# Patient Record
Sex: FEMALE | Race: BLACK OR AFRICAN AMERICAN | Employment: OTHER | URBAN - METROPOLITAN AREA
[De-identification: names, ages, dates, MRNs, and addresses within clinical notes are randomized per-mention and may not be internally consistent; named-entity substitution may affect disease eponyms.]

---

## 2017-04-11 ENCOUNTER — HOSPITAL ENCOUNTER (OUTPATIENT)
Dept: GENERAL RADIOLOGY | Age: 47
Discharge: HOME | End: 2017-04-11
Payer: MEDICAID

## 2017-04-11 ENCOUNTER — HOSPITAL ENCOUNTER (OUTPATIENT)
Dept: LAB | Age: 47
Discharge: HOME | End: 2017-04-11

## 2017-04-11 DIAGNOSIS — M13.0 UNSPECIFIED POLYARTHROPATHY OR POLYARTHRITIS, SITE UNSPECIFIED: ICD-10-CM

## 2017-04-11 DIAGNOSIS — M93.001: ICD-10-CM

## 2017-04-11 PROCEDURE — 99001 SPECIMEN HANDLING PT-LAB: CPT | Performed by: SPECIALIST

## 2017-04-11 PROCEDURE — 73562 X-RAY EXAM OF KNEE 3: CPT

## 2017-05-02 ENCOUNTER — APPOINTMENT (OUTPATIENT)
Dept: CT IMAGING | Age: 47
DRG: 720 | End: 2017-05-02
Attending: EMERGENCY MEDICINE
Payer: MEDICAID

## 2017-05-02 ENCOUNTER — HOSPITAL ENCOUNTER (INPATIENT)
Age: 47
LOS: 7 days | Discharge: HOME HEALTH | DRG: 720 | End: 2017-05-09
Attending: EMERGENCY MEDICINE | Admitting: FAMILY MEDICINE
Payer: MEDICAID

## 2017-05-02 ENCOUNTER — APPOINTMENT (OUTPATIENT)
Dept: GENERAL RADIOLOGY | Age: 47
DRG: 720 | End: 2017-05-02
Attending: EMERGENCY MEDICINE
Payer: MEDICAID

## 2017-05-02 DIAGNOSIS — G93.40 ACUTE ENCEPHALOPATHY: ICD-10-CM

## 2017-05-02 DIAGNOSIS — N39.0 URINARY TRACT INFECTION WITHOUT HEMATURIA, SITE UNSPECIFIED: ICD-10-CM

## 2017-05-02 DIAGNOSIS — N18.6 ESRD ON HEMODIALYSIS (HCC): ICD-10-CM

## 2017-05-02 DIAGNOSIS — Z99.2 ESRD ON HEMODIALYSIS (HCC): ICD-10-CM

## 2017-05-02 DIAGNOSIS — R73.9 HYPERGLYCEMIA: ICD-10-CM

## 2017-05-02 DIAGNOSIS — A41.9 SEPSIS, DUE TO UNSPECIFIED ORGANISM: Primary | ICD-10-CM

## 2017-05-02 PROBLEM — R50.9 ACUTE FEBRILE ILLNESS: Status: ACTIVE | Noted: 2017-05-02

## 2017-05-02 PROBLEM — M32.9 LUPUS (SYSTEMIC LUPUS ERYTHEMATOSUS) (HCC): Status: ACTIVE | Noted: 2017-05-02

## 2017-05-02 LAB
ADMINISTERED INITIALS, ADMINIT: NORMAL
ALBUMIN SERPL BCP-MCNC: 3.2 G/DL (ref 3.4–5)
ALBUMIN/GLOB SERPL: 0.7 {RATIO} (ref 0.8–1.7)
ALP SERPL-CCNC: 151 U/L (ref 45–117)
ALT SERPL-CCNC: 20 U/L (ref 13–56)
ANION GAP BLD CALC-SCNC: 18 MMOL/L (ref 3–18)
APPEARANCE UR: ABNORMAL
APTT PPP: 30.5 SEC (ref 23–36.4)
ARTERIAL PATENCY WRIST A: YES
AST SERPL W P-5'-P-CCNC: 6 U/L (ref 15–37)
ATRIAL RATE: 116 BPM
BACTERIA URNS QL MICRO: ABNORMAL /HPF
BASE DEFICIT BLD-SCNC: 6 MMOL/L
BASOPHILS # BLD AUTO: 0 K/UL (ref 0–0.1)
BASOPHILS # BLD: 0 % (ref 0–2)
BDY SITE: ABNORMAL
BILIRUB SERPL-MCNC: 0.6 MG/DL (ref 0.2–1)
BILIRUB UR QL: NEGATIVE
BUN SERPL-MCNC: 97 MG/DL (ref 7–18)
BUN/CREAT SERPL: 6 (ref 12–20)
CALCIUM SERPL-MCNC: 9.2 MG/DL (ref 8.5–10.1)
CALCULATED P AXIS, ECG09: 48 DEGREES
CALCULATED R AXIS, ECG10: -24 DEGREES
CALCULATED T AXIS, ECG11: 115 DEGREES
CHLORIDE SERPL-SCNC: 92 MMOL/L (ref 100–108)
CK MB CFR SERPL CALC: ABNORMAL % (ref 0–4)
CK MB SERPL-MCNC: <1 NG/ML (ref 5–25)
CK SERPL-CCNC: 20 U/L (ref 26–192)
CO2 SERPL-SCNC: 20 MMOL/L (ref 21–32)
COLOR UR: YELLOW
CREAT SERPL-MCNC: 15.3 MG/DL (ref 0.6–1.3)
D50 ADMINISTERED, D50ADM: 0 ML
D50 ORDER, D50ORD: 0 ML
DIAGNOSIS, 93000: NORMAL
DIFFERENTIAL METHOD BLD: ABNORMAL
EOSINOPHIL # BLD: 0.2 K/UL (ref 0–0.4)
EOSINOPHIL NFR BLD: 1 % (ref 0–5)
EPITH CASTS URNS QL MICRO: ABNORMAL /LPF (ref 0–5)
ERYTHROCYTE [DISTWIDTH] IN BLOOD BY AUTOMATED COUNT: 13.1 % (ref 11.6–14.5)
EST. AVERAGE GLUCOSE BLD GHB EST-MCNC: 318 MG/DL
FLUAV AG NPH QL IA: NEGATIVE
FLUBV AG NOSE QL IA: NEGATIVE
GAS FLOW.O2 O2 DELIVERY SYS: ABNORMAL L/MIN
GAS FLOW.O2 SETTING OXYMISER: 3 L/M
GLOBULIN SER CALC-MCNC: 4.4 G/DL (ref 2–4)
GLUCOSE BLD STRIP.AUTO-MCNC: 139 MG/DL (ref 70–110)
GLUCOSE BLD STRIP.AUTO-MCNC: 140 MG/DL (ref 70–110)
GLUCOSE BLD STRIP.AUTO-MCNC: 203 MG/DL (ref 70–110)
GLUCOSE BLD STRIP.AUTO-MCNC: 205 MG/DL (ref 70–110)
GLUCOSE BLD STRIP.AUTO-MCNC: 269 MG/DL (ref 70–110)
GLUCOSE BLD STRIP.AUTO-MCNC: >600 MG/DL (ref 70–110)
GLUCOSE SERPL-MCNC: 627 MG/DL (ref 74–99)
GLUCOSE UR STRIP.AUTO-MCNC: >1000 MG/DL
GLUCOSE, GLC: 139 MG/DL
GLUCOSE, GLC: 140 MG/DL
GLUCOSE, GLC: 203 MG/DL
GLUCOSE, GLC: 205 MG/DL
GLUCOSE, GLC: 269 MG/DL
GLUCOSE, GLC: 627 MG/DL
HBA1C MFR BLD: 12.7 % (ref 4.2–5.6)
HBV SURFACE AB SER QL IA: POSITIVE
HBV SURFACE AB SERPL IA-ACNC: 54.63 MIU/ML
HBV SURFACE AG SER QL: <0.1 INDEX
HBV SURFACE AG SER QL: NEGATIVE
HCG SERPL QL: NEGATIVE
HCO3 BLD-SCNC: 19 MMOL/L (ref 22–26)
HCT VFR BLD AUTO: 28.2 % (ref 35–45)
HEP BS AB COMMENT,HBSAC: NORMAL
HGB BLD-MCNC: 9.4 G/DL (ref 12–16)
HGB UR QL STRIP: ABNORMAL
HIGH TARGET, HITG: 180 MG/DL
INR PPP: 1.1 (ref 0.8–1.2)
INSULIN ADMINSTERED, INSADM: 0 UNITS/HOUR
INSULIN ADMINSTERED, INSADM: 1.5 UNITS/HOUR
INSULIN ADMINSTERED, INSADM: 1.6 UNITS/HOUR
INSULIN ADMINSTERED, INSADM: 11.3 UNITS/HOUR
INSULIN ADMINSTERED, INSADM: 2.1 UNITS/HOUR
INSULIN ADMINSTERED, INSADM: 2.9 UNITS/HOUR
INSULIN ORDER, INSORD: 0 UNITS/HOUR
INSULIN ORDER, INSORD: 1.5 UNITS/HOUR
INSULIN ORDER, INSORD: 1.6 UNITS/HOUR
INSULIN ORDER, INSORD: 11.3 UNITS/HOUR
INSULIN ORDER, INSORD: 2.1 UNITS/HOUR
INSULIN ORDER, INSORD: 2.9 UNITS/HOUR
KETONES UR QL STRIP.AUTO: NEGATIVE MG/DL
LACTATE BLD-SCNC: 1.4 MMOL/L (ref 0.4–2)
LEUKOCYTE ESTERASE UR QL STRIP.AUTO: ABNORMAL
LOW TARGET, LOT: 140 MG/DL
LYMPHOCYTES # BLD AUTO: 3 % (ref 21–52)
LYMPHOCYTES # BLD: 0.3 K/UL (ref 0.9–3.6)
MAGNESIUM SERPL-MCNC: 2 MG/DL (ref 1.6–2.6)
MCH RBC QN AUTO: 30.7 PG (ref 24–34)
MCHC RBC AUTO-ENTMCNC: 33.3 G/DL (ref 31–37)
MCV RBC AUTO: 92.2 FL (ref 74–97)
MINUTES UNTIL NEXT BG, NBG: 60 MIN
MONOCYTES # BLD: 0.2 K/UL (ref 0.05–1.2)
MONOCYTES NFR BLD AUTO: 2 % (ref 3–10)
MULTIPLIER, MUL: 0
MULTIPLIER, MUL: 0.01
MULTIPLIER, MUL: 0.01
MULTIPLIER, MUL: 0.02
NEUTS SEG # BLD: 11.9 K/UL (ref 1.8–8)
NEUTS SEG NFR BLD AUTO: 94 % (ref 40–73)
NITRITE UR QL STRIP.AUTO: NEGATIVE
ORDER INITIALS, ORDINIT: NORMAL
P-R INTERVAL, ECG05: 128 MS
PCO2 BLD: 34.8 MMHG (ref 35–45)
PH BLD: 7.35 [PH] (ref 7.35–7.45)
PH UR STRIP: 6 [PH] (ref 5–8)
PLATELET # BLD AUTO: 105 K/UL (ref 135–420)
PMV BLD AUTO: 11.4 FL (ref 9.2–11.8)
PO2 BLD: 95 MMHG (ref 80–100)
POTASSIUM SERPL-SCNC: 4.2 MMOL/L (ref 3.5–5.5)
PROT SERPL-MCNC: 7.6 G/DL (ref 6.4–8.2)
PROT UR STRIP-MCNC: 100 MG/DL
PROTHROMBIN TIME: 13.6 SEC (ref 11.5–15.2)
Q-T INTERVAL, ECG07: 290 MS
QRS DURATION, ECG06: 84 MS
QTC CALCULATION (BEZET), ECG08: 403 MS
RBC # BLD AUTO: 3.06 M/UL (ref 4.2–5.3)
RBC #/AREA URNS HPF: ABNORMAL /HPF (ref 0–5)
SAO2 % BLD: 97 % (ref 92–97)
SERVICE CMNT-IMP: ABNORMAL
SODIUM SERPL-SCNC: 130 MMOL/L (ref 136–145)
SP GR UR REFRACTOMETRY: 1.01 (ref 1–1.03)
SPECIMEN TYPE: ABNORMAL
TOTAL RESP. RATE, ITRR: 38
TROPONIN I SERPL-MCNC: <0.02 NG/ML (ref 0–0.04)
TSH SERPL DL<=0.05 MIU/L-ACNC: 0.68 UIU/ML (ref 0.36–3.74)
UROBILINOGEN UR QL STRIP.AUTO: 0.2 EU/DL (ref 0.2–1)
VENTRICULAR RATE, ECG03: 116 BPM
WBC # BLD AUTO: 12.6 K/UL (ref 4.6–13.2)
WBC URNS QL MICRO: ABNORMAL /HPF (ref 0–4)
YEAST URNS QL MICRO: ABNORMAL

## 2017-05-02 PROCEDURE — 85025 COMPLETE CBC W/AUTO DIFF WBC: CPT | Performed by: EMERGENCY MEDICINE

## 2017-05-02 PROCEDURE — 93005 ELECTROCARDIOGRAM TRACING: CPT

## 2017-05-02 PROCEDURE — 74011250636 HC RX REV CODE- 250/636: Performed by: INTERNAL MEDICINE

## 2017-05-02 PROCEDURE — 81001 URINALYSIS AUTO W/SCOPE: CPT | Performed by: EMERGENCY MEDICINE

## 2017-05-02 PROCEDURE — 74011000258 HC RX REV CODE- 258: Performed by: EMERGENCY MEDICINE

## 2017-05-02 PROCEDURE — 93990 DOPPLER FLOW TESTING: CPT

## 2017-05-02 PROCEDURE — 82550 ASSAY OF CK (CPK): CPT | Performed by: EMERGENCY MEDICINE

## 2017-05-02 PROCEDURE — 90935 HEMODIALYSIS ONE EVALUATION: CPT

## 2017-05-02 PROCEDURE — 74011250637 HC RX REV CODE- 250/637: Performed by: INTERNAL MEDICINE

## 2017-05-02 PROCEDURE — 5A1D60Z PERFORMANCE OF URINARY FILTRATION, MULTIPLE: ICD-10-PCS | Performed by: FAMILY MEDICINE

## 2017-05-02 PROCEDURE — 87186 SC STD MICRODIL/AGAR DIL: CPT | Performed by: EMERGENCY MEDICINE

## 2017-05-02 PROCEDURE — 83036 HEMOGLOBIN GLYCOSYLATED A1C: CPT | Performed by: EMERGENCY MEDICINE

## 2017-05-02 PROCEDURE — 83605 ASSAY OF LACTIC ACID: CPT

## 2017-05-02 PROCEDURE — 99285 EMERGENCY DEPT VISIT HI MDM: CPT

## 2017-05-02 PROCEDURE — 96365 THER/PROPH/DIAG IV INF INIT: CPT

## 2017-05-02 PROCEDURE — 83735 ASSAY OF MAGNESIUM: CPT | Performed by: EMERGENCY MEDICINE

## 2017-05-02 PROCEDURE — 96375 TX/PRO/DX INJ NEW DRUG ADDON: CPT

## 2017-05-02 PROCEDURE — 71010 XR CHEST PORT: CPT

## 2017-05-02 PROCEDURE — 85730 THROMBOPLASTIN TIME PARTIAL: CPT | Performed by: EMERGENCY MEDICINE

## 2017-05-02 PROCEDURE — 82962 GLUCOSE BLOOD TEST: CPT

## 2017-05-02 PROCEDURE — 85610 PROTHROMBIN TIME: CPT | Performed by: EMERGENCY MEDICINE

## 2017-05-02 PROCEDURE — 70450 CT HEAD/BRAIN W/O DYE: CPT

## 2017-05-02 PROCEDURE — 86038 ANTINUCLEAR ANTIBODIES: CPT | Performed by: INTERNAL MEDICINE

## 2017-05-02 PROCEDURE — 87040 BLOOD CULTURE FOR BACTERIA: CPT | Performed by: EMERGENCY MEDICINE

## 2017-05-02 PROCEDURE — 87086 URINE CULTURE/COLONY COUNT: CPT | Performed by: EMERGENCY MEDICINE

## 2017-05-02 PROCEDURE — 84703 CHORIONIC GONADOTROPIN ASSAY: CPT | Performed by: EMERGENCY MEDICINE

## 2017-05-02 PROCEDURE — 87077 CULTURE AEROBIC IDENTIFY: CPT | Performed by: EMERGENCY MEDICINE

## 2017-05-02 PROCEDURE — 80053 COMPREHEN METABOLIC PANEL: CPT | Performed by: EMERGENCY MEDICINE

## 2017-05-02 PROCEDURE — 87340 HEPATITIS B SURFACE AG IA: CPT | Performed by: INTERNAL MEDICINE

## 2017-05-02 PROCEDURE — 74011250636 HC RX REV CODE- 250/636: Performed by: EMERGENCY MEDICINE

## 2017-05-02 PROCEDURE — 82803 BLOOD GASES ANY COMBINATION: CPT

## 2017-05-02 PROCEDURE — 65660000004 HC RM CVT STEPDOWN

## 2017-05-02 PROCEDURE — 36600 WITHDRAWAL OF ARTERIAL BLOOD: CPT

## 2017-05-02 PROCEDURE — 86225 DNA ANTIBODY NATIVE: CPT | Performed by: INTERNAL MEDICINE

## 2017-05-02 PROCEDURE — 86706 HEP B SURFACE ANTIBODY: CPT | Performed by: INTERNAL MEDICINE

## 2017-05-02 PROCEDURE — 74011636637 HC RX REV CODE- 636/637: Performed by: EMERGENCY MEDICINE

## 2017-05-02 PROCEDURE — 86160 COMPLEMENT ANTIGEN: CPT | Performed by: INTERNAL MEDICINE

## 2017-05-02 PROCEDURE — 84443 ASSAY THYROID STIM HORMONE: CPT | Performed by: EMERGENCY MEDICINE

## 2017-05-02 PROCEDURE — 87804 INFLUENZA ASSAY W/OPTIC: CPT | Performed by: EMERGENCY MEDICINE

## 2017-05-02 RX ORDER — BISACODYL 5 MG
10 TABLET, DELAYED RELEASE (ENTERIC COATED) ORAL DAILY PRN
Status: DISCONTINUED | OUTPATIENT
Start: 2017-05-02 | End: 2017-05-09 | Stop reason: HOSPADM

## 2017-05-02 RX ORDER — MAGNESIUM SULFATE 100 %
4 CRYSTALS MISCELLANEOUS AS NEEDED
Status: DISCONTINUED | OUTPATIENT
Start: 2017-05-02 | End: 2017-05-09 | Stop reason: HOSPADM

## 2017-05-02 RX ORDER — DOXERCALCIFEROL 4 UG/2ML
1 INJECTION INTRAVENOUS
Status: DISCONTINUED | OUTPATIENT
Start: 2017-05-02 | End: 2017-05-09 | Stop reason: HOSPADM

## 2017-05-02 RX ORDER — SEVELAMER CARBONATE 800 MG/1
1600 TABLET, FILM COATED ORAL
Status: DISCONTINUED | OUTPATIENT
Start: 2017-05-02 | End: 2017-05-09 | Stop reason: HOSPADM

## 2017-05-02 RX ORDER — SODIUM CHLORIDE 0.9 % (FLUSH) 0.9 %
5-10 SYRINGE (ML) INJECTION AS NEEDED
Status: DISCONTINUED | OUTPATIENT
Start: 2017-05-02 | End: 2017-05-09 | Stop reason: HOSPADM

## 2017-05-02 RX ORDER — CINACALCET 30 MG/1
30 TABLET, FILM COATED ORAL
Status: DISCONTINUED | OUTPATIENT
Start: 2017-05-03 | End: 2017-05-09 | Stop reason: HOSPADM

## 2017-05-02 RX ORDER — DEXTROSE 50 % IN WATER (D50W) INTRAVENOUS SYRINGE
25-50 AS NEEDED
Status: DISCONTINUED | OUTPATIENT
Start: 2017-05-02 | End: 2017-05-09 | Stop reason: HOSPADM

## 2017-05-02 RX ORDER — ACETAMINOPHEN 325 MG/1
650 TABLET ORAL
Status: DISCONTINUED | OUTPATIENT
Start: 2017-05-02 | End: 2017-05-09 | Stop reason: HOSPADM

## 2017-05-02 RX ORDER — DIPHENHYDRAMINE HCL 25 MG
25 CAPSULE ORAL
Status: DISCONTINUED | OUTPATIENT
Start: 2017-05-02 | End: 2017-05-09 | Stop reason: HOSPADM

## 2017-05-02 RX ORDER — SIMVASTATIN 10 MG/1
10 TABLET, FILM COATED ORAL DAILY
Status: DISCONTINUED | OUTPATIENT
Start: 2017-05-03 | End: 2017-05-03 | Stop reason: SDUPTHER

## 2017-05-02 RX ORDER — HEPARIN SODIUM 5000 [USP'U]/ML
5000 INJECTION, SOLUTION INTRAVENOUS; SUBCUTANEOUS EVERY 12 HOURS
Status: DISCONTINUED | OUTPATIENT
Start: 2017-05-02 | End: 2017-05-09 | Stop reason: HOSPADM

## 2017-05-02 RX ORDER — MIDODRINE HYDROCHLORIDE 2.5 MG/1
5 TABLET ORAL 2 TIMES DAILY WITH MEALS
Status: DISCONTINUED | OUTPATIENT
Start: 2017-05-02 | End: 2017-05-03

## 2017-05-02 RX ORDER — ONDANSETRON 2 MG/ML
4 INJECTION INTRAMUSCULAR; INTRAVENOUS
Status: DISCONTINUED | OUTPATIENT
Start: 2017-05-02 | End: 2017-05-09 | Stop reason: HOSPADM

## 2017-05-02 RX ADMIN — SODIUM CHLORIDE 11.3 UNITS/HR: 900 INJECTION, SOLUTION INTRAVENOUS at 14:27

## 2017-05-02 RX ADMIN — PIPERACILLIN AND TAZOBACTAM 3.38 G: 3; .375 INJECTION, POWDER, LYOPHILIZED, FOR SOLUTION INTRAVENOUS; PARENTERAL at 21:45

## 2017-05-02 RX ADMIN — SODIUM CHLORIDE 1000 MG: 900 INJECTION, SOLUTION INTRAVENOUS at 14:31

## 2017-05-02 RX ADMIN — DOXERCALCIFEROL 1 MCG: 4 INJECTION, SOLUTION INTRAVENOUS at 17:30

## 2017-05-02 RX ADMIN — ERYTHROPOIETIN 5000 UNITS: 3000 INJECTION, SOLUTION INTRAVENOUS; SUBCUTANEOUS at 14:30

## 2017-05-02 RX ADMIN — SEVELAMER CARBONATE 1600 MG: 800 TABLET, FILM COATED ORAL at 21:41

## 2017-05-02 RX ADMIN — PIPERACILLIN SODIUM AND TAZOBACTAM SODIUM 4.5 G: 4; .5 INJECTION, POWDER, LYOPHILIZED, FOR SOLUTION INTRAVENOUS at 12:09

## 2017-05-02 RX ADMIN — SODIUM CHLORIDE 500 ML: 900 INJECTION, SOLUTION INTRAVENOUS at 12:09

## 2017-05-02 RX ADMIN — MIDODRINE HYDROCHLORIDE 5 MG: 2.5 TABLET ORAL at 21:41

## 2017-05-02 NOTE — ED TRIAGE NOTES
Pt missed HD recently. Arriving via EMS who reports patient has fever and is tachycardic. Pt has confusion.

## 2017-05-02 NOTE — IP AVS SNAPSHOT
Miesha  
 
 
 [address] Patient: Dawit Stahl MRN: [id number] [facility]:7/00/[birth year]98 You are allergic to the following Allergen Reactions Sulfa (Sulfonamide Antibiotics) Hives Recent Documentation Height Weight Breastfeeding? BMI OB Status Smoking Status 1.753 m 66 kg No 21.49 kg/m2 Postmenopausal Never Smoker Unresulted Labs Order Current Status CULTURE, BLOOD Preliminary result CULTURE, BLOOD Preliminary result CULTURE, BLOOD Preliminary result Emergency Contacts Name Discharge Info Relation Home Work Mobile Phoebe Vinson DISCHARGE CAREGIVER [3] Parent [1] [phone] About your hospitalization You were admitted on:  May 2, 2017 You last received care in the:  [address] [place] [address] You were discharged on:  May 9, 2017 Unit phone number:  [phone] Why you were hospitalized Your primary diagnosis was:  Acute Diastolic Chf (Congestive Heart Failure) (Hcc) Your diagnoses also included:  Uti (Urinary Tract Infection), Hyperglycemia, Sepsis (Hcc), Acute Febrile Illness, Esrd On Hemodialysis (Hcc), Lupus (Systemic Lupus Erythematosus) (Hcc), Dm Type 2 (Diabetes Mellitus, Type 2) ([facility], located within [facility]) Providers Seen During Your Hospitalizations Provider Role Specialty Primary office phone Ashtyn Nguyen MD Attending Provider Emergency Medicine [phone] Les Schaefer MD Attending Provider [facility] [phone] Naa Downing MD Attending Provider [facility] [phone] Your Primary Care Physician (PCP) Primary Care Physician Office Phone Office Fax Grant Christina [phone] Follow-up Information Follow up With Details Comments Contact Info Viky Shields MD On 5/15/2017 @0145pm [address] [place] [address] 
[phone] [facility] On 5/11/2017 Continue dialysis with chair time 10:30 AM neshary Tuesday, Thursday & Saturday   Teri Mccormick [id number] 
[phone] Your Appointments Wednesday May 31, 2017 12:30 PM EDT  
TARA 3D [facility] MAMMO SCREENING with [facility] RM  
[address] [address] [address] [address] [phone] PAYMENT  For Non-Medicare patients - $15.00 will be collected from you at the time of your exam.  You will be billed $35.00 from the reading [facility]. OUTSIDE FILMS  - Any outside films related to the study being scheduled should be brought with you on the day of the exam.  If this cannot be done there may be a delay in the reading of the study. MEDICATIONS  - Patient must bring a complete list of all medications currently taking to include prescriptions, over-the-counter meds, herbals, vitamins & any dietary supplements  GENERAL INSTRUCTIONS  - On the day of your exam do not use any bath powder, deodorant or lotions on the armpit area. -Tenderness of breasts may cause an increase of discomfort during procedure. If you are experiencing breast tenderness on the day of your appointment and would like to reschedule, please call [phone]. CHECK IN INSTRUCTIONS  Check in to Registration desk 15 minutes prior to your appointment. [address] [facility] [address] (located on the second floor) [place], [address] Current Discharge Medication List  
  
START taking these medications Dose & Instructions Dispensing Information Comments Morning Noon Evening Bedtime  
 acetaminophen 325 mg tablet Commonly known as:  TYLENOL Your last dose was: Your next dose is:    
   
   
 Dose:  650 mg Take 2 Tabs by mouth every four (4) hours as needed. Quantity:  100 Tab Refills:  0  
     
   
   
   
  
 aspirin delayed-release 81 mg tablet Your last dose was: Your next dose is:    
   
   
 Dose:  81 mg Take 1 Tab by mouth daily. Quantity:  100 Tab Refills:  0  
     
   
   
   
  
 b complex-vitamin c-folic acid 1 mg capsule Commonly known as:  Flako Carlin Your last dose was: Your next dose is:    
   
   
 Dose:  1 Cap Take 1 Cap by mouth daily. Quantity:  100 Cap Refills:  0  
     
   
   
   
  
 bisacodyl 5 mg EC tablet Commonly known as:  DULCOLAX Your last dose was: Your next dose is:    
   
   
 Dose:  10 mg Take 2 Tabs by mouth daily as needed for Constipation. Quantity:  100 Tab Refills:  0  
     
   
   
   
  
 cinacalcet 30 mg tablet Commonly known as:  SENSIPAR Your last dose was: Your next dose is:    
   
   
 Dose:  30 mg Take 1 Tab by mouth daily (with breakfast) for 90 days. Quantity:  100 Tab Refills:  0  
     
   
   
   
  
 diphenhydrAMINE 25 mg capsule Commonly known as:  BENADRYL Your last dose was: Your next dose is:    
   
   
 Dose:  25 mg Take 1 Cap by mouth every four (4) hours as needed for Itching for up to 10 days. Quantity:  60 Cap Refills:  0  
     
   
   
   
  
 insulin glargine 100 unit/mL injection Commonly known as:  LANTUS Your last dose was: Your next dose is:    
   
   
 20 units hs Quantity:  1 Vial  
Refills:  0  
     
   
   
   
  
 insulin lispro 100 unit/mL injection Commonly known as:  HUMALOG Your last dose was: Your next dose is:    
   
   
 Less than 150- 0 [phone] 350and above-15 and call MD  
 Quantity:  1 Vial  
Refills:  0 Lactobacillus Acidoph & Bulgar 1 million cell Tab tablet Commonly known as:  Court Flaming Your last dose was: Your next dose is:    
   
   
 Dose:  2 Tab Take 2 Tabs by mouth two (2) times a day. Quantity:  60 Tab Refills:  0  
     
   
   
   
  
 pravastatin 20 mg tablet Commonly known as:  PRAVACHOL Your last dose was: Your next dose is:    
   
   
 Dose:  20 mg Take 1 Tab by mouth nightly. Quantity:  100 Tab Refills:  0  
     
   
   
   
  
 sevelamer carbonate 800 mg Tab tab Commonly known as:  Aravind Burns Your last dose was: Your next dose is:    
   
   
 Dose:  1600 mg Take 2 Tabs by mouth three (3) times daily (with meals). Quantity:  100 Tab Refills:  0 CONTINUE these medications which have NOT CHANGED Dose & Instructions Dispensing Information Comments Morning Noon Evening Bedtime  
 predniSONE 5 mg tablet Commonly known as:  Kelli López Your last dose was: Your next dose is:    
   
   
 Dose:  5 mg Take 5 mg by mouth. Refills:  0 Where to Get Your Medications Information on where to get these meds will be given to you by the nurse or doctor. ! Ask your nurse or doctor about these medications  
  acetaminophen 325 mg tablet  
 aspirin delayed-release 81 mg tablet  
 b complex-vitamin c-folic acid 1 mg capsule  
 bisacodyl 5 mg EC tablet  
 cinacalcet 30 mg tablet  
 diphenhydrAMINE 25 mg capsule  
 insulin glargine 100 unit/mL injection  
 insulin lispro 100 unit/mL injection Lactobacillus Acidoph & Bulgar 1 million cell Tab tablet  
 pravastatin 20 mg tablet  
 sevelamer carbonate 800 mg Tab tab Discharge Instructions Learning About Saving Energy When You Have a Chronic Condition Introduction Everyday tasks can be tiring when you have COPD, heart failure, or another long-term (chronic) condition. You may feel at times that you've lost your ability to live your life. But learning to conserve, or save, your energy can help you be less tired. Conserving your energy means finding ways of doing daily activities with as little effort as possible. With some small changes in the way you do things, you can get your tasks done more easily. Some treatments are available that might help. Pulmonary rehabilitation can teach you ways to breathe easier. Cardiac rehabilitation can help make your heart stronger. You also may want to see an occupational or physical therapist. The therapist can give you more tips on building strength and moving with less effort. What can you do to conserve your energy? Planning · Make a list of what you have to do every day. Group the tasks by location. · Do all the chores in one part of your house around the same time. · Go out for errands or do chores at the time of day when you have the most energy. · Plan rest periods into your day. Getting things done · Sit down as often as you can when you get dressed, do chores, or cook. · Use a cart with wheels to roll items, such as laundry, from one room to another. · Push or slide boxes or other large items instead of lifting them. Reaching and bending · Put things you use the most on shelves that are at the level of your waist or shoulder. · Use long-handled grabbers or other tools to reach items on a high shelf or to  things off the floor. Use long-handled dusters when you clean the house. · Use a raised toilet seat to avoid bending too far to sit or stand up. Eating · Eat several small meals instead of three larger meals. · If you get too tired to eat much, try to choose healthy foods that have more calories. Have a yogurt-and-fruit smoothie for breakfast. Put avocado on a sandwich. Or add cheese or peanut butter to snacks. · If you don't feel very hungry, try to eat first and drink water or other fluids later, after a meal. This can help keep you from losing weight. Sip small amounts of fluids if you need to drink while you eat. Having sex · Choose the time of day when you have more energy. · A icfl-te-ywxh position for sex can be less tiring. Sometimes you may want to focus more on caressing. Watch closely for changes in your health, and be sure to contact your doctor if you have any problems. Where can you learn more? Go to http://tessa-marcelle.info/. Enter H190 in the search box to learn more about \"Learning About Saving Energy When You Have a Chronic Condition. \" Current as of: May 23, 2016 Content Version: 11.2 © 6107-8914 [employer]Vengo. Care instructions adapted under license by [employer] (which disclaims liability or warranty for this information). If you have questions about a medical condition or this instruction, always ask your healthcare professional. [address] any warranty or liability for your use of this information. Learning About Saving Energy When You Have a Chronic Condition Introduction Everyday tasks can be tiring when you have COPD, heart failure, or another long-term (chronic) condition. You may feel at times that you've lost your ability to live your life. But learning to conserve, or save, your energy can help you be less tired. Conserving your energy means finding ways of doing daily activities with as little effort as possible. With some small changes in the way you do things, you can get your tasks done more easily. Some treatments are available that might help. Pulmonary rehabilitation can teach you ways to breathe easier. Cardiac rehabilitation can help make your heart stronger. You also may want to see an occupational or physical therapist. The therapist can give you more tips on building strength and moving with less effort. What can you do to conserve your energy? Planning · Make a list of what you have to do every day. Group the tasks by location. · Do all the chores in one part of your house around the same time. · Go out for errands or do chores at the time of day when you have the most energy. · Plan rest periods into your day. Getting things done · Sit down as often as you can when you get dressed, do chores, or cook. · Use a cart with wheels to roll items, such as laundry, from one room to another. · Push or slide boxes or other large items instead of lifting them. Reaching and bending · Put things you use the most on shelves that are at the level of your waist or shoulder. · Use long-handled grabbers or other tools to reach items on a high shelf or to  things off the floor. Use long-handled dusters when you clean the house. · Use a raised toilet seat to avoid bending too far to sit or stand up. Eating · Eat several small meals instead of three larger meals. · If you get too tired to eat much, try to choose healthy foods that have more calories. Have a yogurt-and-fruit smoothie for breakfast. Put avocado on a sandwich. Or add cheese or peanut butter to snacks. · If you don't feel very hungry, try to eat first and drink water or other fluids later, after a meal. This can help keep you from losing weight. Sip small amounts of fluids if you need to drink while you eat. Having sex · Choose the time of day when you have more energy. · A hser-yl-pqqh position for sex can be less tiring. Sometimes you may want to focus more on caressing. Watch closely for changes in your health, and be sure to contact your doctor if you have any problems. Where can you learn more? Go to http://tessa-marcelle.info/. Enter H190 in the search box to learn more about \"Learning About Saving Energy When You Have a Chronic Condition. \" Current as of: May 23, 2016 Content Version: 11.2 © 9260-0367 [employer]aptic[employer], IGIGI. Care instructions adapted under license by [employer] (which disclaims liability or warranty for this information).  If you have questions about a medical condition or this instruction, always ask your healthcare professional. [place] [address] any warranty or liability for your use of this information. DISCHARGE SUMMARY from Nurse The following personal items are in your possession at time of discharge: 
 
Dental Appliances: None Visual Aid: None Home Medications: None Jewelry: None Clothing: At bedside, Pants, Socks, Undergarments Other Valuables: None PATIENT INSTRUCTIONS: 
 
 
F-face looks uneven A-arms unable to move or move unevenly S-speech slurred or non-existent T-time-call 911 as soon as signs and symptoms begin-DO NOT go Back to bed or wait to see if you get better-TIME IS BRAIN. Warning Signs of HEART ATTACK Call 911 if you have these symptoms: 
? Chest discomfort. Most heart attacks involve discomfort in the center of the chest that lasts more than a few minutes, or that goes away and comes back. It can feel like uncomfortable pressure, squeezing, fullness, or pain. ? Discomfort in other areas of the upper body. Symptoms can include pain or discomfort in one or both arms, the back, neck, jaw, or stomach. ? Shortness of breath with or without chest discomfort. ? Other signs may include breaking out in a cold sweat, nausea, or lightheadedness. Don't wait more than five minutes to call [address]! Fast action can save your life. Calling 911 is almost always the fastest way to get lifesaving treatment. Emergency Medical Services staff can begin treatment when they arrive  up to an hour sooner than if someone gets to the hospital by car. The discharge information has been reviewed with the patient. The patient verbalized understanding. Discharge medications reviewed with the patient and appropriate educational materials and side effects teaching were provided. Patient armband removed and shredded. [email or web address] Activation Thank you for requesting access to [employer]. Please follow the instructions below to securely access and download your online medical record. [employer] allows you to send messages to your doctor, view your test results, renew your prescriptions, schedule appointments, and more. How Do I Sign Up? 1. In your internet browser, go to [email or web address] 
2. Click on the First Time User? Click Here link in the Sign In box. You will be redirect to the New Member Sign Up page. 3. Enter your [employer] Access Code exactly as it appears below. You will not need to use this code after youve completed the sign-up process. If you do not sign up before the expiration date, you must request a new code. [employer] Access Code: Activation code not generated Current [employer] Status: Patient Declined (This is the date your [employer] access code will ) 4. Enter the last four digits of your Social Security Number (xxxx) and Date of Birth (mm/dd/yyyy) as indicated and click Submit. You will be taken to the next sign-up page. 5. Create a [employer] ID. This will be your [employer] login ID and cannot be changed, so think of one that is secure and easy to remember. 6. Create a [employer] password. You can change your password at any time. 7. Enter your Password Reset Question and Answer. This can be used at a later time if you forget your password. 8. Enter your e-mail address. You will receive e-mail notification when new information is available in [address] 9. Click Sign Up. You can now view and download portions of your medical record. 10. Click the Download Summary menu link to download a portable copy of your medical information. Additional Information If you have questions, please visit the Frequently Asked Questions section of the [employer] website at [email or web address] Remember, MyChart is NOT to be used for urgent needs. For medical emergencies, dial 911. Discharge Orders None MyChart Announcement We are excited to announce that we are making your provider's discharge notes available to you in [employer]. You will see these notes when they are completed and signed by the physician that discharged you from your recent hospital stay. If you have any questions or concerns about any information you see in [employer], please call the Health Information Department where you were seen or reach out to your Primary Care Provider for more information about your plan of care. General Information Please provide this summary of care documentation to your next provider. Patient Signature:  ____________________________________________________________ Date:  ____________________________________________________________  
  
Abdoul Amador Provider Signature:  ____________________________________________________________ Date:  ____________________________________________________________

## 2017-05-02 NOTE — ED NOTES
Bedside change of shift report received from [facility]. Patient greeted / introduced myself as their primary nurse. Encouraged to voice any concerns, and all questions/concerns addressed. Explanation and teaching of all care given, including any pending orders or procedures. Call bell with reach. Patient fall risk assessed, with prevention measures in place, to include bed in lowest position with casters locked and rail up, call bell within reach, frequent toileting in progress, lights on, pathway to bathroom free from obstacles. Patient instructed to call for assist OOB at all times. Instructed that staff will make hourly rounds to provide reassessments in pain control, concerns, toileting, and any other updates in care. Hand hygiene maintained prior to and after patient/staff interaction.

## 2017-05-02 NOTE — DIABETES MGMT
GLYCEMIC CONTROL PLAN OF CARE    Assessment/Recommendations:  Pt is a 48year old female with a past medical history significant for CAD, diabetes, ESRD on dialysis, hypertension, lupus, and stroke. Blood glucose elevated above 600 mg/dL upon admission and pt placed on glucostabilizer insulin drip protocol. Continue insulin drip at this time. Recommend addition of diabetic diet to current renal diet order. Will follow up for educational needs when more appropriate. Most recent blood glucose values: >600 mg/dL     Current A1C of 12.7% is equivalent to average blood glucose of 318 mg/dl over the past 2-3 months.     Current hospital diabetes medications:   GlucoStabilizer insulin drip     Home diabetes medications: none listed, will follow up     Diet:  Renal    Education:  ____Refer to Diabetes Education Record             __x__Education not indicated at this time, will follow up when pt appropriate      Anita Waldron RD, CDE

## 2017-05-02 NOTE — CONSULTS
[address]    Name:  Bruno Davis  MR#:  [id number]  :  1970  Account #:  [de-identified]  Date of Adm:  2017  Date of Consultation:  2017      REQUESTING PHYSICIAN: Dr. Estephania Rutledge. REASON FOR CONSULTATION: Dialysis patient was sent to the  emergency room. HISTORY OF PRESENT ILLNESS: This pleasant 52-year-old [facility]  American female known to me for her renal and medical problems sent  to the emergency room by me. She usually gets dialysis every  Tuesday, Thursday, Saturday under my care. As a result, she came for  dialysis treatment this morning and during that time, the dialysis nurse  found the patient having vomiting as well as diarrhea and temperature  of 101 with tachycardia. The patient was not on the machine and I was  asked what to do, given that information, I told them to send the patient  to the emergency room to be evaluated with a high fever and also  usually she runs a low blood pressure. Subsequently, the patient came  to the emergency room where she was further workup was initiated. She has long-term history of ESRD secondary to underlying type 2  diabetes mellitus and is on insulin of Lantus at home. Besides that, she  has history of hypertension, but most of the time she has low blood  pressure during dialysis, also has history of hyperlipidemia, secondary  hyperparathyroidism, hyperphosphatemia, and also peripheral  neuropathy. Moreover, she has history of systemic lupus  erythematosus. Recently she has seen the hematologist who  has started her on CellCept 500 mg p.o. twice daily and    nonsteroidal anti-inflammatory medication. They said that she is on  prednisone at low-dose for a long period of time. The patient still  makes some urine, but did not complain of any dysuria or urgency and  hesitancy, any flank pain.  She has a functional fistula on the left arm  which is working fine without any drainage and erythema and the  Doppler study was being done today. By this time, the Doppler  study did not show any perigraft collection, minimal stenosis of no  significance. Temperature in the emergency room was much higher,  was 102 and tachycardic. The patient was given little fluid, as well as  blood sugar was quite high, more than 500. For that reason we started  her on insulin drip. Subsequently, the pan cultures were done including  blood culture and urine culture report of which is still pending. PAST MEDICAL HISTORY: As I mentioned, history of hypertension,  type 2 diabetes mellitus, ESRD, hyperlipidemia, lupus as well as  coronary artery disease and old stroke. PAST SURGICAL HISTORY: AV fistula placement, also has history of  . SOCIAL HISTORY: . Never smoked. No drug or alcohol-  related problem. ALLERGIES:  1. SULFA. LIST OF MEDICATIONS: List of medications listed in medical history,  she did not bring here, but she takes  1. Prednisone 5 mg p.o. daily. 2. Renvela 800 mg 2 tablets 3 times daily. 3. Sensipar 30 mg p.o. daily. 4. Nephrocaps once a day. 5. Midodrine 5 mg p.o. daily when she low blood pressure. 6. Zocor 10 mg.  7. She takes Lantus, dose of Lantus is not clear to me. REVIEW OF SYSTEMS  CARDIOVASCULAR: No chest pain, but having palpitations and also  having some shortness of breath. No leg swelling. PULMONARY: Shortness of breath, without any wheezing or  coughing, but having fever. No calf muscle tenderness. URINARY: Urinary system, does not complain of anything, but the  urine that was examined looks has pyuria with leukocyte esterase  positive. No flank pain. GASTROINTESTINAL: Diarrhea following starting of CellCept and also  poor appetite and vomiting without any abdominal pain. FAMILY HISTORY: Not clear. SURGICAL HISTORY: Has history of AV fistula as well as  hysterectomy.     PHYSICAL EXAMINATION  VITAL SIGNS: Temperature on admission 102.9, blood pressure was  174/70, 184/88 and the heart rate of 118. Subsequently, blood  pressure came down to 112/69 and during dialysis dropped further. The  patient was  tachyp sergey,22 breaths per  minute, oxygen saturation of 96 to  100% at room air. HEENT: Oral mucosa dry. No candidiasis. NECK: Supple. No jugular venous distention. No bruit. LUNGS: Decreased breath sounds with some mild crackles at the  bases. HEART: Tachycardic. ABDOMEN: Soft. No palpable mass. EXTREMITIES: Ankles trace edema. Has a good thrill and bruit on his  left forearm AV graft with no drainage, no tenderness, no erythema. LABORATORY DATA: Relevant labs that have been done today, WBC  of 12.6 with hemoglobin of 9.4, hematocrit of 28.2, platelets of 687,846,  with neutrophils of 94%, lymphocytes 3%, monocytes 2%, eosinophils  1%. CHEMISTRY: Sodium 130, potassium 4.2, chloride 92, CO2 20, anion  gap of 18, glucose of 627,   calcium 9.2, magnesium 2.0. EGFR is around 7 ccper minute. Total  protein of 7.6 with albumin of 3.2. CPK is 20. MB fraction of less than  1. Troponin is 0.02. Hemoglobin A1c is 12.7 and    glucose of 318. Chest x-ray shows hyperinflation with likely exacerbating  bronchoalveolar markings at the lung base. Stable cardiomegaly,  Otherwise  unremarkable. Blood culture and urine culture in progress. Point of care on Insulin drip. The last blood sugar is 113 and before that was  267. IMPRESSION AND PLAN:  1. Sepsis. Source of infection high fever was not clear. Doppler study  of the fistula was done. No evidence of infection. Urine is suspicious  for urinary tract infection. Urine culture being sent and the patient  Has  Started   antibiotics. Blood cultures have also been sent. We need to follow the  blood culture. For her blood pressure maintenance we may need to  give midodrine otherwise is going to restart all her home medications. I  also hold her CellCept as it caused diarrhea. We will also check the  lupus serology as advised.  Further recommendations will be given  based on the hospital course.         Juliet Haskins MD    [address] / [facility]  D:  05/02/2017   17:49  T:  05/02/2017   18:34  Job #:  [id number]

## 2017-05-02 NOTE — PROGRESS NOTES
Admitted with High Fever,tachycardia, Diarrhea. Has ESRD, also lupus, recently her Rheumatologist  Has started with Cellcept,NSAID,on steroid for long time. Will dialyze her today, orders given. Will send Blood culture, Lupus serology, doppler study of  AVF, Hold Cellcept & still check stool for C. Diiff.

## 2017-05-02 NOTE — ED PROVIDER NOTES
HPI Comments: 10:47 AM Dawit Stahl is a 48 y.o. female with a h/o CAD, DM, ESRD, HTN, lupus and stroke, who presents to the ED for the evaluation of dyspnea. Pt c/o SOB while at dialysis today, but did not receive dialysis due to SOB and confusion. Pt reportedly missed her dialysis 3 days ago. Per EMS, Pt was 88% on RA at the time of arrival, then 97% on 2L O2. Blood glucose 593, /90, 's and temperature 101, per EMS. Hx limited due to mental status change      PCP: Liana Johansen MD         The history is provided by the EMS personnel. Past Medical History:   Diagnosis Date    CAD (coronary artery disease)     [facility] [address]     ESRD (end stage renal disease) on dialysis ([facility] [address]     MONDAY, WEDNESDAY, FRIDAY    Hypertension     Lupus ([facility] [address]     Stroke ([facility] [address]        Past Surgical History:   Procedure Laterality Date    HX  SECTION      HX VASCULAR ACCESS           No family history on file. Social History     Social History    Marital status:      Spouse name: N/A    Number of children: N/A    Years of education: N/A     Occupational History    Not on file. Social History Main Topics    Smoking status: Never Smoker    Smokeless tobacco: Never Used    Alcohol use No    Drug use: No    Sexual activity: Not on file     Other Topics Concern    Not on file     Social History Narrative         ALLERGIES: Sulfa (sulfonamide antibiotics)    Review of Systems   Unable to perform ROS: Mental status change       Vitals:    17 1215 17 1230 17 1245 17 1300   BP: 155/65 175/64 156/70 154/61   Pulse: (!) 107 (!) 105 (!) 102 99   Resp: (!) 34 (!) 32 (!) 34 (!) 31   Temp:       SpO2: 100% 100% 100% 100%            Physical Exam   Constitutional: She appears well-developed and well-nourished. No distress. HENT:   Head: Normocephalic and atraumatic. Mouth/Throat: Oropharynx is clear and moist. Mucous membranes are dry.    Eyes: Conjunctivae and EOM are normal. Pupils are equal, round, and reactive to light. No scleral icterus. Neck: Trachea normal and normal range of motion. Neck supple. No JVD present. No thyromegaly present. Full active and passive neck flexion without pain or grimace. Cardiovascular: Regular rhythm, S1 normal and S2 normal.  Tachycardia present. Exam reveals no gallop and no friction rub. No murmur heard. Pulmonary/Chest: Effort normal and breath sounds normal. No accessory muscle usage. Tachypnea noted. No respiratory distress. Abdominal: Soft. Normal appearance. She exhibits no distension. There is no tenderness. There is no rigidity, no rebound and no guarding. Musculoskeletal: Normal range of motion. She exhibits no edema (BLE trace pitting) or tenderness. Dialysis shunt RUE with palpable thrill   Neurological: She is alert. She has normal strength. No cranial nerve deficit or sensory deficit. Coordination normal.   oriented to person only. Follows commands, Equal  strength. No facial asymmetry. Seems confused. Skin: Skin is warm and intact. No rash noted. Vitals reviewed. MDM  Number of Diagnoses or Management Options  Acute encephalopathy:   ESRD on hemodialysis [facility]): Hyperglycemia:   Sepsis, due to unspecified organism [facility]):   Urinary tract infection without hematuria, site unspecified:   Diagnosis management comments: Vikki Fitzgerald is a 48 y.o. Female coming in with fever, AMS and SOB. Missed dialysis. CXR shows some mild pulmonary vascular congestion, but does not seem severely overloaded. Hx limited due to patient's MS change. No meningeal signs concerning for meningitis. CT head negative. UA looks dirty, but this is a cath specimen in a dialysis patient and often with significant pyuria. BGL >600, mildly acidotic, but no ketones in urine and pH 7.34. Doubt DKA, but will start insulin drip. Sepsis protocol and broad spectrum abx given.  No large fluid bolus dude to dialysis and fluid overload, but tolerated 500 cc bolus and improved HR from 120 to mid 90s. Will admit.     ED Course       Procedures    Medications ordered:   Medications   sodium chloride (NS) flush 5-10 mL (not administered)   doxercalciferol (HECTOROL) 4 mcg/2 mL injection 1 mcg (not administered)   vancomycin (VANCOCIN) 1,000 mg in 0.9% sodium chloride (MBP/ADV) 250 mL adv (1,000 mg IntraVENous New Bag 5/2/17 1431)   piperacillin-tazobactam (ZOSYN) 3.375 g in 0.9% sodium chloride (MBP/ADV) 100 mL MBP (not administered)   insulin regular (NOVOLIN R, HUMULIN R) 100 Units in 0.9% sodium chloride 100 mL infusion (11.3 Units/hr IntraVENous New Bag 5/2/17 1427)   glucose chewable tablet 16 g (not administered)   glucagon (GLUCAGEN) injection 1 mg (not administered)   dextrose (D50W) injection syrg 12.5-25 g (not administered)   VANCOMYCIN INFORMATION NOTE (not administered)   epoetin dwaine (EPOGEN;PROCRIT) 5,000 Units (5,000 Units IntraVENous Given 5/2/17 1430)   sodium chloride 0.9 % bolus infusion 500 mL (0 mL IntraVENous IV Completed 5/2/17 1300)   piperacillin-tazobactam (ZOSYN) 4.5 g in 0.9% sodium chloride (MBP/ADV) 100 mL MBP (0 g IntraVENous IV Completed 5/2/17 1239)         Lab findings:  Recent Results (from the past 12 hour(s))   EKG, 12 LEAD, INITIAL    Collection Time: 05/02/17 11:10 AM   Result Value Ref Range    Ventricular Rate 116 BPM    Atrial Rate 116 BPM    P-R Interval 128 ms    QRS Duration 84 ms    Q-T Interval 290 ms    QTC Calculation (Bezet) 403 ms    Calculated P Axis 48 degrees    Calculated R Axis -24 degrees    Calculated T Axis 115 degrees    Diagnosis       Sinus tachycardia  Nonspecific ST and T wave abnormality  Abnormal ECG  No previous ECGs available  Confirmed by Hortencia Murguia ([id number]) on 5/2/2017 1:48:49 PM     INFLUENZA A & B AG (RAPID TEST)    Collection Time: 05/02/17 11:11 AM   Result Value Ref Range    Influenza A Antigen NEGATIVE  NEG      Influenza B Antigen NEGATIVE  NEG     HEP B SURFACE AB Collection Time: 05/02/17 11:30 AM   Result Value Ref Range    Hepatitis B surface Ab 54.63 >10.0 mIU/mL    Hep B surface Ab Interp. POSITIVE POS      Hep B surface Ab comment        Samples with a  value of 10 mIU/mL or greater are considered positive (protective immunity) in accordance with the CDC guidelines. HEP B SURFACE AG    Collection Time: 05/02/17 11:30 AM   Result Value Ref Range    Hepatitis B surface Ag <0.10 <1.00 Index    Hep B surface Ag Interp. NEGATIVE  NEG     PROTHROMBIN TIME + INR    Collection Time: 05/02/17 11:30 AM   Result Value Ref Range    Prothrombin time 13.6 11.5 - 15.2 sec    INR 1.1 0.8 - 1.2     PTT    Collection Time: 05/02/17 11:30 AM   Result Value Ref Range    aPTT 30.5 23.0 - 36.4 SEC   CBC WITH AUTOMATED DIFF    Collection Time: 05/02/17 11:30 AM   Result Value Ref Range    WBC 12.6 4.6 - 13.2 K/uL    RBC 3.06 (L) 4.20 - 5.30 M/uL    HGB 9.4 (L) 12.0 - 16.0 g/dL    HCT 28.2 (L) 35.0 - 45.0 %    MCV 92.2 74.0 - 97.0 FL    MCH 30.7 24.0 - 34.0 PG    MCHC 33.3 31.0 - 37.0 g/dL    RDW 13.1 11.6 - 14.5 %    PLATELET 352 (L) 442 - 420 K/uL    MPV 11.4 9.2 - 11.8 FL    NEUTROPHILS 94 (H) 40 - 73 %    LYMPHOCYTES 3 (L) 21 - 52 %    MONOCYTES 2 (L) 3 - 10 %    EOSINOPHILS 1 0 - 5 %    BASOPHILS 0 0 - 2 %    ABS. NEUTROPHILS 11.9 (H) 1.8 - 8.0 K/UL    ABS. LYMPHOCYTES 0.3 (L) 0.9 - 3.6 K/UL    ABS. MONOCYTES 0.2 0.05 - 1.2 K/UL    ABS. EOSINOPHILS 0.2 0.0 - 0.4 K/UL    ABS.  BASOPHILS 0.0 0.0 - 0.1 K/UL    DF AUTOMATED     HCG QL SERUM    Collection Time: 05/02/17 11:30 AM   Result Value Ref Range    HCG, Ql. NEGATIVE  NEG     CARDIAC PANEL,(CK, CKMB & TROPONIN)    Collection Time: 05/02/17 11:30 AM   Result Value Ref Range    CK 20 (L) 26 - 192 U/L    CK - MB <1.0 <3.6 ng/ml    CK-MB Index Cannot be calulated 0.0 - 4.0 %    Troponin-I, Qt. <0.02 0.0 - 0.045 NG/ML   MAGNESIUM    Collection Time: 05/02/17 11:30 AM   Result Value Ref Range    Magnesium 2.0 1.6 - 2.6 mg/dL   METABOLIC PANEL, COMPREHENSIVE    Collection Time: 05/02/17 11:30 AM   Result Value Ref Range    Sodium 130 (L) 136 - 145 mmol/L    Potassium 4.2 3.5 - 5.5 mmol/L    Chloride 92 (L) 100 - 108 mmol/L    CO2 20 (L) 21 - 32 mmol/L    Anion gap 18 3.0 - 18 mmol/L    Glucose 627 (HH) 74 - 99 mg/dL    BUN 97 (H) 7.0 - 18 MG/DL    Creatinine 15.30 (H) 0.6 - 1.3 MG/DL    BUN/Creatinine ratio 6 (L) 12 - 20      GFR est AA 3 (L) >60 ml/min/1.73m2    GFR est non-AA 3 (L) >60 ml/min/1.73m2    Calcium 9.2 8.5 - 10.1 MG/DL    Bilirubin, total 0.6 0.2 - 1.0 MG/DL    ALT (SGPT) 20 13 - 56 U/L    AST (SGOT) 6 (L) 15 - 37 U/L    Alk.  phosphatase 151 (H) 45 - 117 U/L    Protein, total 7.6 6.4 - 8.2 g/dL    Albumin 3.2 (L) 3.4 - 5.0 g/dL    Globulin 4.4 (H) 2.0 - 4.0 g/dL    A-G Ratio 0.7 (L) 0.8 - 1.7     TSH 3RD GENERATION    Collection Time: 05/02/17 11:30 AM   Result Value Ref Range    TSH 0.68 0.36 - 3.74 uIU/mL   HEMOGLOBIN A1C WITH EAG    Collection Time: 05/02/17 11:30 AM   Result Value Ref Range    Hemoglobin A1c 12.7 (H) 4.2 - 5.6 %    Est. average glucose 318 mg/dL   POC LACTIC ACID    Collection Time: 05/02/17 11:41 AM   Result Value Ref Range    Lactic Acid (POC) 1.4 0.4 - 2.0 mmol/L   URINALYSIS W/ RFLX MICROSCOPIC    Collection Time: 05/02/17 12:10 PM   Result Value Ref Range    Color YELLOW      Appearance TURBID      Specific gravity 1.015 1.005 - 1.030      pH (UA) 6.0 5.0 - 8.0      Protein 100 (A) NEG mg/dL    Glucose >1000 (A) NEG mg/dL    Ketone NEGATIVE  NEG mg/dL    Bilirubin NEGATIVE  NEG      Blood MODERATE (A) NEG      Urobilinogen 0.2 0.2 - 1.0 EU/dL    Nitrites NEGATIVE  NEG      Leukocyte Esterase LARGE (A) NEG     URINE MICROSCOPIC ONLY    Collection Time: 05/02/17 12:10 PM   Result Value Ref Range    WBC TOO NUMEROUS TO COUNT 0 - 4 /hpf    RBC 0 to 3 0 - 5 /hpf    Epithelial cells 3+ 0 - 5 /lpf    Bacteria 3+ (A) NEG /hpf    Yeast FEW (A) NEG     POC G3    Collection Time: 05/02/17  1:03 PM   Result Value Ref Range    Device: NASAL CANNULA      Flow rate (POC) 3 L/M    pH (POC) 7.346 (L) 7.35 - 7.45      pCO2 (POC) 34.8 (L) 35.0 - 45.0 MMHG    pO2 (POC) 95 80 - 100 MMHG    HCO3 (POC) 19.0 (L) 22 - 26 MMOL/L    sO2 (POC) 97 92 - 97 %    Base deficit (POC) 6 mmol/L    Allens test (POC) YES      Total resp. rate 38      Site LEFT RADIAL      Specimen type (POC) ARTERIAL      Performed by Fallon Mcintyre    GLUCOSE, POC    Collection Time: 05/02/17  1:06 PM   Result Value Ref Range    Glucose (POC) >600 (HH) 70 - 110 mg/dL   GLUCOSTABILIZER    Collection Time: 05/02/17  2:27 PM   Result Value Ref Range    Glucose 627 mg/dL    Insulin order 11.3 units/hour    Insulin adminstered 11.3 units/hour    Multiplier 0.020     Low target 140 mg/dL    High target 180 mg/dL    D50 order 0.0 ml    D50 administered 0.00 ml    Minutes until next BG 60 min    Order initials EG     Administered initials EG          EKG interpretation per Frank Polk MD   Rate: 116  Sinus tachycardia. Flattened T waves inferiorly and laterally    X-Ray, CT or other radiology findings or impressions:  Ct Head Wo Cont    Result Date: 5/2/2017  Head CT without contrast HISTORY: Fever and altered mental status COMPARISON: None Technique: CT images of the head were obtained. All CT scans at this facility are performed using dose optimization technique as appropriate to the performed exam, to include automated exposure control, adjustment of the mA and/or kV according to patient's size (Including appropriate matching for site-specific examinations), or use of iterative reconstruction technique. FINDINGS: No intracranial hemorrhage. No shift of midline structures. No evidence for acute ischemia. Mild generalized atrophy. Chronic right caudate head lacunar infarct with chronic lacunar infarct in the right basal ganglia and external capsule. Associated right greater than left anterior periventricular white matter hypoattenuation hypoattenuation.  Additional left thalamic old lacunar infarct. There is a partially calcified the extra-axial mass in the left Intact osseous structures with diffuse sclerosis suggesting renal osteodystrophy. The visualized paranasal air sinuses are aerated. There is opacification in the mastoid air cells. Middle ears effusions. IMPRESSION: 1. No CT evidence of acute intracranial hemorrhage, mass effect or acute ischemia. 2. Chronic right basal ganglia and left thalamic old lacunar infarct. Chronic periventricular white matter small vessel ischemic disease. 3. Bilateral right greater than left mastoid air cells effusion/suggesting mastoiditis and middle ears effusion. Thank you for this referral.     Xr Chest Port    Result Date: 5/2/2017  CHEST ONE VIEW portable 1100 hours CPT CODE: 90604 HISTORY: Fever, tachycardia, confusion, shortness of breath with cancel dialysis treatment prior to arrival as well as missed dialysis 3 days ago. History of lupus COMPARISON: Left shoulder x-ray April 14, 2015. FINDINGS: Single view obtained. The cardiac silhouette is mildly enlarged. There is tortuosity of the aorta with calcified plaque. The lungs are slightly hypoinflated. Likely crowding of the bronchovascular markings in the lower one third of the lungs. No definite infiltrates demonstrated. . Pulmonary vascularity is normal. The costophrenic angles are sharply defined. Healed left-sided rib fractures. . Stable arthritic change at the left [facility] joint     IMPRESSION: Hypoinflation with likely crowding of the bronchovascular markings at the lung bases. Stable cardiomegaly. Atherosclerosis. Progress notes, Consult notes or additional Procedure notes:   10:52 AM Consult:  Discussed care with Dr. Juan Ramon López, nephrologist. Standard discussion; including history of patients chief complaint, available diagnostic results, and treatment course. States that he is aware of the pt's fever.  Stats that the pt was just startedon Celsat for Lupus and recommends sepsis workup    2:33 PM Consult:  Discussed care with Dr. Bre Resendiz, hospitalist. Standard discussion; including history of patients chief complaint, available diagnostic results, and treatment course. Agrees to admit the pt to step down    Sepsis 3-6 hour reevaluation and exam:       Reevaluation:    Vital Signs:   Patient Vitals for the past 12 hrs:   Temp Pulse Resp BP SpO2   05/02/17 1300 - 99 (!) 31 154/61 100 %   05/02/17 1245 - (!) 102 (!) 34 156/70 100 %   05/02/17 1230 - (!) 105 (!) 32 175/64 100 %   05/02/17 1215 - (!) 107 (!) 34 155/65 100 %   05/02/17 1200 - (!) 109 (!) 38 156/64 -   05/02/17 1145 - (!) 112 (!) 36 159/66 100 %   05/02/17 1130 - (!) 114 30 (!) 197/142 -   05/02/17 1115 - (!) 116 (!) 36 - -   05/02/17 1100 - (!) 117 (!) 39 184/68 -   05/02/17 1049 (!) 102.9 °F (39.4 °C) (!) 118 (!) 31 174/70 96 %       Cardiopulmonary assessment:  RESP: Chest clear, equal breath sounds. CV: S1 and S2 WNL; No murmurs, gallops or rubs. Capillary refill:  3 sec  Peripheral pulse:   96    Skin exam:  Skin color: WNL  Skin Turgor: decreased    Sepsis 3-6 hour reevaluation and exam performed at 14:40. Dispo:  Patient was admited     1. Sepsis, due to unspecified organism ([facility] [address]    2. Acute encephalopathy    3. Hyperglycemia    4. Urinary tract infection without hematuria, site unspecified    5. ESRD on hemodialysis ([facility] [address]        Patient's Medications   Start Taking    No medications on file   Continue Taking    PREDNISONE (DELTASONE) 5 MG TABLET    Take 5 mg by mouth. These Medications have changed    No medications on file   Stop Taking    No medications on file       SCRIBE ATTESTATION STATEMENT  Documented by: Nohemi Roe. Skip Frame for, and in the presence of, Joe Ochoa MD 10:52 AM     Signed by: Nohemi Roe.  Asuncion Moralez, 5/2/2017 10:52 AM     PROVIDER ATTESTATION STATEMENT  I personally performed the services described in the documentation, reviewed the documentation, as recorded by the scribe in my presence, and it accurately and completely records my words and actions.   Carmencita Reddy MD

## 2017-05-02 NOTE — PROGRESS NOTES
RENAL PROGRESS NOTE: Pt seen on dialysis. Follow up of ESRD        Subjective: Feels little better compared to earlier in the morning, no more fever. Patient is on Dialysis. Objective:    Patient Vitals for the past 12 hrs:   Temp Pulse Resp BP SpO2   05/02/17 1430 - 91 27 112/89 100 %   05/02/17 1300 - 99 (!) 31 154/61 100 %   05/02/17 1245 - (!) 102 (!) 34 156/70 100 %   05/02/17 1230 - (!) 105 (!) 32 175/64 100 %   05/02/17 1215 - (!) 107 (!) 34 155/65 100 %   05/02/17 1200 - (!) 109 (!) 38 156/64 -   05/02/17 1145 - (!) 112 (!) 36 159/66 100 %   05/02/17 1130 - (!) 114 30 (!) 197/142 -   05/02/17 1115 - (!) 116 (!) 36 - -   05/02/17 1100 - (!) 117 (!) 39 184/68 -   05/02/17 1049 (!) 102.9 °F (39.4 °C) (!) 118 (!) 31 174/70 96 %      No intake or output data in the 24 hours ending 05/02/17 1725    Physical Assessment:     General Appearance: NAD  Lung: clear to auscultation  Heart: regular rate and rhythm and no murmurs, clicks or gallops  Lower Extremities:no edema   Access:AVF, qb 350. Labs    CBC w/Diff    Recent Labs      05/02/17   1130   WBC  12.6   RBC  3.06*   HGB  9.4*   HCT  28.2*   MCV  92.2   MCH  30.7   MCHC  33.3   RDW  13.1    Recent Labs      05/02/17   1130   MONOS  2*   EOS  1   BASOS  0   RDW  13.1        Comprehensive Metabolic Profile    Recent Labs      05/02/17   1130   NA  130*   K  4.2   CL  92*   CO2  20*   BUN  97*   CREA  15.30*    Recent Labs      05/02/17   1130   CA  9.2   ALB  3.2*   TP  7.6   SGOT  6*   TBILI  0.6          Basic Metabolic Profile       results  reviwed. MEDS:Reviwed.   Current Facility-Administered Medications   Medication Dose Route Frequency Provider Last Rate Last Dose    sodium chloride (NS) flush 5-10 mL  5-10 mL IntraVENous PRN Lynne Maria MD        doxercalciferol (HECTOROL) 4 mcg/2 mL injection 1 mcg  1 mcg IntraVENous DIALYSIS ONCE Jorge Bull MD        piperacillin-tazobactam (ZOSYN) 3.375 g in 0.9% sodium chloride (MBP/ADV) 100 mL MBP  3.375 g IntraVENous Q6H Ludmila Rai MD        insulin regular (NOVOLIN R, HUMULIN R) 100 Units in 0.9% sodium chloride 100 mL infusion  0-50 Units/hr IntraVENous TITRATE Ludmila Rai MD   Stopped at 05/02/17 1720    glucose chewable tablet 16 g  4 Tab Oral PRN Ludmila Rai MD        glucagon [facility]) injection 1 mg  1 mg IntraMUSCular PRN Ludmila Rai MD        dextrose (D50W) injection syrg 12.5-25 g  25-50 mL IntraVENous PRN Ludmila Rai MD        VANCOMYCIN INFORMATION NOTE   Other CONTINUOUS Ludmila Rai MD        acetaminophen (TYLENOL) tablet 650 mg  650 mg Oral Q4H PRN Shravan Washington MD        diphenhydrAMINE (BENADRYL) capsule 25 mg  25 mg Oral Q4H PRN Shravan Washington MD        [facility]ron [facility]) injection 4 mg  4 mg IntraVENous Q4H PRN Shravan Washington MD        bisacodyl (DULCOLAX) tablet 10 mg  10 mg Oral DAILY PRN Shravan Washington MD        heparin (porcine) injection 5,000 Units  5,000 Units SubCUTAneous Q12H Shravan Washington MD         Current Outpatient Prescriptions   Medication Sig Dispense Refill    predniSONE (DELTASONE) 5 mg tablet Take 5 mg by mouth. Impression:    ESRD: Tolerating HD well. Anemia of CKD: on  Epogen. Hyperparathyroidism Yes  Sepsis. hyperglycemia  Plan  Dialysis for volume and solute management  Epogen  5000  units. Hectorol: 1 microgram,. Follow culture  , continue antibiotics. Needs better control of DM.         Tee Macario MD

## 2017-05-02 NOTE — DIALYSIS
ACUTE HEMODIALYSIS FLOW SHEET    HEMODIALYSIS ORDERS: Physician: Daron Pabon:  Finn        Duration: 3.5 hr  BFR: 400   DFR: 600   Dialysate:  Temp 36-37 K+   2    Ca+  2.5 Na 140 Bicarb 30   Weight:   kg    Bed Scale []     Unable to Obtain [x]      Dry weight/UF Goal: 2000ml Access LUE AVF  Needle Gauge 15    Heparin []  Bolus      Units    [] Hourly       Units    [x]None     Catheter locking solution NA   Pre BP:  112/89     Pulse:     100     Temperature:     Respirations: 91  Tx: NS       ml/Bolus  Other        [x] N/A   Labs: Pre        Post:        [x] N/A   Additional Orders(medications, blood products, hypotension management):       [x] N/A     [x]1445 Time Out/Safety Check  [x] [facility] Consent Verified     CATHETER ACCESS: [x]N/A   []Right   []Left   []IJ     []Fem   [] First use X-ray verified     []Tunnel                [] Non Tunneled   []No S/S infection  []Redness  []Drainage []Cultured []Swelling []Pain   []Medical Aseptic Prep Utilized   []Dressing Changed  [] Biopatch  Date:       []Clotted   []Patent   Flows: []Good  []Poor  []Reversed   If access problem,  notified: []Yes    []N/A  Date:           GRAFT/FISTULA ACCESS:  []N/A     []Right     [x]Left     [x]UE     []LE   []AVG   [x]AVF        []Buttonhole    []Medical Aseptic Prep Utilized   [x]No S/S infection  []Redness  []Drainage []Cultured []Swelling []Pain    Bruit:   [x] Strong    [] Weak       Thrill :   [x] Strong    [] Weak       Needle Gauge: 15   Length: 1 in   If access problem,  notified: []Yes     [x]N/A  Date:        Please describe access if present and not used:       GENERAL ASSESSMENT:    LUNGS:  Rate 18 SaO2%        [] N/A    [] Clear  [x] Coarse  [] Crackles  [] Wheezing        [x] Diminished     Location : []RLL   []LLL    []RUL  []TELLY   Cough: []Productive  []Dry  [x]N/A   Respirations:  [x]Easy  []Labored   Therapy:  [x]RA  []NC  l/min    Mask: []NRB []Venti       O2% []Ventilator  []Intubated  [] Trach  [] BiPaP   CARDIAC: [x]Regular      [] Irregular   [] Pericardial Rub  [] JVD        []  Monitored  [] Bedside  [] Remotely monitored [] N/A  Rhythm:    EDEMA: [] None  [x]Generalized  [] Pitting [] 1    [] 2    [] 3    [] 4                 [] Facial  [] Pedal  []  UE  [] LE   SKIN:   [] Warm  [] Hot     [x] Cold   [] Dry     [] Pale   [] Diaphoretic                  [] Flushed  [] Jaundiced  [] Cyanotic  [] Rash  [] Weeping   LOC:    [x] Alert      [x]Oriented:    [x] Person     [x] Place  []Time               [] Confused  [] Lethargic  [] Medicated  [] Non-responsive     GI / ABDOMEN:  [] Flat    [] Distended    [x] Soft    [] Firm   []  Obese                             [] Diarrhea  [x] Bowel Sounds  [] Nausea  [] Vomiting       / URINE ASSESSMENT:[] Voiding   [x] Oliguria  [] Anuria   []  Mirza     [] Incontinent    []  Incontinent Brief      []  Bathroom Privileges     PAIN: [x] 0 []1  []2   []3   []4   []5   []6   []7   []8   []9   []10            Scale 0-10  Action/Follow Up:    MOBILITY:  [] Amb    [] Amb/Assist    [x] Bed    [] Wheelchair  [] Stretcher      All Vitals and Treatment Details on Attached [address]: [facility]          Room # ED16     [] 1st Time Acute  [x] Stat  [] Routine  [] Urgent     [x] Acute Room  []  Bedside  [] ICU/CCU  [] ER   Isolation Precautions:  [] Dialysis   [] Airborne   [x] Contact    [] Reverse   Special Considerations:         [] Blood Consent Verified [x]N/A     ALLERGIES:   [] NKA  Sulfa (sulfonamide Antibiotics)    Code Status:  [x] Full Code  [] DNR  [] Other           HBsAg ONLY: Date Drawn 05/02/2017         [x]Negative []Positive []Unknown   HBsAb: Date     [] Susceptible   [x] Rexpes20 []Not Drawn  [] Drawn     Current Labs:    Date of Labs: Today [x]        Cut and paste current labs here. Results for Kristen Stevens (MRN [id number]) as of 5/2/2017 17:54   Ref.  Range 5/2/2017 11:30   WBC Latest Ref Range: 4.6 - 13.2 K/uL 12.6   RBC Latest Ref Range: 4.20 - 5.30 M/uL 3.06 (L)   HGB Latest Ref Range: 12.0 - 16.0 g/dL 9.4 (L)   HCT Latest Ref Range: 35.0 - 45.0 % 28.2 (L)   MCV Latest Ref Range: 74.0 - 97.0 FL 92.2   MCH Latest Ref Range: 24.0 - 34.0 PG 30.7   MCHC Latest Ref Range: 31.0 - 37.0 g/dL 33.3   RDW Latest Ref Range: 11.6 - 14.5 % 13.1   PLATELET Latest Ref Range: 135 - 420 K/uL 105 (L)   MPV Latest Ref Range: 9.2 - 11.8 FL 11.4   NEUTROPHILS Latest Ref Range: 40 - 73 % 94 (H)   LYMPHOCYTES Latest Ref Range: 21 - 52 % 3 (L)   MONOCYTES Latest Ref Range: 3 - 10 % 2 (L)   EOSINOPHILS Latest Ref Range: 0 - 5 % 1   BASOPHILS Latest Ref Range: 0 - 2 % 0   DF Latest Units:   AUTOMATED   ABS. NEUTROPHILS Latest Ref Range: 1.8 - 8.0 K/UL 11.9 (H)   ABS. LYMPHOCYTES Latest Ref Range: 0.9 - 3.6 K/UL 0.3 (L)   ABS. MONOCYTES Latest Ref Range: 0.05 - 1.2 K/UL 0.2   ABS. EOSINOPHILS Latest Ref Range: 0.0 - 0.4 K/UL 0.2   ABS.  BASOPHILS Latest Ref Range: 0.0 - 0.1 K/UL 0.0   INR Latest Ref Range: 0.8 - 1.2   1.1   Prothrombin time Latest Ref Range: 11.5 - 15.2 sec 13.6   aPTT Latest Ref Range: 23.0 - 36.4 SEC 30.5   Sodium Latest Ref Range: 136 - 145 mmol/L 130 (L)   Potassium Latest Ref Range: 3.5 - 5.5 mmol/L 4.2   Chloride Latest Ref Range: 100 - 108 mmol/L 92 (L)   CO2 Latest Ref Range: 21 - 32 mmol/L 20 (L)   Anion gap Latest Ref Range: 3.0 - 18 mmol/L 18   Glucose Latest Ref Range: 74 - 99 mg/dL 627 (HH)   BUN Latest Ref Range: 7.0 - 18 MG/DL 97 (H)   Creatinine Latest Ref Range: 0.6 - 1.3 MG/DL 15.30 (H)   BUN/Creatinine ratio Latest Ref Range: 12 - 20   6 (L)   Calcium Latest Ref Range: 8.5 - 10.1 MG/DL 9.2   Magnesium Latest Ref Range: 1.6 - 2.6 mg/dL 2.0   GFR est non-AA Latest Ref Range: >60 ml/min/1.73m2 3 (L)   GFR est AA Latest Ref Range: >60 ml/min/1.73m2 3 (L)   Bilirubin, total Latest Ref Range: 0.2 - 1.0 MG/DL 0.6   Protein, total Latest Ref Range: 6.4 - 8.2 g/dL 7.6   Albumin Latest Ref Range: 3.4 - 5.0 g/dL 3.2 (L)   Globulin Latest Ref Range: 2.0 - 4.0 g/dL 4.4 (H)   A-G Ratio Latest Ref Range: 0.8 - 1.7   0.7 (L)   ALT Latest Ref Range: 13 - 56 U/L 20   AST Latest Ref Range: 15 - 37 U/L 6 (L)   Alk. phosphatase Latest Ref Range: 45 - 117 U/L 151 (H)   CK Latest Ref Range: 26 - 192 U/L 20 (L)   CK-MB Index Latest Ref Range: 0.0 - 4.0 % Cannot be calulated   CK - MB Latest Ref Range: <3.6 ng/ml <1.0   Troponin-I, Qt. Latest Ref Range: 0.0 - 0.045 NG/ML <0.02   Hemoglobin A1c, (calculated) Latest Ref Range: 4.2 - 5.6 % 12.7 (H)   Est. average glucose Latest Units: mg/dL 318   HCG, Ql. Latest Ref Range: NEG   NEGATIVE   TSH Latest Ref Range: 0.36 - 3.74 uIU/mL 0.68   CULTURE, BLOOD Unknown Rpt   Hepatitis B surface Ag Latest Ref Range: <1.00 Index <0.10   Hep B surface Ag Interp. Latest Ref Range: NEG   NEGATIVE   Hepatitis B surface Ab Latest Ref Range: >10.0 mIU/mL 54.63   Hep B surface Ab Interp. Latest Ref Range: POS   POSITIVE   Hep B surface Ab comment Latest Units:   Samples with a  v...    HEP B SURFACE AG Unknown Rpt                                                                                                                                 DIET:  [x] Renal    [] Other     [] NPO     []  Diabetic      PRIMARY NURSE REPORT: First initial/Last name/Title      Pre Dialysis: Kenzie Mock RN    Time: 2589      EDUCATION:    [x] Patient [] Other         Knowledge Basis: []None []Minimal [x] Substantial   Barriers to learning  [x]N/A   [] Access Care     [] S&S of infection     [] Fluid Management     []K+     [x]Procedural    []Albumin     [] Medications     [] Tx Options     [] Transplant     [] Diet     [] Other   Teaching Tools:  [x] Explain  [] Demo  [] Handouts [] Video  Patient response:   [x] Verbalized understanding  [] Teach back  [] Return demonstration [] Requires follow up   Inappropriate due to            [address] Before each treatment:     Machine Number: [facility]                                  [x] Unit Machine # 7 with centralized RO                                  [] Portable Machine #1/RO serial # [id number]                                  [] Portable Machine #2/RO serial # [id number]                                  [] Portable Machine #3/RO serial # [id number]                                                                                                       [address]                                  [] Portable Machine #11/RO serial # [id number]                                   [] Portable Machine #12/RO serial # [id number]                                  [] Portable Machine #13/RO serial #  [id number]      Alarm Test:  Pass time 4268         Other:         [x] RO/Machine Log Complete      Temp    37.1            [x]Extracorporeal Circuit Tested for integrity   Dialysate: pH  7.2 Conductivity: Meter   14.2     HD Machine   14.3                  TCD: 14.0  Dialyzer Lot # [id number]            Blood Tubing Lot # [id number]          Saline Lot #  [id number]     CHLORINE TESTING-Before each treatment and every 4 hours    Total Chlorine: [x] less than 0.1 ppm  Time: 1400 4 Hr/2nd Check Time: 1700   (if greater than 0.1 ppm from Primary then every 30 minutes from Secondary)     TREATMENT INITIATION  with Dialysis Precautions:   [x] All Connections Secured                 [x] Saline Line Double Clamped   [x] Venous Parameters Set                  [x] Arterial Parameters Set    [x] Prime Given  250 ml                        [x]Air Foam Detector Engaged      Treatment Initiation Note: LUE AVF +bruit/thrill, cannulated x2 without complication, tx initiated and pt tolerating well.       Medication Dose Volume Route Initials Dialyzer Cleared: [] Good [x] Fair  [] Poor    Blood processed:  69.3 L  UF Removed  2000 Ml    Post Wt:     kg  POst BP:   94/52       Pulse: 89      Respirations: 18  Temperature:       Hectorol 1mcg 0.5ml IV AG Post Tx Vascular Access: AVF/AVG: Bleeding stopped Art 8 min. Juan. 6 Min                                     Catheter: Locking solution: Heparin 1:1000 Art. Juan.    N/A                                 Post Assessment:                                    Skin:  [x] Cool  [] Dry [] Diaphoretic    [] Flushed  [] Pale [] Cyanotic   [facility] Signatures Title Initials  Time Lungs: [] Clear    [x] Course  [] Crackles  [] Wheezing [] Diminished   Luster Big RN AG  Cardiac: [x] Regular   [] Irregular   [] Monitor  [] N/A  Rhythm:           Edema:  [] None    [x] General     [] Facial   [] Pedal    [] UE    [] LE       Pain: [x]0  []1  []2   []3  []4   []5   []6   []7   []8   []9   []10         Post Treatment Note: HD tx complete, patient tolerated procedure well, 2000ml net UF removed     POST TREATMENT PRIMARY NURSE HANDOFF REPORT:     First initial/Last name/Title         Post Dialysis: Arsenio Sigala RN Time:  65     Abbreviations: AVG-arterial venous graft, AVF-arterial venous fistula, IJ-Internal Jugular, Subcl-Subclavian, Fem-Femoral, Tx-treatment, AP/HR-apical heart rate, DFR-dialysate flow rate, BFR-blood flow rate, AP-arterial pressure, -venous pressure, UF-ultrafiltrate, TMP-transmembrane pressure, Juan-Venous, Art-Arterial, RO-Reverse Osmosis

## 2017-05-02 NOTE — PROCEDURES
[facility]  *** FINAL REPORT ***    Name: Mayuri Mahmood  MRN: [id number]  : 11 Sep 1970  HIS Order #: [id number]  [address] [facility] Visit #: [id number]  Date: 02 May 2017    TYPE OF TEST: Dialysis Access Duplex    REASON FOR TEST  Infection    Graft:-  Summary:   Left arm straight fistula with vein from brachial to  basilic (upper arm)  Op. Date:  Surgeon:   Elly Weber    Results:-            Velocity  Ratio  Flow Volume Stenosis            --------  -----  ----------- --------  Inflow:    281.0  Proximal:  436.0      1.6      1511. Minimal  Mid-graft: 330.0      0.8      1676. Normal  Distal:    213.0      0.6      1425. Normal  Outflow:                       N/A    Mean Flow Volume:              1537. INTERPRETATION/FINDINGS  Duplex images were obtained using 2-D gray scale, color flow, and  spectral Doppler analysis. Duplex exam of the dialysis access reveals :  1. Minimal stenosis at the proximal anastomosis of the graft. 2. No perigraft collections noted. ADDITIONAL COMMENTS    I have personally reviewed the data relevant to the interpretation of  this  study.     TECHNOLOGIST: Dayami Crow, [facility], [facility]  Signed: 2017 02:20 PM    PHYSICIAN: Zahra Capps MD  Signed: 2017 09:11 AM

## 2017-05-02 NOTE — H&P
History & Physical    Patient: Francine Saenz MRN: [id number]  CSN: [id number]    YOB: 1970  Age: 48 y.o. Sex: female      DOA: 2017    Chief Complaint:   Chief Complaint   Patient presents with    Altered mental status          HPI:     Francine Saenz is a 48 y.o.  female with history of lupus and ESRD who presented to the ED for evaluation of SOB. Patient was sent from her outpatient dialysis unit due to confusion and SOB that was noted prior to treatment being initiated - she also missed her last treatment. She was hypoxic to upper 80s, but improved with oxygen administration. On arrival to the ED she was febrile and tachycardiac without evidence for obvious source. Blood sugars was in the upper 600s on initial metabolic panel. CT of head was negative for anything acute. Patient has been referred for hospital admission for further evaluation of fever and uncontrolled DM. Past Medical History:   Diagnosis Date    CAD (coronary artery disease)     Diabetes ([address]     ESRD (end stage renal disease) on dialysis ([address]     MONDAY, WEDNESDAY, FRIDAY    Hypertension     Lupus ([address]     Stroke ([address]        Past Surgical History:   Procedure Laterality Date    HX  SECTION      HX VASCULAR ACCESS         No family history on file. Social History     Social History    Marital status:      Spouse name: N/A    Number of children: N/A    Years of education: N/A     Social History Main Topics    Smoking status: Never Smoker    Smokeless tobacco: Never Used    Alcohol use No    Drug use: No    Sexual activity: Not on file     Other Topics Concern    Not on file     Social History Narrative       Prior to Admission medications    Medication Sig Start Date End Date Taking? Authorizing Provider   predniSONE (DELTASONE) 5 mg tablet Take 5 mg by mouth.     Phys Other, MD       Allergies   Allergen Reactions    Sulfa (Sulfonamide Antibiotics) Hives Review of Systems  GENERAL: Patient alert, awake and oriented times 3, able to communicate full sentences and not in distress. HEENT: No change in vision, no earache, tinnitus, sore throat or sinus congestion. NECK: No pain or stiffness. PULMONARY: No shortness of breath, cough or wheeze. Cardiovascular: no pnd or orthopnea, no CP  GASTROINTESTINAL: No abdominal pain, nausea, vomiting or diarrhea, melena or bright red blood per rectum. GENITOURINARY: No urinary frequency, urgency, hesitancy or dysuria. MUSCULOSKELETAL: No joint or muscle pain, no back pain, no recent trauma. DERMATOLOGIC: No rash, no itching, no lesions. ENDOCRINE: No polyuria, polydipsia, no heat or cold intolerance. No recent change in weight. HEMATOLOGICAL: No anemia or easy bruising or bleeding. NEUROLOGIC: No headache, seizures, numbness, tingling or weakness. Physical Exam:     Physical Exam:  Visit Vitals    /89    Pulse 91    Temp (!) 102.9 °F (39.4 °C)    Resp 27    SpO2 100%      O2 Device: Room air    Temp (24hrs), Av.9 °F (39.4 °C), Min:102.9 °F (39.4 °C), Max:102.9 °F (39.4 °C)             General:  Alert, cooperative, no distress, appears stated age. Head: NCAT. Eyes:  Conjunctivae/corneas clear, sclerae anicteric. EOMI. Nose: Nares normal, no drainage. Throat: OP clear. Neck: Supple, symmetrical, trachea midline. Back:   No bony or paraspinous TTP. Lungs:   Clear, no wheezes. Effort nonlabored. Chest wall:  No tenderness or deformity. Heart:  RRR. Abdomen: Soft, NTTP. Extremities: No edema. Pulses: Extermities warm, no ischemia. Skin:  No rashes or lesions   Neurologic: Awake and alert. Moves extremities spontaneously.          Labs Reviewed:  BMP:   Lab Results   Component Value Date/Time     (L) 2017 11:30 AM    K 4.2 2017 11:30 AM    CL 92 (L) 2017 11:30 AM    CO2 20 (L) 2017 11:30 AM    AGAP 18 2017 11:30 AM     (HH) 05/02/2017 11:30 AM    BUN 97 (H) 05/02/2017 11:30 AM    CREA 15.30 (H) 05/02/2017 11:30 AM    GFRAA 3 (L) 05/02/2017 11:30 AM    GFRNA 3 (L) 05/02/2017 11:30 AM          CBC WITH AUTOMATED DIFF    Collection Time: 05/02/17 11:30 AM   Result Value Ref Range    WBC 12.6 4.6 - 13.2 K/uL    RBC 3.06 (L) 4.20 - 5.30 M/uL    HGB 9.4 (L) 12.0 - 16.0 g/dL    HCT 28.2 (L) 35.0 - 45.0 %    MCV 92.2 74.0 - 97.0 FL    MCH 30.7 24.0 - 34.0 PG    MCHC 33.3 31.0 - 37.0 g/dL    RDW 13.1 11.6 - 14.5 %    PLATELET 629 (L) 173 - 420 K/uL    MPV 11.4 9.2 - 11.8 FL    NEUTROPHILS 94 (H) 40 - 73 %    LYMPHOCYTES 3 (L) 21 - 52 %    MONOCYTES 2 (L) 3 - 10 %    EOSINOPHILS 1 0 - 5 %    BASOPHILS 0 0 - 2 %    ABS. NEUTROPHILS 11.9 (H) 1.8 - 8.0 K/UL    ABS. LYMPHOCYTES 0.3 (L) 0.9 - 3.6 K/UL    ABS. MONOCYTES 0.2 0.05 - 1.2 K/UL    ABS. EOSINOPHILS 0.2 0.0 - 0.4 K/UL    ABS. BASOPHILS 0.0 0.0 - 0.1 K/UL    DF AUTOMATED           CT head:  IMPRESSION:     1. No CT evidence of acute intracranial hemorrhage, mass effect or acute  ischemia. 2. Chronic right basal ganglia and left thalamic old lacunar infarct. Chronic  periventricular white matter small vessel ischemic disease. 3. Bilateral right greater than left mastoid air cells effusion/suggesting  mastoiditis and middle ears effusion. CXR:  IMPRESSION:     Hypoinflation with likely crowding of the bronchovascular markings at the lung  bases. Stable cardiomegaly. Atherosclerosis. 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 IV insulin for now, transition to [place] therapy when able. Empiric antibiotics, follow cultures. HD per nephrology. Monitor lytes. Expect sodium to improve with improvement in glycemic control.   Diabetes educator consult prior to discharge. DVT/GI Prophylaxis: Hep SQ   Discussed with patient and family at bedside, questions answered.       Akanksha Brooks MD  5/2/2017 4:52 PM

## 2017-05-02 NOTE — IP AVS SNAPSHOT
Current Discharge Medication List  
  
START taking these medications Dose & Instructions Dispensing Information Comments Morning Noon Evening Bedtime  
 acetaminophen 325 mg tablet Commonly known as:  TYLENOL Your last dose was: Your next dose is:    
   
   
 Dose:  650 mg Take 2 Tabs by mouth every four (4) hours as needed. Quantity:  100 Tab Refills:  0  
     
   
   
   
  
 aspirin delayed-release 81 mg tablet Your last dose was: Your next dose is:    
   
   
 Dose:  81 mg Take 1 Tab by mouth daily. Quantity:  100 Tab Refills:  0  
     
   
   
   
  
 b complex-vitamin c-folic acid 1 mg capsule Commonly known as:  Jhon [place] Your last dose was: Your next dose is:    
   
   
 Dose:  1 Cap Take 1 Cap by mouth daily. Quantity:  100 Cap Refills:  0  
     
   
   
   
  
 bisacodyl 5 mg EC tablet Commonly known as:  DULCOLAX Your last dose was: Your next dose is:    
   
   
 Dose:  10 mg Take 2 Tabs by mouth daily as needed for Constipation. Quantity:  100 Tab Refills:  0  
     
   
   
   
  
 cinacalcet 30 mg tablet Commonly known as:  SENSIPAR Your last dose was: Your next dose is:    
   
   
 Dose:  30 mg Take 1 Tab by mouth daily (with breakfast) for 90 days. Quantity:  100 Tab Refills:  0  
     
   
   
   
  
 diphenhydrAMINE 25 mg capsule Commonly known as:  BENADRYL Your last dose was: Your next dose is:    
   
   
 Dose:  25 mg Take 1 Cap by mouth every four (4) hours as needed for Itching for up to 10 days. Quantity:  60 Cap Refills:  0  
     
   
   
   
  
 insulin glargine 100 unit/mL injection Commonly known as:  LANTUS Your last dose was: Your next dose is:    
   
   
 20 units hs Quantity:  1 Vial  
Refills:  0  
     
   
   
   
  
 insulin lispro 100 unit/mL injection Commonly known as:  HUMALOG Your last dose was: Your next dose is:    
   
   
 Less than 150- 0 [phone] [id number]-[phone] and call MD  
 Quantity:  1 Vial  
Refills:  0 Lactobacillus Acidoph & Bulgar 1 million cell Tab tablet Commonly known as:  Latisha Simmons Your last dose was: Your next dose is:    
   
   
 Dose:  2 Tab Take 2 Tabs by mouth two (2) times a day. Quantity:  60 Tab Refills:  0  
     
   
   
   
  
 pravastatin 20 mg tablet Commonly known as:  PRAVACHOL Your last dose was: Your next dose is:    
   
   
 Dose:  20 mg Take 1 Tab by mouth nightly. Quantity:  100 Tab Refills:  0  
     
   
   
   
  
 sevelamer carbonate 800 mg Tab tab Commonly known as:  Sean Arteaga Your last dose was: Your next dose is:    
   
   
 Dose:  1600 mg Take 2 Tabs by mouth three (3) times daily (with meals). Quantity:  100 Tab Refills:  0 CONTINUE these medications which have NOT CHANGED Dose & Instructions Dispensing Information Comments Morning Noon Evening Bedtime  
 predniSONE 5 mg tablet Commonly known as:  Prakash Marcos Your last dose was: Your next dose is:    
   
   
 Dose:  5 mg Take 5 mg by mouth. Refills:  0 Where to Get Your Medications Information on where to get these meds will be given to you by the nurse or doctor. ! Ask your nurse or doctor about these medications  
  acetaminophen 325 mg tablet  
 aspirin delayed-release 81 mg tablet  
 b complex-vitamin c-folic acid 1 mg capsule  
 bisacodyl 5 mg EC tablet  
 cinacalcet 30 mg tablet  
 diphenhydrAMINE 25 mg capsule  
 insulin glargine 100 unit/mL injection  
 insulin lispro 100 unit/mL injection Lactobacillus Acidoph & Bulgar 1 million cell Tab tablet  
 pravastatin 20 mg tablet  
 sevelamer carbonate 800 mg Tab tab

## 2017-05-03 ENCOUNTER — APPOINTMENT (OUTPATIENT)
Dept: GENERAL RADIOLOGY | Age: 47
DRG: 720 | End: 2017-05-03
Attending: INTERNAL MEDICINE
Payer: MEDICAID

## 2017-05-03 ENCOUNTER — APPOINTMENT (OUTPATIENT)
Dept: CT IMAGING | Age: 47
DRG: 720 | End: 2017-05-03
Attending: INTERNAL MEDICINE
Payer: MEDICAID

## 2017-05-03 PROBLEM — E11.9 DM TYPE 2 (DIABETES MELLITUS, TYPE 2) (HCC): Status: ACTIVE | Noted: 2017-05-03

## 2017-05-03 LAB
ADMINISTERED INITIALS, ADMINIT: NORMAL
ANION GAP BLD CALC-SCNC: 14 MMOL/L (ref 3–18)
BASOPHILS # BLD AUTO: 0 K/UL (ref 0–0.1)
BASOPHILS # BLD: 0 % (ref 0–2)
BUN SERPL-MCNC: 37 MG/DL (ref 7–18)
BUN/CREAT SERPL: 5 (ref 12–20)
CALCIUM SERPL-MCNC: 9.5 MG/DL (ref 8.5–10.1)
CALCIUM SERPL-MCNC: 9.5 MG/DL (ref 8.5–10.1)
CHLORIDE SERPL-SCNC: 102 MMOL/L (ref 100–108)
CHOLEST SERPL-MCNC: 162 MG/DL
CO2 SERPL-SCNC: 22 MMOL/L (ref 21–32)
CREAT SERPL-MCNC: 7.4 MG/DL (ref 0.6–1.3)
D50 ADMINISTERED, D50ADM: 0 ML
D50 ORDER, D50ORD: 0 ML
DIFFERENTIAL METHOD BLD: ABNORMAL
EOSINOPHIL # BLD: 0.4 K/UL (ref 0–0.4)
EOSINOPHIL NFR BLD: 3 % (ref 0–5)
ERYTHROCYTE [DISTWIDTH] IN BLOOD BY AUTOMATED COUNT: 13.3 % (ref 11.6–14.5)
GLUCOSE BLD STRIP.AUTO-MCNC: 101 MG/DL (ref 70–110)
GLUCOSE BLD STRIP.AUTO-MCNC: 107 MG/DL (ref 70–110)
GLUCOSE BLD STRIP.AUTO-MCNC: 133 MG/DL (ref 70–110)
GLUCOSE BLD STRIP.AUTO-MCNC: 292 MG/DL (ref 70–110)
GLUCOSE BLD STRIP.AUTO-MCNC: 87 MG/DL (ref 70–110)
GLUCOSE SERPL-MCNC: 86 MG/DL (ref 74–99)
GLUCOSE, GLC: 101 MG/DL
GLUCOSE, GLC: 107 MG/DL
GLUCOSE, GLC: 87 MG/DL
HBA1C MFR BLD: 12.4 % (ref 4.2–5.6)
HCT VFR BLD AUTO: 30.9 % (ref 35–45)
HDLC SERPL-MCNC: 32 MG/DL (ref 40–60)
HDLC SERPL: 5.1 {RATIO} (ref 0–5)
HGB BLD-MCNC: 10.4 G/DL (ref 12–16)
HIGH TARGET, HITG: 180 MG/DL
INSULIN ADMINSTERED, INSADM: 0 UNITS/HOUR
INSULIN ADMINSTERED, INSADM: 0 UNITS/HOUR
INSULIN ADMINSTERED, INSADM: 0.3 UNITS/HOUR
INSULIN ORDER, INSORD: 0 UNITS/HOUR
INSULIN ORDER, INSORD: 0 UNITS/HOUR
INSULIN ORDER, INSORD: 0.3 UNITS/HOUR
LACTATE BLD-SCNC: 0.8 MMOL/L (ref 0.4–2)
LDLC SERPL CALC-MCNC: 52.6 MG/DL (ref 0–100)
LIPID PROFILE,FLP: ABNORMAL
LOW TARGET, LOT: 140 MG/DL
LYMPHOCYTES # BLD AUTO: 9 % (ref 21–52)
LYMPHOCYTES # BLD: 1.5 K/UL (ref 0.9–3.6)
MCH RBC QN AUTO: 31 PG (ref 24–34)
MCHC RBC AUTO-ENTMCNC: 33.7 G/DL (ref 31–37)
MCV RBC AUTO: 92.2 FL (ref 74–97)
MINUTES UNTIL NEXT BG, NBG: 60 MIN
MONOCYTES # BLD: 0.3 K/UL (ref 0.05–1.2)
MONOCYTES NFR BLD AUTO: 2 % (ref 3–10)
MULTIPLIER, MUL: 0
MULTIPLIER, MUL: 0
MULTIPLIER, MUL: 0.01
NEUTS SEG # BLD: 14.4 K/UL (ref 1.8–8)
NEUTS SEG NFR BLD AUTO: 86 % (ref 40–73)
ORDER INITIALS, ORDINIT: NORMAL
PHOSPHATE SERPL-MCNC: 4 MG/DL (ref 2.5–4.9)
PLATELET # BLD AUTO: 137 K/UL (ref 135–420)
PMV BLD AUTO: 12.3 FL (ref 9.2–11.8)
POTASSIUM SERPL-SCNC: 3.8 MMOL/L (ref 3.5–5.5)
PTH-INTACT SERPL-MCNC: 997 PG/ML (ref 14–72)
RBC # BLD AUTO: 3.35 M/UL (ref 4.2–5.3)
SODIUM SERPL-SCNC: 138 MMOL/L (ref 136–145)
TRIGL SERPL-MCNC: 387 MG/DL (ref ?–150)
VANCOMYCIN SERPL-MCNC: <0.8 UG/ML (ref 5–40)
VLDLC SERPL CALC-MCNC: 77.4 MG/DL
WBC # BLD AUTO: 16.6 K/UL (ref 4.6–13.2)

## 2017-05-03 PROCEDURE — 80202 ASSAY OF VANCOMYCIN: CPT | Performed by: EMERGENCY MEDICINE

## 2017-05-03 PROCEDURE — 80048 BASIC METABOLIC PNL TOTAL CA: CPT | Performed by: INTERNAL MEDICINE

## 2017-05-03 PROCEDURE — 74011250636 HC RX REV CODE- 250/636: Performed by: EMERGENCY MEDICINE

## 2017-05-03 PROCEDURE — 74011250636 HC RX REV CODE- 250/636: Performed by: FAMILY MEDICINE

## 2017-05-03 PROCEDURE — 85025 COMPLETE CBC W/AUTO DIFF WBC: CPT | Performed by: INTERNAL MEDICINE

## 2017-05-03 PROCEDURE — 80061 LIPID PANEL: CPT | Performed by: INTERNAL MEDICINE

## 2017-05-03 PROCEDURE — 74177 CT ABD & PELVIS W/CONTRAST: CPT

## 2017-05-03 PROCEDURE — 83036 HEMOGLOBIN GLYCOSYLATED A1C: CPT | Performed by: INTERNAL MEDICINE

## 2017-05-03 PROCEDURE — 71020 XR CHEST PA LAT: CPT

## 2017-05-03 PROCEDURE — 74011250637 HC RX REV CODE- 250/637: Performed by: INTERNAL MEDICINE

## 2017-05-03 PROCEDURE — 74011636637 HC RX REV CODE- 636/637: Performed by: INTERNAL MEDICINE

## 2017-05-03 PROCEDURE — 74011636320 HC RX REV CODE- 636/320: Performed by: FAMILY MEDICINE

## 2017-05-03 PROCEDURE — 83605 ASSAY OF LACTIC ACID: CPT

## 2017-05-03 PROCEDURE — 74011000258 HC RX REV CODE- 258: Performed by: EMERGENCY MEDICINE

## 2017-05-03 PROCEDURE — 83970 ASSAY OF PARATHORMONE: CPT | Performed by: INTERNAL MEDICINE

## 2017-05-03 PROCEDURE — 84100 ASSAY OF PHOSPHORUS: CPT | Performed by: INTERNAL MEDICINE

## 2017-05-03 PROCEDURE — 82962 GLUCOSE BLOOD TEST: CPT

## 2017-05-03 PROCEDURE — 65660000000 HC RM CCU STEPDOWN

## 2017-05-03 RX ORDER — INSULIN GLARGINE 100 [IU]/ML
5 INJECTION, SOLUTION SUBCUTANEOUS
Status: ACTIVE | OUTPATIENT
Start: 2017-05-03 | End: 2017-05-04

## 2017-05-03 RX ORDER — INSULIN GLARGINE 100 [IU]/ML
15 INJECTION, SOLUTION SUBCUTANEOUS DAILY
Status: DISCONTINUED | OUTPATIENT
Start: 2017-05-04 | End: 2017-05-04

## 2017-05-03 RX ORDER — PRAVASTATIN SODIUM 20 MG/1
20 TABLET ORAL
Status: DISCONTINUED | OUTPATIENT
Start: 2017-05-03 | End: 2017-05-09 | Stop reason: HOSPADM

## 2017-05-03 RX ORDER — INSULIN GLARGINE 100 [IU]/ML
10 INJECTION, SOLUTION SUBCUTANEOUS DAILY
Status: DISCONTINUED | OUTPATIENT
Start: 2017-05-04 | End: 2017-05-03

## 2017-05-03 RX ORDER — INSULIN LISPRO 100 [IU]/ML
INJECTION, SOLUTION INTRAVENOUS; SUBCUTANEOUS
Status: DISCONTINUED | OUTPATIENT
Start: 2017-05-03 | End: 2017-05-09 | Stop reason: HOSPADM

## 2017-05-03 RX ORDER — UREA 10 %
2 LOTION (ML) TOPICAL 2 TIMES DAILY
Status: DISCONTINUED | OUTPATIENT
Start: 2017-05-03 | End: 2017-05-09 | Stop reason: HOSPADM

## 2017-05-03 RX ORDER — CHOLESTYRAMINE 4 G/4.8G
4 POWDER, FOR SUSPENSION ORAL 2 TIMES DAILY WITH MEALS
Status: DISPENSED | OUTPATIENT
Start: 2017-05-03 | End: 2017-05-04

## 2017-05-03 RX ORDER — MIDODRINE HYDROCHLORIDE 2.5 MG/1
2.5 TABLET ORAL 2 TIMES DAILY WITH MEALS
Status: DISCONTINUED | OUTPATIENT
Start: 2017-05-03 | End: 2017-05-06

## 2017-05-03 RX ORDER — INSULIN GLARGINE 100 [IU]/ML
15 INJECTION, SOLUTION SUBCUTANEOUS DAILY
Status: DISCONTINUED | OUTPATIENT
Start: 2017-05-03 | End: 2017-05-03

## 2017-05-03 RX ORDER — PREDNISONE 5 MG/1
5 TABLET ORAL
Status: DISCONTINUED | OUTPATIENT
Start: 2017-05-04 | End: 2017-05-09 | Stop reason: HOSPADM

## 2017-05-03 RX ORDER — FLUTICASONE PROPIONATE 50 MCG
2 SPRAY, SUSPENSION (ML) NASAL DAILY
Status: DISPENSED | OUTPATIENT
Start: 2017-05-03 | End: 2017-05-08

## 2017-05-03 RX ORDER — ASPIRIN 81 MG/1
81 TABLET ORAL DAILY
Status: DISCONTINUED | OUTPATIENT
Start: 2017-05-03 | End: 2017-05-09 | Stop reason: HOSPADM

## 2017-05-03 RX ADMIN — DIATRIZOATE MEGLUMINE AND DIATRIZOATE SODIUM 30 ML: 660; 100 LIQUID ORAL; RECTAL at 16:06

## 2017-05-03 RX ADMIN — ONDANSETRON 4 MG: 2 INJECTION INTRAMUSCULAR; INTRAVENOUS at 13:18

## 2017-05-03 RX ADMIN — HEPARIN SODIUM 5000 UNITS: 5000 INJECTION, SOLUTION INTRAVENOUS; SUBCUTANEOUS at 06:49

## 2017-05-03 RX ADMIN — SEVELAMER CARBONATE 1600 MG: 800 TABLET, FILM COATED ORAL at 09:51

## 2017-05-03 RX ADMIN — SIMVASTATIN 10 MG: 10 TABLET, FILM COATED ORAL at 09:53

## 2017-05-03 RX ADMIN — PRAVASTATIN SODIUM 20 MG: 20 TABLET ORAL at 22:12

## 2017-05-03 RX ADMIN — CINACALCET HYDROCHLORIDE 30 MG: 30 TABLET, COATED ORAL at 09:51

## 2017-05-03 RX ADMIN — PIPERACILLIN AND TAZOBACTAM 3.38 G: 3; .375 INJECTION, POWDER, LYOPHILIZED, FOR SOLUTION INTRAVENOUS; PARENTERAL at 00:30

## 2017-05-03 RX ADMIN — ASPIRIN 81 MG: 81 TABLET, COATED ORAL at 12:36

## 2017-05-03 RX ADMIN — NEPHROCAP 1 CAPSULE: 1 CAP ORAL at 09:52

## 2017-05-03 RX ADMIN — INSULIN LISPRO 9 UNITS: 100 INJECTION, SOLUTION INTRAVENOUS; SUBCUTANEOUS at 12:38

## 2017-05-03 RX ADMIN — PIPERACILLIN AND TAZOBACTAM 3.38 G: 3; .375 INJECTION, POWDER, LYOPHILIZED, FOR SOLUTION INTRAVENOUS; PARENTERAL at 06:48

## 2017-05-03 RX ADMIN — LACTOBACILLUS TAB 2 TABLET: TAB at 12:37

## 2017-05-03 RX ADMIN — MIDODRINE HYDROCHLORIDE 5 MG: 2.5 TABLET ORAL at 09:51

## 2017-05-03 RX ADMIN — IOPAMIDOL 100 ML: 612 INJECTION, SOLUTION INTRAVENOUS at 18:33

## 2017-05-03 RX ADMIN — PIPERACILLIN AND TAZOBACTAM 3.38 G: 3; .375 INJECTION, POWDER, LYOPHILIZED, FOR SOLUTION INTRAVENOUS; PARENTERAL at 12:47

## 2017-05-03 NOTE — PROGRESS NOTES
Progress Note    Blu Vaughn  48 y.o. Admit Date: 5/2/2017  Active Problems:    UTI (urinary tract infection) (5/2/2017) POA: Unknown      Hyperglycemia (5/2/2017) POA: Unknown      Sepsis ([facility] (5/2/2017) POA: Unknown      Acute febrile illness (5/2/2017) POA: Unknown      ESRD on hemodialysis ([facility] (5/2/2017) POA: Unknown      Lupus (systemic lupus erythematosus) ([facility] (5/2/2017) POA: Unknown            Subjective:     Patient is still in ER, feeling good , no more Fever. , no more Diarrhea, Blood sugar is better, was dialyzed yesterday. A comprehensive review of systems was negative except for that written in the History of Present Illness.     Objective:     Visit Vitals    /74    Pulse 96    Temp 98.2 °F (36.8 °C)    Resp 25    Ht 5' 9\" (1.753 m)    Wt 64.9 kg (143 lb)    SpO2 94%    BMI 21.12 kg/m2         Intake/Output Summary (Last 24 hours) at 05/03/17 1309  Last data filed at 05/02/17 1815   Gross per 24 hour   Intake                0 ml   Output             2000 ml   Net            -2000 ml       Current Facility-Administered Medications   Medication Dose Route Frequency Provider Last Rate Last Dose    Piperacillin-tazobactam (ZOSYN) 0.75 gm Supplemental Dosing by Pharmacy   Other Rx Dosing/Monitoring Viviane Schmidt MD        insulin lispro (HUMALOG) injection   SubCUTAneous AC&HS Wen Orr MD   9 Units at 05/03/17 1238    midodrine (PROAMITINE) tablet 2.5 mg  2.5 mg Oral BID WITH MEALS Wen Orr MD        Lactobacillus Acidoph & Bulgar [facility]) tablet 2 Tab  2 Tab Oral BID Wen Orr MD   2 Tab at 05/03/17 1237    cholestyramine light (QUESTRAN LITE) packet 4 g  4 g Oral BID WITH MEALS Wen Orr MD        aspirin delayed-release tablet 81 mg  81 mg Oral DAILY Wen Orr MD   81 mg at 05/03/17 1236    pravastatin (PRAVACHOL) tablet 20 mg  20 mg Oral QHS Wen Orr MD        [START ON 5/4/2017] insulin glargine (LANTUS) injection 10 Units  10 Units SubCUTAneous DAILY Jailyn Walker MD        fluticasone (FLONASE) 50 mcg/actuation nasal spray 2 Spray  2 Spray Both Nostrils DAILY Jailyn Walker MD        sodium chloride (NS) flush 5-10 mL  5-10 mL IntraVENous PRN Mitzi Adan MD        doxercalciferol (HECTOROL) 4 mcg/2 mL injection 1 mcg  1 mcg IntraVENous DIALYSIS ONCE Brianne Oneal MD   1 mcg at 05/02/17 1730    piperacillin-tazobactam (ZOSYN) 3.375 g in 0.9% sodium chloride (MBP/ADV) 100 mL MBP  3.375 g IntraVENous Q6H Mitzi Adan  mL/hr at 05/03/17 1247 3.375 g at 05/03/17 1247    glucose chewable tablet 16 g  4 Tab Oral PRN Mitzi Adan MD        glucagon [facility]) injection 1 mg  1 mg IntraMUSCular PRN Mitzi Adan MD        dextrose (D50W) injection syrg 12.5-25 g  25-50 mL IntraVENous PRN Mitzi Adan MD        VANCOMYCIN INFORMATION NOTE   Other CONTINUOUS Mitzi Adan MD        acetaminophen (TYLENOL) tablet 650 mg  650 mg Oral Q4H PRN Camron Calhoun MD        diphenhydrAMINE (BENADRYL) capsule 25 mg  25 mg Oral Q4H PRN Camron Calhoun MD        ondansetron [facility]) injection 4 mg  4 mg IntraVENous Q4H PRN Camron Calhoun MD        bisacodyl (DULCOLAX) tablet 10 mg  10 mg Oral DAILY PRN Camron Calhoun MD        heparin (porcine) injection 5,000 Units  5,000 Units SubCUTAneous Q12H Camron Calhoun MD   5,000 Units at 05/03/17 0649    cinacalcet (SENSIPAR) tablet 30 mg  30 mg Oral DAILY WITH BREAKFAST Brianne Oneal MD   30 mg at 05/03/17 0951    sevelamer carbonate (RENVELA) tab 1,600 mg  1,600 mg Oral TID WITH MEALS Brianne Oneal MD   1,600 mg at 05/03/17 0951    B complex-vitaminC-folic acid (NEPHROCAP) cap  1 Cap Oral DAILY Brianne Oneal MD   1 Cap at 05/03/17 6762     Current Outpatient Prescriptions   Medication Sig Dispense Refill    predniSONE (DELTASONE) 5 mg tablet Take 5 mg by mouth.           Physical Exam:     Physical Exam:   General:  Alert, cooperative, no distress, appears stated age. Neck: Supple, symmetrical, trachea midline, no adenopathy, thyroid: no enlargement/tenderness/nodules, no carotid bruit and no JVD. Lungs:   Clear to auscultation bilaterally. Heart:  Regular rate and rhythm, S1, S2 normal, no murmur, click, rub or gallop. Abdomen:   Soft, non-tender. Bowel sounds normal. No masses,  No organomegaly. Extremities: Extremities normal, atraumatic, no cyanosis or edema, AVF has good thrill & bruit   Pulses: 2+ and symmetric all extremities. Skin: Skin color, texture, turgor normal. No rashes or lesions         Data Review:    CBC w/Diff    Recent Labs      05/03/17 0520 05/02/17   1130   WBC  16.6*  12.6   RBC  3.35*  3.06*   HGB  10.4*  9.4*   HCT  30.9*  28.2*   MCV  92.2  92.2   MCH  31.0  30.7   MCHC  33.7  33.3   RDW  13.3  13.1    Recent Labs      05/03/17 0520 05/02/17   1130   MONOS  2*  2*   EOS  3  1   BASOS  0  0   RDW  13.3  13.1        Comprehensive Metabolic Profile    Recent Labs      05/03/17   0520 05/02/17   1130   NA  138  130*   K  3.8  4.2   CL  102  92*   CO2  22  20*   BUN  37*  97*   CREA  7.40*  15.30*    Recent Labs      05/03/17 0520 05/02/17   1130   CA  9.5  9.5  9.2   PHOS  4.0   --    ALB   --   3.2*   TP   --   7.6   SGOT   --   6*   TBILI   --   0.6        Urine & blood culture growing gm negative ro                Impression:       Active Hospital Problems    Diagnosis Date Noted    UTI (urinary tract infection) 05/02/2017    Hyperglycemia 05/02/2017    Sepsis ([facility] 05/02/2017    Acute febrile illness 05/02/2017    ESRD on hemodialysis ([facility] 05/02/2017    Lupus (systemic lupus erythematosus) ([facility] 05/02/2017      Type 2 DM      Plan:     Continue Insulin, adjust Zosyn dose, dialysis tomorrow,no need for Cellcept.       Kayode Knight MD

## 2017-05-03 NOTE — CONSULTS
Infectious Disease Consultation Note    Requested by: Dr. Luciana crystal    Reason: sepsis, gram negative bacteremia    Current abx Prior abx   Piperacillin/tazobactam, vancomycin since 5/3      Lines:       Assessment :    48 y.o.  female with history of type 2 DM (uncontrolled), lupus, ESRD on HD who presented to the ED on 5/2/17 for evaluation of SOB. Clinical picture consistent with sepsis (POA) due to gram negative bloodstream infection. Exact source of bacteremia unclear. D/D: pyelonephritis versus undiagnosed GI infection. Patient's immunocompromised state will mask the full presentation. Hence, will obtain imaging studies to evaluate for gi infection, obstructive uropathy    Diarrhea on admission could be due to cellcept. Rule out undiagnosed Gi infection, including c.diff    Recommendations:    1. Continue pip/tazo  2. D/c vancomycin  3. Obtain ct abdomen/pelvis with contrast (discussed with dr. Avel Maya)  4. F/u stool c.diff pcr  5. F/u cbc, clinically      Thank you for consultation request. Above plan was discussed in details with patient,  and dr Avel Maya. Please call me if any further questions or concerns. Will continue to participate in the care of this patient. HPI:    48 y.o.  female with history of type 2 DM (uncontrolled), lupus (on cellcept, prednisone), ESRD on HD who presented to the ED on 5/2/17 for evaluation of SOB. Patient is not a very good historian. i obtained history from review of [facility] care records, talking to patient. Patient was sent from her outpatient dialysis unit due to confusion and SOB that was noted prior to treatment being initiated - she also missed her last treatment. She was hypoxic to upper 80s, but improved with oxygen administration. On arrival to the ED she was febrile and tachycardiac without evidence for obvious source. Blood sugars was in the upper 600s on initial metabolic panel. CT of head was negative for anything acute. Patient was admitted further evaluation of fever and uncontrolled DM. She was given pip/tazo, vancomycin in ed and i have been consulted for further recommendations. Her urine cultures and blood cultures on admission now reveal gram negative rods. Per records, patient had diarrhea on admission. No diarrhea per patient. Patient states that she had some lower back pain recently. She states that she didn't feel good since the past 4 days. Patient denies headaches, visual disturbances, sore throat, runny nose, earaches, cp, sob, chills, cough, abdominal pain, diarrhea, burning micturition, pain or weakness in extremities. she denies flank pain. she denies recent sick contacts. No h/o recent travel. No known h/o MRSA colonization or infection in the past.        Past Medical History:   Diagnosis Date    CAD (coronary artery disease)     [facility]     ESRD (end stage renal disease) on dialysis ([facility]     MONDAY, WEDNESDAY, FRIDAY    Hypertension     Lupus ([facility]     Stroke ([facility]        Past Surgical History:   Procedure Laterality Date    HX  SECTION      HX VASCULAR ACCESS         Patient's home Medications    PREDNISONE (DELTASONE) 5 MG TABLET    Take 5 mg by mouth.        Current Facility-Administered Medications   Medication Dose Route Frequency    Piperacillin-tazobactam (ZOSYN) 0.75 gm Supplemental Dosing by Pharmacy   Other Rx Dosing/Monitoring    insulin lispro (HUMALOG) injection   SubCUTAneous AC&HS    midodrine (PROAMITINE) tablet 2.5 mg  2.5 mg Oral BID WITH MEALS    Lactobacillus Acidoph & Bulgar [facility]) tablet 2 Tab  2 Tab Oral BID    cholestyramine light (QUESTRAN LITE) packet 4 g  4 g Oral BID WITH MEALS    aspirin delayed-release tablet 81 mg  81 mg Oral DAILY    pravastatin (PRAVACHOL) tablet 20 mg  20 mg Oral QHS    [START ON 2017] insulin glargine (LANTUS) injection 10 Units  10 Units SubCUTAneous DAILY    fluticasone (FLONASE) 50 mcg/actuation nasal spray 2 Spray  2 Spray Both Nostrils DAILY    [START ON 2017] piperacillin-tazobactam (ZOSYN) 2.25 g in 0.9% sodium chloride (MBP/ADV) 50 mL MBP  2.25 g IntraVENous Q12H    sodium chloride (NS) flush 5-10 mL  5-10 mL IntraVENous PRN    doxercalciferol (HECTOROL) 4 mcg/2 mL injection 1 mcg  1 mcg IntraVENous DIALYSIS ONCE    glucose chewable tablet 16 g  4 Tab Oral PRN    glucagon (GLUCAGEN) injection 1 mg  1 mg IntraMUSCular PRN    dextrose (D50W) injection syrg 12.5-25 g  25-50 mL IntraVENous PRN    VANCOMYCIN INFORMATION NOTE   Other CONTINUOUS    acetaminophen (TYLENOL) tablet 650 mg  650 mg Oral Q4H PRN    diphenhydrAMINE (BENADRYL) capsule 25 mg  25 mg Oral Q4H PRN    ondansetron (ZOFRAN) injection 4 mg  4 mg IntraVENous Q4H PRN    bisacodyl (DULCOLAX) tablet 10 mg  10 mg Oral DAILY PRN    heparin (porcine) injection 5,000 Units  5,000 Units SubCUTAneous Q12H    cinacalcet (SENSIPAR) tablet 30 mg  30 mg Oral DAILY WITH BREAKFAST    sevelamer carbonate (RENVELA) tab 1,600 mg  1,600 mg Oral TID WITH MEALS    B complex-vitaminC-folic acid (NEPHROCAP) cap  1 Cap Oral DAILY     Current Outpatient Prescriptions   Medication Sig    predniSONE (DELTASONE) 5 mg tablet Take 5 mg by mouth. Allergies: Sulfa (sulfonamide antibiotics)    History reviewed. No pertinent family history. Social History     Social History    Marital status:      Spouse name: N/A    Number of children: N/A    Years of education: N/A     Occupational History    Not on file.      Social History Main Topics    Smoking status: Never Smoker    Smokeless tobacco: Never Used    Alcohol use No    Drug use: No    Sexual activity: Not on file     Other Topics Concern    Not on file     Social History Narrative     History   Smoking Status    Never Smoker   Smokeless Tobacco    Never Used        Temp (24hrs), Av.2 °F (36.8 °C), Min:98.2 °F (36.8 °C), Max:98.2 °F (36.8 °C)    Visit Vitals    /68    Pulse 92  Temp 98.2 °F (36.8 °C)    Resp 21    Ht 5' 9\" (1.753 m)    Wt 64.9 kg (143 lb)    SpO2 95%    BMI 21.12 kg/m2       ROS: 12 point ROS obtained in details. Pertinent positives as mentioned in HPI,   otherwise negative    Physical Exam:    General: Well developed, well nourished female laying on the bed AAOx3 in no acute distress. General:   awake alert and oriented   HEENT:  Normocephalic, atraumatic, PERRL, EOMI, no scleral icterus or pallor; no conjunctival hemmohage;  nasal and oral mucous are moist and without evidence of lesions. No thrush. Neck supple, no bruits. Lymph Nodes:   no cervical, axillary or inguinal adenopathy   Lungs:   non-labored, bilaterally clear to auscultation- no crackles wheezes rales or rhonchi   Heart:  RRR, s1 and s2;  rubs or gallops, no edema, + pedal pulses   Abdomen:  soft, non-distended, active bowel sounds, no hepatomegaly, no splenomegaly. Non-tender   Genitourinary:  deferred   Extremities:   no clubbing, cyanosis; no joint effusions or swelling;  Full ROM of all large joints to the upper and lower extremities; muscle mass appropriate for age   Neurologic:  No gross focal motor or sensory deficits                        Skin:  No rash or ulcers noted   Back:  no spinal or paraspinal muscle tenderness or rigidity, no CVA tenderness     Psychiatric:  No suicidal or homicidal ideations, appropriate mood and affect         Labs: Results:   Chemistry Recent Labs      05/03/17   0520  05/02/17   1130   GLU  86  627*   NA  138  130*   K  3.8  4.2   CL  102  92*   CO2  22  20*   BUN  37*  97*   CREA  7.40*  15.30*   CA  9.5  9.5  9.2   AGAP  14  18   BUCR  5*  6*   AP   --   151*   TP   --   7.6   ALB   --   3.2*   GLOB   --   4.4*   AGRAT   --   0.7*      CBC w/Diff Recent Labs      05/03/17   0520  05/02/17   1130   WBC  16.6*  12.6   RBC  3.35*  3.06*   HGB  10.4*  9.4*   HCT  30.9*  28.2*   PLT  137  105*   GRANS  86*  94*   LYMPH  9*  3*   EOS  3  1      Microbiology Recent Labs      05/02/17   1210  05/02/17   1145  05/02/17   1130   CULT  [id number]  COLONIES/mL  GRAM NEGATIVE RODS  *  CULTURE IN PROGRESS,FURTHER UPDATES TO FOLLOW  CULTURE IN PROGRESS,FURTHER UPDATES TO FOLLOW          RADIOLOGY:    All available imaging studies/reports in [facility] for this admission were reviewed    Dr. Jessa Escalona, Infectious Disease Specialist  [phone]  May 3, 2017  2:47 PM

## 2017-05-03 NOTE — ED NOTES
Pt. Incontinent of stool; moderate loose BM; noe care given. New chux and brief applied. To CT via hospital bed and in house transport.

## 2017-05-03 NOTE — ED NOTES
Patient provided a hospital bed, ED technician assisted patient with bed bath. Patient AOX3, denies complaints. Vital signs are stable. Will continue to monitor.

## 2017-05-03 NOTE — ROUTINE PROCESS
TRANSFER - OUT REPORT:    Verbal report given to Alondra Gallo RN on Vikki Fitzgerald  being transferred to [facility] [phone] for routine progression of care       Report consisted of patients Situation, Background, Assessment and   Recommendations(SBAR). Information from the following report(s) SBAR was reviewed with the receiving nurse. Lines:   Peripheral IV 05/02/17 Right Antecubital (Active)   Site Assessment Clean, dry, & intact 5/2/2017 11:30 AM   Phlebitis Assessment 0 5/2/2017 11:30 AM   Infiltration Assessment 0 5/2/2017 11:30 AM   Dressing Status Clean, dry, & intact 5/2/2017 11:30 AM   Dressing Type Transparent 5/2/2017 11:30 AM   Hub Color/Line Status Flushed;Patent 5/2/2017 11:30 AM        Opportunity for questions and clarification was provided.       Patient transported with:   Monitor  Tech

## 2017-05-03 NOTE — ED NOTES
Bedside shift change report given to Rdaha Thomas RN (oncoming nurse) by Grupo Jones RN (offgoing nurse). Report included the following information SBAR, ED Summary, Procedure Summary, MAR and Recent Results.

## 2017-05-03 NOTE — PROGRESS NOTES
SUBJECTIVE:    Saw her earlier. No chest or abdominal pain. Had headaches yesterday but no resolved. Dry cough present. No N/V. Loose BMs present. No sore throat. Lives with mother. Wheelchair bound for 4 years. Knows where she is, her , her aunt's name, [place] president's name, current year. As per aunt - she was feeling sick and did not go for HD for a week. OBJECTIVE:    Visit Vitals    /63    Pulse (!) 103    Temp 98.2 °F (36.8 °C)    Resp (!) 33    SpO2 93%     HEENT:  No pallor. No sinus tenderness over maxillary sinuses  Neck: no JVD  CVS: RRR  RS: CTA bilaterally but diminished in bases, poor efforts, no wheezes  CVS: RRR  GI: NT, ND, BS +  Skin: stage one on coccygeal area otherwise hyperpigmented skin in buttock and lower back area  CNS: moves both UE well. LEs motor strength 2-3/5. Alert and oriented x 3  Extremities: no pedal edema. ASSESSMENT:    1. Acute metabolic encephalopathy, improved  2. GNR sepsis  3. Fluid overload due to missed HD  4. Fever with immunocompromised status  5. Hyponatremia due to fluid overload, resolved  6. Uncontrolled DM 2, A1c 12.3  7. Loose BMs 2 times a day  8. ESRD  9. Anemia of ESRD  10. Chronic right basal ganglia and left thalamic old lacunar infarct. 11.Bilateral right greater than left mastoid air cells effusion/suggesting mastoiditis and middle ears effusion. 12. HTN  13. Lupus  14. Hypoxia due to fluid overload and Bibasilar atelectasis, resolved. 15. Leukocytosis  16. Pressure sore on coccygeal area  17.  Hypertriglyceridemia     PLAN:    Cont current antibiotic [zosyn and vancomycin]  ID consulted - talked to dr. Opal Zavala will be repeated  Add Aspirin and statin for lacunar infarcts   D/c insulin drip and start Lantus and ISS  Will cont immunosuppressants if okay with ID  Wound care/PT/OT consulted   Add Flonase and probiotic  Transfer her to telemetry  Doppler did not show fluid collection  Cont HD per nephrology    --> total time greater than 35 minutes    CMP:   Lab Results   Component Value Date/Time     05/03/2017 05:20 AM    K 3.8 05/03/2017 05:20 AM     05/03/2017 05:20 AM    CO2 22 05/03/2017 05:20 AM    AGAP 14 05/03/2017 05:20 AM    GLU 86 05/03/2017 05:20 AM    BUN 37 (H) 05/03/2017 05:20 AM    CREA 7.40 (H) 05/03/2017 05:20 AM    GFRAA 7 (L) 05/03/2017 05:20 AM    GFRNA 6 (L) 05/03/2017 05:20 AM    CA 9.5 05/03/2017 05:20 AM    CA 9.5 05/03/2017 05:20 AM    MG 2.0 05/02/2017 11:30 AM    PHOS 4.0 05/03/2017 05:20 AM    ALB 3.2 (L) 05/02/2017 11:30 AM    TP 7.6 05/02/2017 11:30 AM    GLOB 4.4 (H) 05/02/2017 11:30 AM    AGRAT 0.7 (L) 05/02/2017 11:30 AM    SGOT 6 (L) 05/02/2017 11:30 AM    ALT 20 05/02/2017 11:30 AM     CBC:   Lab Results   Component Value Date/Time    WBC 16.6 (H) 05/03/2017 05:20 AM    HGB 10.4 (L) 05/03/2017 05:20 AM    HCT 30.9 (L) 05/03/2017 05:20 AM     05/03/2017 05:20 AM     Recent Glucose Results:   Lab Results   Component Value Date/Time    GLU 86 05/03/2017 05:20 AM     (HH) 05/02/2017 11:30 AM

## 2017-05-04 ENCOUNTER — APPOINTMENT (OUTPATIENT)
Dept: ULTRASOUND IMAGING | Age: 47
DRG: 720 | End: 2017-05-04
Attending: INTERNAL MEDICINE
Payer: MEDICAID

## 2017-05-04 ENCOUNTER — APPOINTMENT (OUTPATIENT)
Dept: MRI IMAGING | Age: 47
DRG: 720 | End: 2017-05-04
Attending: INTERNAL MEDICINE
Payer: MEDICAID

## 2017-05-04 LAB
ANA SER QL: POSITIVE
ANION GAP BLD CALC-SCNC: 15 MMOL/L (ref 3–18)
BACTERIA SPEC CULT: ABNORMAL
BASOPHILS # BLD AUTO: 0.1 K/UL (ref 0–0.1)
BASOPHILS # BLD: 1 % (ref 0–2)
BUN SERPL-MCNC: 42 MG/DL (ref 7–18)
BUN/CREAT SERPL: 5 (ref 12–20)
C3 SERPL-MCNC: 109 MG/DL (ref 82–167)
C4 SERPL-MCNC: 41 MG/DL (ref 14–44)
CALCIUM SERPL-MCNC: 8.5 MG/DL (ref 8.5–10.1)
CHLORIDE SERPL-SCNC: 99 MMOL/L (ref 100–108)
CO2 SERPL-SCNC: 21 MMOL/L (ref 21–32)
CREAT SERPL-MCNC: 8.79 MG/DL (ref 0.6–1.3)
DIFFERENTIAL METHOD BLD: ABNORMAL
DSDNA AB SER-ACNC: 3 IU/ML (ref 0–9)
EOSINOPHIL # BLD: 0.8 K/UL (ref 0–0.4)
EOSINOPHIL NFR BLD: 4 % (ref 0–5)
ERYTHROCYTE [DISTWIDTH] IN BLOOD BY AUTOMATED COUNT: 13.4 % (ref 11.6–14.5)
GLUCOSE BLD STRIP.AUTO-MCNC: 161 MG/DL (ref 70–110)
GLUCOSE BLD STRIP.AUTO-MCNC: 297 MG/DL (ref 70–110)
GLUCOSE SERPL-MCNC: 187 MG/DL (ref 74–99)
HCT VFR BLD AUTO: 27.1 % (ref 35–45)
HGB BLD-MCNC: 8.9 G/DL (ref 12–16)
LYMPHOCYTES # BLD AUTO: 9 % (ref 21–52)
LYMPHOCYTES # BLD: 1.6 K/UL (ref 0.9–3.6)
MCH RBC QN AUTO: 30.5 PG (ref 24–34)
MCHC RBC AUTO-ENTMCNC: 32.8 G/DL (ref 31–37)
MCV RBC AUTO: 92.8 FL (ref 74–97)
MONOCYTES # BLD: 1.2 K/UL (ref 0.05–1.2)
MONOCYTES NFR BLD AUTO: 7 % (ref 3–10)
NEUTS SEG # BLD: 14.4 K/UL (ref 1.8–8)
NEUTS SEG NFR BLD AUTO: 79 % (ref 40–73)
PHOSPHATE SERPL-MCNC: 3.9 MG/DL (ref 2.5–4.9)
PLATELET # BLD AUTO: 124 K/UL (ref 135–420)
PMV BLD AUTO: 12.1 FL (ref 9.2–11.8)
POTASSIUM SERPL-SCNC: 3.6 MMOL/L (ref 3.5–5.5)
RBC # BLD AUTO: 2.92 M/UL (ref 4.2–5.3)
SERVICE CMNT-IMP: ABNORMAL
SODIUM SERPL-SCNC: 135 MMOL/L (ref 136–145)
WBC # BLD AUTO: 18 K/UL (ref 4.6–13.2)

## 2017-05-04 PROCEDURE — 72195 MRI PELVIS W/O DYE: CPT

## 2017-05-04 PROCEDURE — 65660000000 HC RM CCU STEPDOWN

## 2017-05-04 PROCEDURE — 93306 TTE W/DOPPLER COMPLETE: CPT

## 2017-05-04 PROCEDURE — 87186 SC STD MICRODIL/AGAR DIL: CPT | Performed by: INTERNAL MEDICINE

## 2017-05-04 PROCEDURE — 74011250637 HC RX REV CODE- 250/637: Performed by: INTERNAL MEDICINE

## 2017-05-04 PROCEDURE — 74011636637 HC RX REV CODE- 636/637: Performed by: INTERNAL MEDICINE

## 2017-05-04 PROCEDURE — 80048 BASIC METABOLIC PNL TOTAL CA: CPT | Performed by: INTERNAL MEDICINE

## 2017-05-04 PROCEDURE — 87077 CULTURE AEROBIC IDENTIFY: CPT | Performed by: INTERNAL MEDICINE

## 2017-05-04 PROCEDURE — 36415 COLL VENOUS BLD VENIPUNCTURE: CPT | Performed by: INTERNAL MEDICINE

## 2017-05-04 PROCEDURE — 90935 HEMODIALYSIS ONE EVALUATION: CPT

## 2017-05-04 PROCEDURE — 74011250636 HC RX REV CODE- 250/636: Performed by: FAMILY MEDICINE

## 2017-05-04 PROCEDURE — 76700 US EXAM ABDOM COMPLETE: CPT

## 2017-05-04 PROCEDURE — 84100 ASSAY OF PHOSPHORUS: CPT | Performed by: INTERNAL MEDICINE

## 2017-05-04 PROCEDURE — 82962 GLUCOSE BLOOD TEST: CPT

## 2017-05-04 PROCEDURE — 74011250636 HC RX REV CODE- 250/636: Performed by: INTERNAL MEDICINE

## 2017-05-04 PROCEDURE — 74011250637 HC RX REV CODE- 250/637: Performed by: FAMILY MEDICINE

## 2017-05-04 PROCEDURE — 85025 COMPLETE CBC W/AUTO DIFF WBC: CPT | Performed by: INTERNAL MEDICINE

## 2017-05-04 PROCEDURE — 94760 N-INVAS EAR/PLS OXIMETRY 1: CPT

## 2017-05-04 PROCEDURE — 74011000258 HC RX REV CODE- 258: Performed by: INTERNAL MEDICINE

## 2017-05-04 PROCEDURE — 87040 BLOOD CULTURE FOR BACTERIA: CPT | Performed by: INTERNAL MEDICINE

## 2017-05-04 RX ORDER — INSULIN GLARGINE 100 [IU]/ML
20 INJECTION, SOLUTION SUBCUTANEOUS DAILY
Status: DISCONTINUED | OUTPATIENT
Start: 2017-05-05 | End: 2017-05-07

## 2017-05-04 RX ADMIN — PIPERACILLIN SODIUM AND TAZOBACTAM SODIUM 2.25 G: 2; .25 INJECTION, POWDER, LYOPHILIZED, FOR SOLUTION INTRAVENOUS at 02:35

## 2017-05-04 RX ADMIN — INSULIN LISPRO 9 UNITS: 100 INJECTION, SOLUTION INTRAVENOUS; SUBCUTANEOUS at 17:58

## 2017-05-04 RX ADMIN — INSULIN LISPRO 3 UNITS: 100 INJECTION, SOLUTION INTRAVENOUS; SUBCUTANEOUS at 21:04

## 2017-05-04 RX ADMIN — PRAVASTATIN SODIUM 20 MG: 20 TABLET ORAL at 21:02

## 2017-05-04 RX ADMIN — HEPARIN SODIUM 5000 UNITS: 5000 INJECTION, SOLUTION INTRAVENOUS; SUBCUTANEOUS at 17:56

## 2017-05-04 RX ADMIN — MIDODRINE HYDROCHLORIDE 2.5 MG: 2.5 TABLET ORAL at 17:58

## 2017-05-04 RX ADMIN — DOXERCALCIFEROL 1 MCG: 4 INJECTION, SOLUTION INTRAVENOUS at 10:20

## 2017-05-04 RX ADMIN — SEVELAMER CARBONATE 1600 MG: 800 TABLET, FILM COATED ORAL at 17:55

## 2017-05-04 RX ADMIN — ACETAMINOPHEN 650 MG: 325 TABLET, FILM COATED ORAL at 10:55

## 2017-05-04 RX ADMIN — MIDODRINE HYDROCHLORIDE 2.5 MG: 2.5 TABLET ORAL at 18:00

## 2017-05-04 RX ADMIN — LACTOBACILLUS TAB 2 TABLET: TAB at 17:57

## 2017-05-04 RX ADMIN — INSULIN GLARGINE 15 UNITS: 100 INJECTION, SOLUTION SUBCUTANEOUS at 10:58

## 2017-05-04 RX ADMIN — HEPARIN SODIUM 5000 UNITS: 5000 INJECTION, SOLUTION INTRAVENOUS; SUBCUTANEOUS at 06:17

## 2017-05-04 NOTE — PROGRESS NOTES
OT orders received and chart reviewed. Pt not seen for skilled OT due to:  []  Nausea/vomiting  []  Eating  []  Pain  []  Pt lethargic  [x]  Off Unit  Will f/u later as patients' schedule allows. Thank you.   Sofia Loza OTR/SUSAN

## 2017-05-04 NOTE — DIABETES MGMT
GLYCEMIC CONTROL PLAN OF CARE    Assessment/Recommendations:  Pt is a 48year old female with a past medical history significant for CAD, diabetes, ESRD on dialysis, hypertension, lupus, and stroke. Blood glucose fluctuating. Noted Lantus insulin increased to 20 units daily. Monitor closely for hypoglycemia. Pt reports tolerating diet and eating well. Diabetes education. Most recent blood glucose values:  5/3/2017 00:01 5/3/2017 01:16 5/3/2017 02:35 5/3/2017 12:31 5/3/2017 21:38   87 101 107 292 (H) 133 (H)     Current A1C of 12.4% is equivalent to average blood glucose of 309 mg/dl over the past 2-3 months.     Current hospital diabetes medications:   Lantus insulin 20 units daily   correctional Lispro insulin 4 times daily ACHS (very insulin resistant scale)    Previous day's insulin requirements:   9 units of correctional Lispro insulin     Home diabetes medications: Pt reports that she is not taking any home diabetes medications    Diet:  Diabetic, Consistent Carbohydrate, Renal, Soft Solids, Heart Healthy    Education:  __x__Refer to Diabetes Education Record             ____Education not indicated at this time      Evelyn Gillespie RD, CDE

## 2017-05-04 NOTE — PROGRESS NOTES
SUBJECTIVE:    Saw her in HD room. No chest or abdominal pain. No cough. No N/V/D. Lives with mother. Wheelchair bound for 4 years. OBJECTIVE:    Visit Vitals    /68    Pulse 95    Temp 99.5 °F (37.5 °C) (Oral)    Resp (!) 36    Ht 5' 9\" (1.753 m)    Wt 63.8 kg (140 lb 10.5 oz)    SpO2 91%  Comment: applied O2    Breastfeeding No    BMI 20.77 kg/m2     HEENT:  No pallor. CVS: RRR  RS: CTA bilaterally but diminished in bases, poor efforts, no wheezes  CVS: RRR  GI: NT, ND, BS +  Skin: stage one on coccygeal area otherwise hyperpigmented skin in buttock and lower back area  CNS: moves both UE well. LEs motor strength 2-3/5. Alert and oriented x 3  Extremities: no pedal edema. ASSESSMENT:    1. Acute metabolic encephalopathy due to sepsis, improved  2. GNR sepsis  3. Fluid overload due to missed HD  4. Fever with immunocompromised status, improved  5. Hyponatremia due to fluid overload, resolved  6. Uncontrolled DM 2, A1c 12. 3. Off insulin drip. 7. Loose BMs 2 times a day  8. ESRD  9. Anemia of ESRD  10. Chronic right basal ganglia and left thalamic old lacunar infarct. 11.Bilateral right greater than left mastoid air cells effusion/suggesting mastoiditis and middle ears effusion. 12. HTN  13. Lupus  14. Hypoxia due to fluid overload and Bibasilar atelectasis, resolved. 15. Leukocytosis  16. Pressure sore on coccygeal area  17. Hypertriglyceridemia   18. Right hepatic lobe lesion on CT  19. Low-attenuation in the upper pole the left kidney most likely a cyst.  20. Lytic lesion of the right iliac bone measuring 3.6 x 1.4 cm.  In the setting of renal osteodystrophy could be a brown tumor    PLAN:    Cont current antibiotic per ID  CXR will be repeated  cont Lantus and ISS  CT report reviewed  US abdomen and MRI pelvis ordered  Ortho consulted - dr. Samie Krabbe HD per nephrology    --> total time greater than 30 minutes    CMP:   Lab Results   Component Value Date/Time     (L) 05/04/2017 03:18 AM    K 3.6 05/04/2017 03:18 AM    CL 99 (L) 05/04/2017 03:18 AM    CO2 21 05/04/2017 03:18 AM    AGAP 15 05/04/2017 03:18 AM     (H) 05/04/2017 03:18 AM    BUN 42 (H) 05/04/2017 03:18 AM    CREA 8.79 (H) 05/04/2017 03:18 AM    GFRAA 6 (L) 05/04/2017 03:18 AM    GFRNA 5 (L) 05/04/2017 03:18 AM    CA 8.5 05/04/2017 03:18 AM    PHOS 3.9 05/04/2017 03:18 AM     CBC:   Lab Results   Component Value Date/Time    WBC 18.0 (H) 05/04/2017 03:18 AM    HGB 8.9 (L) 05/04/2017 03:18 AM    HCT 27.1 (L) 05/04/2017 03:18 AM     (L) 05/04/2017 03:18 AM     Recent Glucose Results:   Lab Results   Component Value Date/Time     (H) 05/04/2017 03:18 AM

## 2017-05-04 NOTE — PROGRESS NOTES
RENAL PROGRESS NOTE: Pt seen on dialysis. Follow up of ESRD     Present on Admission:   DM type 2 (diabetes mellitus, type 2) (HCC)         Subjective: Feels ok, had 1 loose stool. Patient is on Dialysis.      Objective:    Patient Vitals for the past 12 hrs:   Temp Pulse Resp BP SpO2   05/04/17 1300 - 94 - 108/63 -   05/04/17 1230 - 93 - 105/58 -   05/04/17 1200 - (!) 58 - (!) 182/100 -   05/04/17 1130 - 95 - 117/68 -   05/04/17 1100 - 85 - 160/79 -   05/04/17 1030 - 81 - 126/89 -   05/04/17 1000 - 64 - (!) 122/98 98 %   05/04/17 0945 99.5 °F (37.5 °C) 69 (!) 36 135/77 91 %   05/04/17 0817 98.9 °F (37.2 °C) 97 18 141/80 94 %   05/04/17 0515 98.5 °F (36.9 °C) 88 17 159/88 96 %        Intake/Output Summary (Last 24 hours) at 05/04/17 1316  Last data filed at 05/04/17 0924   Gross per 24 hour   Intake              410 ml   Output                0 ml   Net              410 ml       Physical Assessment:     General Appearance: NAD  Lung: clear to auscultation  Heart: regular rate and rhythm and no murmurs, clicks or gallops  Lower Extremities: no  edema   Access: AVF , qb 400,     Labs    CBC w/Diff    Recent Labs      05/04/17 0318 05/03/17 0520 05/02/17   1130   WBC  18.0*  16.6*  12.6   RBC  2.92*  3.35*  3.06*   HGB  8.9*  10.4*  9.4*   HCT  27.1*  30.9*  28.2*   MCV  92.8  92.2  92.2   MCH  30.5  31.0  30.7   MCHC  32.8  33.7  33.3   RDW  13.4  13.3  13.1    Recent Labs      05/04/17 0318 05/03/17   0520 05/02/17   1130   MONOS  7  2*  2*   EOS  4  3  1   BASOS  1  0  0   RDW  13.4  13.3  13.1        Comprehensive Metabolic Profile    Recent Labs      05/04/17 0318 05/03/17   0520 05/02/17   1130   NA  135*  138  130*   K  3.6  3.8  4.2   CL  99*  102  92*   CO2  21  22  20*   BUN  42*  37*  97*   CREA  8.79*  7.40*  15.30*    Recent Labs      05/04/17 0318 05/03/17 0520 05/02/17   1130   CA  8.5  9.5  9.5  9.2   PHOS  3.9  4.0   --    ALB   --    --   3.2*   TP   --    --   7.6   SGOT --    --   6*   TBILI   --    --   0.6          Basic Metabolic Profile       results  reviwed. MEDS:Reviwed.   Current Facility-Administered Medications   Medication Dose Route Frequency Provider Last Rate Last Dose    piperacillin-tazobactam (ZOSYN) 0.75 g in 0.9% sodium chloride 50 mL IVPB  0.75 g IntraVENous ONCE Olayinka Argueta MD        [START ON 5/5/2017] insulin glargine (LANTUS) injection 20 Units  20 Units SubCUTAneous DAILY Jesus Betts MD        predniSONE (DELTASONE) tablet 5 mg  5 mg Oral DAILY WITH BREAKFAST Farheen Daugherty MD        Piperacillin-tazobactam (ZOSYN) 0.75 gm Supplemental Dosing by Pharmacy   Other Rx Dosing/Monitoring Bipin Reyes MD        insulin lispro (HUMALOG) injection   SubCUTAneous AC&HS Sarah Melendez MD   Stopped at 05/03/17 2200    midodrine (PROAMITINE) tablet 2.5 mg  2.5 mg Oral BID WITH MEALS Sarah Melendez MD        Lactobacillus Acidoph & Bulgar [facility]) tablet 2 Tab  2 Tab Oral BID Sarah Melendez MD   2 Tab at 05/03/17 1237    cholestyramine light (QUESTRAN LITE) packet 4 g  4 g Oral BID WITH MEALS Sarah Melendez MD   Stopped at 05/04/17 0800    aspirin delayed-release tablet 81 mg  81 mg Oral DAILY Sarah Melendez MD   Stopped at 05/04/17 0900    pravastatin (PRAVACHOL) tablet 20 mg  20 mg Oral QHS Sarah Melendez MD   20 mg at 05/03/17 2212    fluticasone (FLONASE) 50 mcg/actuation nasal spray 2 Spray  2 Spray Both Nostrils DAILY Sarah Melendez MD        piperacillin-tazobactam (ZOSYN) 2.25 g in 0.9% sodium chloride (MBP/ADV) 50 mL MBP  2.25 g IntraVENous Q12H Luda Charles  mL/hr at 05/04/17 0235 2.25 g at 05/04/17 0235    sodium chloride (NS) flush 5-10 mL  5-10 mL IntraVENous PRN Bipin Reyes MD        doxercalciferol (HECTOROL) 4 mcg/2 mL injection 1 mcg  1 mcg IntraVENous DIALYSIS ONCE Luda Charles MD   1 mcg at 05/04/17 1020    glucose chewable tablet 16 g  4 Tab Oral PRN Bipin Reyes MD        glucagon (GLUCAGEN) injection 1 mg  1 mg IntraMUSCular PRN Brooke Russo MD        dextrose (D50W) injection syrg 12.5-25 g  25-50 mL IntraVENous PRN Brooke Russo MD        acetaminophen (TYLENOL) tablet 650 mg  650 mg Oral Q4H PRN Nilo Villa MD   650 mg at 05/04/17 1055    diphenhydrAMINE (BENADRYL) capsule 25 mg  25 mg Oral Q4H PRN Nilo Villa MD        ondansetron [facility]) injection 4 mg  4 mg IntraVENous Q4H PRN Nilo Villa MD   4 mg at 05/03/17 1318    bisacodyl (DULCOLAX) tablet 10 mg  10 mg Oral DAILY PRN Nilo Villa MD        heparin (porcine) injection 5,000 Units  5,000 Units SubCUTAneous Q12H Nilo Villa MD   5,000 Units at 05/04/17 0617    cinacalcet (SENSIPAR) tablet 30 mg  30 mg Oral DAILY WITH BREAKFAST John Carver MD   30 mg at 05/03/17 0951    sevelamer carbonate (RENVELA) tab 1,600 mg  1,600 mg Oral TID WITH MEALS John Carver MD   1,600 mg at 05/03/17 0951    B complex-vitaminC-folic acid (NEPHROCAP) cap  1 Cap Oral DAILY John Carver MD   Stopped at 05/04/17 0900       Impression:    ESRD: Tolerating HD well. Anemia of CKD: on  Epogen. Hyperparathyroidism Yes  Gm negative Sepsis. Plan  Dialysis for volume and solute management  Epogen 1000  units. Hectorol: 1 microgram, continue Sensipar. Antibiotic coverage as per ID'. s recommendation .         John Carver MD

## 2017-05-04 NOTE — CDMP QUERY
Please clarify if you feel  the acute metabolic encephalopathy is due to the sepsis?    other  reason        Thank you,   Scott Allen  RN,  BSN,  CCDS

## 2017-05-04 NOTE — PROGRESS NOTES
Infectious Disease Progress Note    Requested by: Dr. Luciana crystal    Reason: sepsis, gram negative bacteremia    Current abx Prior abx   Piperacillin/tazobactam,  since 5/3 Vancomycin 5/3     Lines:       Assessment :    48 y.o.  female with history of type 2 DM (uncontrolled), lupus, ESRD on HD who presented to the ED on 5/2/17 for evaluation of SOB. Clinical picture consistent with sepsis (POA) due to anerobic gram negative bloodstream infection. Exact source of bacteremia unclear. anerobic gram negative bacteremia would favor undiagnosed GI infection. Patient's immunocompromised state will mask the full presentation. Ct abdomen/pelvis didn't reveal any obvious inflammation/infection    Diarrhea on admission could be due to cellcept. No further diarrhea. Clinically better    Recommendations:    1. Continue pip/tazo  2. F/u ID of gnr in blood cultures 5/2  3. Repeat blood cultures today  4. F/u cbc, clinically    Above plan was discussed in details with patient,  and dr Lissette Garcia. Please call me if any further questions or concerns. Will continue to participate in the care of this patient. subjective:    Feels better. Patient denies headaches, visual disturbances, sore throat, runny nose, earaches, cp, sob, chills, cough, abdominal pain, diarrhea, burning micturition, pain or weakness in extremities. she denies flank pain. Patient's home Medications    PREDNISONE (DELTASONE) 5 MG TABLET    Take 5 mg by mouth.        Current Facility-Administered Medications   Medication Dose Route Frequency    predniSONE (DELTASONE) tablet 5 mg  5 mg Oral DAILY WITH BREAKFAST    Piperacillin-tazobactam (ZOSYN) 0.75 gm Supplemental Dosing by Pharmacy   Other Rx Dosing/Monitoring    insulin lispro (HUMALOG) injection   SubCUTAneous AC&HS    midodrine (PROAMITINE) tablet 2.5 mg  2.5 mg Oral BID WITH MEALS    Lactobacillus Acidoph & Bulgar (FLORANEX) tablet 2 Tab  2 Tab Oral BID    cholestyramine light (QUESTRAN LITE) packet 4 g  4 g Oral BID WITH MEALS    aspirin delayed-release tablet 81 mg  81 mg Oral DAILY    pravastatin (PRAVACHOL) tablet 20 mg  20 mg Oral QHS    fluticasone (FLONASE) 50 mcg/actuation nasal spray 2 Spray  2 Spray Both Nostrils DAILY    piperacillin-tazobactam (ZOSYN) 2.25 g in 0.9% sodium chloride (MBP/ADV) 50 mL MBP  2.25 g IntraVENous Q12H    insulin glargine (LANTUS) injection 15 Units  15 Units SubCUTAneous DAILY    sodium chloride (NS) flush 5-10 mL  5-10 mL IntraVENous PRN    doxercalciferol (HECTOROL) 4 mcg/2 mL injection 1 mcg  1 mcg IntraVENous DIALYSIS ONCE    glucose chewable tablet 16 g  4 Tab Oral PRN    glucagon (GLUCAGEN) injection 1 mg  1 mg IntraMUSCular PRN    dextrose (D50W) injection syrg 12.5-25 g  25-50 mL IntraVENous PRN    acetaminophen (TYLENOL) tablet 650 mg  650 mg Oral Q4H PRN    diphenhydrAMINE (BENADRYL) capsule 25 mg  25 mg Oral Q4H PRN    ondansetron (ZOFRAN) injection 4 mg  4 mg IntraVENous Q4H PRN    bisacodyl (DULCOLAX) tablet 10 mg  10 mg Oral DAILY PRN    heparin (porcine) injection 5,000 Units  5,000 Units SubCUTAneous Q12H    cinacalcet (SENSIPAR) tablet 30 mg  30 mg Oral DAILY WITH BREAKFAST    sevelamer carbonate (RENVELA) tab 1,600 mg  1,600 mg Oral TID WITH MEALS    B complex-vitaminC-folic acid (NEPHROCAP) cap  1 Cap Oral DAILY       Allergies: Sulfa (sulfonamide antibiotics)    Temp (24hrs), Av.5 °F (36.9 °C), Min:98.2 °F (36.8 °C), Max:98.9 °F (37.2 °C)    Visit Vitals    /80 (BP 1 Location: Right arm, BP Patient Position: At rest)    Pulse 97    Temp 98.9 °F (37.2 °C)    Resp 18    Ht 5' 9\" (1.753 m)    Wt 63.8 kg (140 lb 10.5 oz)    SpO2 94%    Breastfeeding No    BMI 20.77 kg/m2       ROS: 12 point ROS obtained in details.  Pertinent positives as mentioned in HPI,   otherwise negative    Physical Exam:    General: Well developed, well nourished female laying on the bed AAOx3 in no acute distress. General:   awake alert and oriented   HEENT:  Normocephalic, atraumatic, PERRL, EOMI, no scleral icterus or pallor; no conjunctival hemmohage;  nasal and oral mucous are moist and without evidence of lesions. No thrush. Neck supple, no bruits. Lymph Nodes:   no cervical, axillary or inguinal adenopathy   Lungs:   non-labored, bilaterally clear to auscultation- no crackles wheezes rales or rhonchi   Heart:  RRR, s1 and s2;  rubs or gallops, no edema, + pedal pulses   Abdomen:  soft, non-distended, active bowel sounds, no hepatomegaly, no splenomegaly. Non-tender   Genitourinary:  deferred   Extremities:   no clubbing, cyanosis; no joint effusions or swelling;  Full ROM of all large joints to the upper and lower extremities; muscle mass appropriate for age   Neurologic:  No gross focal motor or sensory deficits                        Skin:  No rash or ulcers noted   Back:  no spinal or paraspinal muscle tenderness or rigidity, no CVA tenderness     Psychiatric:  No suicidal or homicidal ideations, appropriate mood and affect         Labs: Results:   Chemistry Recent Labs      05/04/17 0318 05/03/17   0520 05/02/17   1130   GLU  187*  86  627*   NA  135*  138  130*   K  3.6  3.8  4.2   CL  99*  102  92*   CO2  21  22  20*   BUN  42*  37*  97*   CREA  8.79*  7.40*  15.30*   CA  8.5  9.5  9.5  9.2   AGAP  15  14  18   BUCR  5*  5*  6*   AP   --    --   151*   TP   --    --   7.6   ALB   --    --   3.2*   GLOB   --    --   4.4*   AGRAT   --    --   0.7*      CBC w/Diff Recent Labs      05/04/17 0318 05/03/17   0520 05/02/17   1130   WBC  18.0*  16.6*  12.6   RBC  2.92*  3.35*  3.06*   HGB  8.9*  10.4*  9.4*   HCT  27.1*  30.9*  28.2*   PLT  124*  137  105*   GRANS  79*  86*  94*   LYMPH  9*  9*  3*   EOS  4  3  1      Microbiology Recent Labs      05/02/17   1210  05/02/17   1145  05/02/17   1130   CULT  [id number]  COLONIES/mL  ESCHERICHIA COLI  *  ANAEROBIC BOTTLE  GRAM NEGATIVE RODS  *  ANAEROBIC BOTTLE  GRAM NEGATIVE RODS  *          RADIOLOGY:    All available imaging studies/reports in [facility] for this admission were reviewed    Dr. Jimmy Teran, Infectious Disease Specialist  [phone]  May 4, 2017  2:47 PM

## 2017-05-04 NOTE — PROGRESS NOTES
PT orders received and chart was reviewed. Pt is currently off the floor at dialysis. Will continue to follow and re-attempt evaluation as patients schedule allows.   Nancy Fernandez, PT

## 2017-05-04 NOTE — CDMP QUERY
Please clarify stage of   pressure ulcer noted in progress notes.        \" . Pressure sore on coccygeal area\"        Thank you,   Ming Austin RN, BSN, [address]

## 2017-05-04 NOTE — ROUTINE PROCESS
2015 Assumed care of patient from off going nurse. Patient resting in bed. No distress noted. Call bell within reach, siderails up x 3, bed in lowest position, and patient instructed to use call bell for assistance. Will continue to monitor. Primary Nurse Donald Blood RN and Jemma Solis RN performed a dual skin assessment on this patient Impairment noted- see wound doc flow sheet  Eddie score is 11. Will order speciality bed, dietician consult, and place pt on lift team. Prevalon boots applied. 2130 Family at bedside. Patient sitting up in bed eating dinner. 0400 No change in patient's status. Resting in bed.    0630 Patient refused am care this morning. [id number] Bedside and Verbal shift change report given to [address] [place] [address]  (oncoming nurse) by Viktoriya Rm RN (offgoing nurse). Report included the following information Kardex, Intake/Output, MAR and Recent Results.

## 2017-05-04 NOTE — PROGRESS NOTES
conducted an initial consultation and Spiritual Assessment for Amy Cruz, who is a 48 y.o.,female. Patients Primary Language is: [place]. According to the patients EMR Episcopal Affiliation is: [facility].     The reason the Patient came to the hospital is:   Patient Active Problem List    Diagnosis Date Noted    DM type 2 (diabetes mellitus, type 2) ([facility] 05/03/2017    UTI (urinary tract infection) 05/02/2017    Hyperglycemia 05/02/2017    Sepsis ([facility] 05/02/2017    Acute febrile illness 05/02/2017    ESRD on hemodialysis ([facility] 05/02/2017    Lupus (systemic lupus erythematosus) ([facility] 05/02/2017        The  provided the following Interventions:  Initiated a relationship of care and support. Explored issues of vazquez, belief, spirituality and Judaism/ritual needs while hospitalized. Provided information about Spiritual Care Services. Chart reviewed. The following outcomes where achieved:   confirmed Patient's Episcopal Affiliation. Assessment:  Patient indicated that she did not need pastoral services. Patient does not have any Judaism/cultural needs that will affect patients preferences in health care. Plan:  Chaplains will continue to follow and will provide pastoral care on an as needed/requested basis.  recommends bedside caregivers page  on duty if patient shows signs of acute spiritual or emotional distress.     [address]  ([phone])

## 2017-05-04 NOTE — PROGRESS NOTES
Bedside and Verbal shift change report given to Willodean Spurling, RN (oncoming nurse) by Emilio Gonzalez RN (offgoing nurse). Report included the following information SBAR, Kardex and MAR.

## 2017-05-04 NOTE — PROGRESS NOTES
Completed 2D Echocardiogram. Report to follow. Patient to be transported to dialysis.     Claudean Bickers, KAREN, RDCS

## 2017-05-04 NOTE — PROGRESS NOTES
Care Management Interventions  PCP Verified by CM: Yes (Bhbrenda)  Last Visit to PCP: 04/04/17  Palliative Care Consult (Criteria: CHF and RRAT>21): No (no order RRAT = 14)  Reason for No Palliative Care Consult: Other (see comment)  Mode of Transport at Discharge: BLS  Transition of Care Consult (CM Consult): Discharge Planning  MyChart Signup: No  Discharge Durable Medical Equipment: No (Pt uses manual wheelchair- DOES NOT [address]- her machine broke- never got insulin re-fills from PCP)  Physical Therapy Consult: Yes  Occupational Therapy Consult: Yes  Speech Therapy Consult: No  Current Support Network: Own Home (one floor apt on 1st floor- handicapped equipped  with mother who is primary support- sister & brither nearby also supportiive )  Confirm Follow Up Transport: Family (friend or family to drive home)  Plan discussed with Pt/Family/Caregiver: Yes  Discharge Location  Discharge Placement: Home with home health (contingent on PT eval-)     Pt stated has dialysis T-T-S at [facility] on [address] chair time is 10:30AM friend transports.

## 2017-05-04 NOTE — WOUND CARE
Physical Exam   211: Patient not seen due to being off unit at Dialysis/ Echo. Will attempt to see patient this afternoon for consult rounds.   Luci Alfaro RN

## 2017-05-04 NOTE — DIALYSIS
ACUTE HEMODIALYSIS FLOW SHEET    HEMODIALYSIS ORDERS: Physician:   Jacquelin Ruiziper:         Duration:   hr  BFR: DFR:    Dialysate:  Revaclear Temp  37 K+ 3   Ca+ 2.5  Na Bicarb   Weight: 67.1   kg    Bed Scale [x]     Unable to Obtain []        Dry weight/UF Goal: 2000 mL   Access   Needle Gauge 15 N/A   Heparin []  Bolus      Units    [] Hourly       Units    []None     Catheter locking solution Heparin   Pre BP: 135/77  Pulse:  69 Temperature: 99.5  Respirations: 36 Tx: NS   250    ml/Bolus  Other        [] N/A   Labs: Pre        Post:        [x] N/A   Additional Orders(medications, blood products, hypotension management):       [x] N/A     [x] Time Out/Safety Check  [x] [facility] Consent Verified     CATHETER ACCESS: [x]N/A   []Right   []Left   []IJ     []Fem   [] First use X-ray verified     []Tunnel                [] Non Tunneled   []No S/S infection  []Redness  []Drainage []Cultured []Swelling []Pain   []Medical Aseptic Prep Utilized   []Dressing Changed  [] Biopatch  Date:    []Clotted   []Patent   Flows: []Good  []Poor  []Reversed   If access problem,  notified: []Yes    [x]N/A  Date:           GRAFT/FISTULA ACCESS:  []N/A     []Right     [x]Left     [x]UE     []LE   []AVG   [x]AVF        []Buttonhole    [x]Medical Aseptic Prep Utilized   [x]No S/S infection  []Redness  []Drainage []Cultured []Swelling []Pain    Bruit:   [x] Strong    [] Weak       Thrill :   [x] Strong    [] Weak       Needle Gauge: 15   Length: 1    If access problem,  notified: []Yes     [x]N/A  Date:        Please describe access if present and not used:       GENERAL ASSESSMENT:    LUNGS:  Rate 17 SaO2%   [] N/A    [] Clear  [x] Coarse  [] Crackles  [] Wheezing        [] Diminished     Location : []RLL   []LLL    [x]RUL  [x]TELLY   Cough: []Productive  []Dry  []N/A   Respirations:  []Easy  [x]Labored   Therapy:  [x]RA  []NC  l/min    Mask: []NRB []Venti       O2%                  []Ventilator  []Intubated  [] Trach  [] BiPaP CARDIAC: []Regular      [] Irregular   [] Pericardial Rub  [] JVD        []  Monitored  [] Bedside  [x] Remotely monitored [] N/A  Rhythm:    EDEMA: [] None  [x]Generalized  [] Pitting [] 1    [] 2    [] 3    [] 4                 [x] Facial  [] Pedal  []  UE  [] LE   SKIN:   [x] Warm  [] Hot     [] Cold   [x] Dry     [] Pale   [] Diaphoretic                  [] Flushed  [] Jaundiced  [] Cyanotic  [] Rash  [] Weeping   LOC:    [x] Alert      [x]Oriented:    [x] Person     [] Place  []Time               [] Confused  [] Lethargic  [] Medicated  [] Non-responsive     GI / ABDOMEN:  [] Flat    [] Distended    [] Soft    [] Firm   [x]  Obese                             [] Diarrhea  [x] Bowel Sounds  [] Nausea  [] Vomiting       / URINE ASSESSMENT:[x] Voiding   [] Oliguria  [] Anuria   []  Mirza     [] Incontinent    []  Incontinent Brief      []  Bathroom Privileges     PAIN: [x] 0 []1  []2   []3   []4   []5   []6   []7   []8   []9   []10            Scale 0-10  Action/Follow Up:    MOBILITY:  [] Amb    [] Amb/Assist    [x] Bed    [] Wheelchair  [] Stretcher      All Vitals and Treatment Details on Attached [address]  [address]   [] 1st Time Acute  [] Stat  [] Routine  [] Urgent     [x] Acute Room  []  Bedside  [] ICU/CCU  [] ER   Isolation Precautions:  [x] Dialysis   [] Airborne   [] Contact    [] Reverse   Special Considerations:         [] Blood Consent Verified [x]N/A     ALLERGIES:    Code Status:  [x] Full Code  [] DNR  [] Other           HBsAg ONLY: Date Drawn    05/02/17      [x]Negative []Positive []Unknown   HBsAb: Date  05/02/177[] Susceptible   [x] [id number] []Not Drawn  [] Drawn     Current Labs:    Date of Labs: Today [x]     Results for Marissa De Jesus (MRN [id number]) as of 5/4/2017 12:18   Ref.  Range 5/4/2017 03:18   WBC Latest Ref Range: 4.6 - 13.2 K/uL 18.0 (H)   RBC Latest Ref Range: 4.20 - 5.30 M/uL 2.92 (L)   HGB Latest Ref Range: 12.0 - 16.0 g/dL 8.9 (L)   HCT Latest Ref Range: 35.0 - 45.0 % 27.1 (L)   MCV Latest Ref Range: 74.0 - 97.0 FL 92.8   MCH Latest Ref Range: 24.0 - 34.0 PG 30.5   MCHC Latest Ref Range: 31.0 - 37.0 g/dL 32.8   RDW Latest Ref Range: 11.6 - 14.5 % 13.4   PLATELET Latest Ref Range: 135 - 420 K/uL 124 (L)   MPV Latest Ref Range: 9.2 - 11.8 FL 12.1 (H)   NEUTROPHILS Latest Ref Range: 40 - 73 % 79 (H)   LYMPHOCYTES Latest Ref Range: 21 - 52 % 9 (L)   MONOCYTES Latest Ref Range: 3 - 10 % 7   EOSINOPHILS Latest Ref Range: 0 - 5 % 4   BASOPHILS Latest Ref Range: 0 - 2 % 1   DF Latest Units:   AUTOMATED   ABS. NEUTROPHILS Latest Ref Range: 1.8 - 8.0 K/UL 14.4 (H)   ABS. LYMPHOCYTES Latest Ref Range: 0.9 - 3.6 K/UL 1.6   ABS. MONOCYTES Latest Ref Range: 0.05 - 1.2 K/UL 1.2   ABS. EOSINOPHILS Latest Ref Range: 0.0 - 0.4 K/UL 0.8 (H)   ABS.  BASOPHILS Latest Ref Range: 0.0 - 0.1 K/UL 0.1                                                                                                                                 DIET:  [] Renal    [] Other     [] NPO     [x]  Diabetic      PRIMARY NURSE REPORT: First initial/Last name/Title      Pre Dialysis: Sangeetha Rizo RN   Time: 4772     EDUCATION:    [x] Patient [] Other         Knowledge Basis: []None []Minimal [x] Substantial   Barriers to learning  [x]N/A   [] Access Care     [] S&S of infection     [] Fluid Management     []K+     [x]Procedural    []Albumin     [] Medications     [] Tx Options     [] Transplant     [] Diet     [] Other   Teaching Tools:  [x] Explain  [] Demo  [] Handouts [] Video  Patient response:   [x] Verbalized understanding  [] Teach back  [] Return demonstration [] Requires follow up   Inappropriate due to            [address] Before each treatment:     Machine Number:                   [facility]                                   [] Unit Machine #  7                                  [] Portable Machine #1/RO serial # [id number] [] Portable Machine #2/RO serial # [id number]                                  [] Portable Machine #3/RO serial # [id number]                                                                                                       [address] [facility]                                  [] Portable Machine #11/RO serial # [id number]                                   [] Portable Machine #12/RO serial # [id number]                                  [] Portable Machine #13/RO serial #  [id number]      Alarm Test:  Pass time     0801  Other:         [x] RO/Machine Log Complete      Temp      [x]Extracorporeal Circuit Tested for integrity   Dialysate: pH  7.6 Conductivity: 14  Meter 14 HD Machine    14.5             TCD: 14  Dialyzer Lot # [id number]         Blood Tubing Lot #       [id number]  Saline Lot #  [id number]     CHLORINE TESTING-Before each treatment and every 4 hours    Total Chlorine: [x] less than 0.1 ppm  Time:  4 Hr/2nd Check Time:    (if greater than 0.1 ppm from Primary then every 30 minutes from Secondary)     TREATMENT INITIATION  with Dialysis Precautions:   [x] All Connections Secured                 [x] Saline Line Double Clamped   [x] Venous Parameters Set                  [x] Arterial Parameters Set    [x] Prime Given  250 ml                       [x]Air Foam Detector Engaged      Treatment Initiation Note:  HD treatment started in left AVF. Medication Dose Volume Route Initials Dialyzer Cleared: [x] Good [] Fair  [] Poor    Blood processed: 70. 0 L  UF Removed 2000 mL    Post Wt:  65.7  kg   POst BP:   115/62 Pulse:   94 Respirations: 32 Temperature: NaCl 0.9%      250 mL prim  250 mL rinse   500  mL    IV          Post Tx Vascular Access: AVF/AVG:   N/A  Held pressure art. 5; juan c. 5   Hectorol  2 mcg    0.5mL             Catheter: Locking solution: Heparin 1:1000 Art.  mL  Juan C.  mL                                   Post Assessment:                                    Skin:  [x] Warm  [x] Dry [] Diaphoretic    [] Flushed  [] Pale [] Cyanotic   [facility] Signatures Title Initials  Time Lungs: [] Clear    [x] Course  [] Crackles  [] Wheezing [] Diminished        Cardiac: [] Regular   [] Irregular   [x] Monitor  [] N/A  Rhythm:           Edema:  [] None    [x] General     [] Facial   [x] Pedal    [] UE    [] LE       Pain: [x]0  []1  []2   []3  []4   []5   []6   []7   []8   []9   []10         Post Treatment Note:  HD tx. Complete.   Patient tolerated well     POST TREATMENT PRIMARY NURSE HANDOFF REPORT:     First initial/Last name/Title         Post Dialysis: Time: Jadiel Paul RN @ 3173     Abbreviations: AVG-arterial venous graft, AVF-arterial venous fistula, IJ-Internal Jugular, Subcl-Subclavian, Fem-Femoral, Tx-treatment, AP/HR-apical heart rate, DFR-dialysate flow rate, BFR-blood flow rate, AP-arterial pressure, -venous pressure, UF-ultrafiltrate, TMP-transmembrane pressure, Juan-Venous, Art-Arterial, RO-Reverse Osmosis

## 2017-05-04 NOTE — PROGRESS NOTES
BPA/MST Referral    Malnutrition Screening Tool Referral received in error. Patient reports good appetite, eating well PTA and denies change in weight PTA. No nutrition intervention indicated at this time. Will re-screen as appropriate.      Alin Leavitt RD, [address]

## 2017-05-04 NOTE — DIABETES MGMT
Diabetes Patient/Family Education Record    Factors That  May Influence Patients Ability  to Learn or  Comply With  Recommendations:    []   Language barrier    []   Cultural needs   [x]   Motivation    []   Cognitive limitation    []   Physical   [x]   Education    []   Physiological factors   []   Hearing/vision/speaking impairment   []   Samaritan beliefs    []   Financial factors   []  Other:   []  No factors identified at this time. Person Instructed:   [x]   Patient   []   Family   []  Other     Preference for Learning:   [x]   Verbal   []   Written   []  Demonstration     Level of Comprehension & Competence:    []  Good                                      [] Fair                                     [x]  Poor                             [x]  Needs Reinforcement   [x]  Teachback completed    Education Component:   [x]  Medication management, including how to administer insulin (if appropriate) and potential medication interactions Pt reports not taking any diabetes medications at home, states she sees Dr. Delilah Soto for diabetes management.     [x]  Nutritional management including the role of carbohydrate intake   []  Exercise   []  Signs, symptoms, and treatment of hyperglycemia and hypoglycemia   [] Treatment of hyperglycemia and hypoglycemia   [x]  Importance of blood glucose monitoring and how to obtain a blood glucose meter Reports checking blood glucose daily    []  Instruction on use of blood glucose meter   [x]  Discuss the importance of HbA1C monitoring and provide patient with  Results 12.4% (estimated average glucose of 309 mg/dL)   []  Sick day guidelines   []  Proper use and disposal of lancets, needles, syringes or insulin pens (if appropriate)   [x]  Potential long-term complications (retinopathy, kidney disease, neuropathy, heart disease, stroke, vascular disease, foot care)   [] Provide emergency contact number and contact number for more information    [x]  Goal:  Patient/family will demonstrate understanding of Diabetes Self Management Skills by: 5/11/17  Plan for post-discharge education or self-management support:    [] Outpatient class schedule provided            [x] Patient Declined    [] Scheduled for outpatient classes (date) _______       Erlinda Sahu RD, CDE

## 2017-05-05 ENCOUNTER — APPOINTMENT (OUTPATIENT)
Dept: GENERAL RADIOLOGY | Age: 47
DRG: 720 | End: 2017-05-05
Attending: ORTHOPAEDIC SURGERY
Payer: MEDICAID

## 2017-05-05 LAB
ANION GAP BLD CALC-SCNC: 10 MMOL/L (ref 3–18)
BASOPHILS # BLD AUTO: 0 K/UL (ref 0–0.06)
BASOPHILS # BLD: 0 % (ref 0–3)
BUN SERPL-MCNC: 15 MG/DL (ref 7–18)
BUN/CREAT SERPL: 3 (ref 12–20)
CALCIUM SERPL-MCNC: 8.8 MG/DL (ref 8.5–10.1)
CHLORIDE SERPL-SCNC: 104 MMOL/L (ref 100–108)
CO2 SERPL-SCNC: 24 MMOL/L (ref 21–32)
CREAT SERPL-MCNC: 4.79 MG/DL (ref 0.6–1.3)
DIFFERENTIAL METHOD BLD: ABNORMAL
EOSINOPHIL # BLD: 0 K/UL (ref 0–0.4)
EOSINOPHIL NFR BLD: 0 % (ref 0–5)
ERYTHROCYTE [DISTWIDTH] IN BLOOD BY AUTOMATED COUNT: 13.4 % (ref 11.6–14.5)
GLUCOSE BLD STRIP.AUTO-MCNC: 139 MG/DL (ref 70–110)
GLUCOSE BLD STRIP.AUTO-MCNC: 181 MG/DL (ref 70–110)
GLUCOSE BLD STRIP.AUTO-MCNC: 208 MG/DL (ref 70–110)
GLUCOSE BLD STRIP.AUTO-MCNC: 88 MG/DL (ref 70–110)
GLUCOSE SERPL-MCNC: 140 MG/DL (ref 74–99)
HCT VFR BLD AUTO: 25.1 % (ref 35–45)
HGB BLD-MCNC: 8.1 G/DL (ref 12–16)
LYMPHOCYTES # BLD AUTO: 2 % (ref 20–51)
LYMPHOCYTES # BLD: 0.4 K/UL (ref 0.8–3.5)
MCH RBC QN AUTO: 30.5 PG (ref 24–34)
MCHC RBC AUTO-ENTMCNC: 32.3 G/DL (ref 31–37)
MCV RBC AUTO: 94.4 FL (ref 74–97)
MONOCYTES # BLD: 1.1 K/UL (ref 0–1)
MONOCYTES NFR BLD AUTO: 6 % (ref 2–9)
NEUTS BAND NFR BLD MANUAL: 5 % (ref 0–5)
NEUTS SEG # BLD: 16.3 K/UL (ref 1.8–8)
NEUTS SEG NFR BLD AUTO: 87 % (ref 42–75)
PLATELET # BLD AUTO: 140 K/UL (ref 135–420)
PLATELET COMMENTS,PCOM: ABNORMAL
PMV BLD AUTO: 11.6 FL (ref 9.2–11.8)
POTASSIUM SERPL-SCNC: 3.9 MMOL/L (ref 3.5–5.5)
RBC # BLD AUTO: 2.66 M/UL (ref 4.2–5.3)
RBC MORPH BLD: ABNORMAL
SODIUM SERPL-SCNC: 138 MMOL/L (ref 136–145)
WBC # BLD AUTO: 17.8 K/UL (ref 4.6–13.2)

## 2017-05-05 PROCEDURE — 74011636637 HC RX REV CODE- 636/637: Performed by: FAMILY MEDICINE

## 2017-05-05 PROCEDURE — 97166 OT EVAL MOD COMPLEX 45 MIN: CPT

## 2017-05-05 PROCEDURE — 80048 BASIC METABOLIC PNL TOTAL CA: CPT | Performed by: INTERNAL MEDICINE

## 2017-05-05 PROCEDURE — 74011636637 HC RX REV CODE- 636/637: Performed by: INTERNAL MEDICINE

## 2017-05-05 PROCEDURE — 97161 PT EVAL LOW COMPLEX 20 MIN: CPT

## 2017-05-05 PROCEDURE — 82962 GLUCOSE BLOOD TEST: CPT

## 2017-05-05 PROCEDURE — 74011250637 HC RX REV CODE- 250/637: Performed by: INTERNAL MEDICINE

## 2017-05-05 PROCEDURE — 74011250636 HC RX REV CODE- 250/636: Performed by: FAMILY MEDICINE

## 2017-05-05 PROCEDURE — 74011000258 HC RX REV CODE- 258: Performed by: INTERNAL MEDICINE

## 2017-05-05 PROCEDURE — 85025 COMPLETE CBC W/AUTO DIFF WBC: CPT | Performed by: INTERNAL MEDICINE

## 2017-05-05 PROCEDURE — 74011250636 HC RX REV CODE- 250/636: Performed by: INTERNAL MEDICINE

## 2017-05-05 PROCEDURE — 97530 THERAPEUTIC ACTIVITIES: CPT

## 2017-05-05 PROCEDURE — 73502 X-RAY EXAM HIP UNI 2-3 VIEWS: CPT

## 2017-05-05 PROCEDURE — 65660000000 HC RM CCU STEPDOWN

## 2017-05-05 PROCEDURE — 36415 COLL VENOUS BLD VENIPUNCTURE: CPT | Performed by: INTERNAL MEDICINE

## 2017-05-05 RX ORDER — GENTAMICIN SULFATE 60 MG/50ML
120 INJECTION, SOLUTION INTRAVENOUS ONCE
Status: COMPLETED | OUTPATIENT
Start: 2017-05-05 | End: 2017-05-05

## 2017-05-05 RX ADMIN — HEPARIN SODIUM 5000 UNITS: 5000 INJECTION, SOLUTION INTRAVENOUS; SUBCUTANEOUS at 17:50

## 2017-05-05 RX ADMIN — LACTOBACILLUS TAB 2 TABLET: TAB at 17:50

## 2017-05-05 RX ADMIN — LACTOBACILLUS TAB 2 TABLET: TAB at 08:15

## 2017-05-05 RX ADMIN — SEVELAMER CARBONATE 1600 MG: 800 TABLET, FILM COATED ORAL at 17:50

## 2017-05-05 RX ADMIN — MIDODRINE HYDROCHLORIDE 2.5 MG: 2.5 TABLET ORAL at 08:16

## 2017-05-05 RX ADMIN — SEVELAMER CARBONATE 1600 MG: 800 TABLET, FILM COATED ORAL at 12:24

## 2017-05-05 RX ADMIN — NEPHROCAP 1 CAPSULE: 1 CAP ORAL at 08:15

## 2017-05-05 RX ADMIN — INSULIN LISPRO 3 UNITS: 100 INJECTION, SOLUTION INTRAVENOUS; SUBCUTANEOUS at 08:17

## 2017-05-05 RX ADMIN — MIDODRINE HYDROCHLORIDE 2.5 MG: 2.5 TABLET ORAL at 17:50

## 2017-05-05 RX ADMIN — PREDNISONE 5 MG: 5 TABLET ORAL at 08:16

## 2017-05-05 RX ADMIN — CINACALCET HYDROCHLORIDE 30 MG: 30 TABLET, COATED ORAL at 10:20

## 2017-05-05 RX ADMIN — SEVELAMER CARBONATE 1600 MG: 800 TABLET, FILM COATED ORAL at 08:15

## 2017-05-05 RX ADMIN — GENTAMICIN SULFATE 120 MG: 60 INJECTION, SOLUTION INTRAVENOUS at 12:29

## 2017-05-05 RX ADMIN — HEPARIN SODIUM 5000 UNITS: 5000 INJECTION, SOLUTION INTRAVENOUS; SUBCUTANEOUS at 06:32

## 2017-05-05 RX ADMIN — INSULIN LISPRO 6 UNITS: 100 INJECTION, SOLUTION INTRAVENOUS; SUBCUTANEOUS at 17:50

## 2017-05-05 RX ADMIN — PIPERACILLIN SODIUM AND TAZOBACTAM SODIUM 2.25 G: 2; .25 INJECTION, POWDER, LYOPHILIZED, FOR SOLUTION INTRAVENOUS at 00:01

## 2017-05-05 RX ADMIN — ASPIRIN 81 MG: 81 TABLET, COATED ORAL at 08:16

## 2017-05-05 RX ADMIN — INSULIN GLARGINE 20 UNITS: 100 INJECTION, SOLUTION SUBCUTANEOUS at 08:16

## 2017-05-05 RX ADMIN — FLUTICASONE PROPIONATE 2 SPRAY: 50 SPRAY, METERED NASAL at 09:00

## 2017-05-05 NOTE — ROUTINE PROCESS
Bedside and Verbal shift change report given to Bridgett DOVE (oncoming nurse) by Karissa Blackburn (offgoing nurse). Report included the following information SBAR, Kardex, MAR and Recent Results.     SITUATION:    Code Status: Full Code  Reason for Admission: Sepsis ([facility]  Hyperglycemia  Acute febrile illness  UTI (urinary tract infection)   ESRD on hemodialysis ([facility]    [facility] day: 3   Problem List:       Hospital Problems  Date Reviewed: 5/3/2017          Codes Class Noted POA    DM type 2 (diabetes mellitus, type 2) ([facility] ICD-10-CM: E11.9  ICD-9-CM: 250.00  5/3/2017 Yes        UTI (urinary tract infection) ICD-10-CM: N39.0  ICD-9-CM: 599.0  5/2/2017 Unknown        Hyperglycemia ICD-10-CM: R73.9  ICD-9-CM: 790.29  5/2/2017 Unknown        [facility] ICD-10-CM: A41.9  ICD-9-CM: 038.9, 995.91  5/2/2017 Unknown        Acute febrile illness ICD-10-CM: R50.9  ICD-9-CM: 780.60  5/2/2017 Unknown        ESRD on hemodialysis ([facility] ICD-10-CM: N18.6, Z99.2  ICD-9-CM: 585.6, V45.11  5/2/2017 Unknown        Lupus (systemic lupus erythematosus) ([facility] ICD-10-CM: M32.9  ICD-9-CM: 710.0  5/2/2017 Unknown              BACKGROUND:    Past Medical History:   Past Medical History:   Diagnosis Date    CAD (coronary artery disease)     Diabetes ([facility]     ESRD (end stage renal disease) on dialysis ([facility]     Enrigue Roya, WEDNESDAY, [address] Hypertension     Lupus ([facility]     Stroke ([facility]          Patient taking anticoagulants yes     ASSESSMENT:    Changes in Assessment Throughout Shift: Stable     Patient has Central Line: no Reasons if yes:    Patient has Mirza Cath: no Reasons if yes:       Last Vitals:     Vitals:    05/04/17 1322 05/04/17 1521 05/04/17 2000 05/05/17 0009   BP: 115/62 152/90 156/73 158/69   Pulse: 94 (!) 105 64 72   Resp:  18 19 17   Temp:  97 °F (36.1 °C) 98.8 °F (37.1 °C) 99.6 °F (37.6 °C)   TempSrc:       SpO2:  97% 98% 94%   Weight:       Height:            IV and DRAINS (will only show if present)   Peripheral IV 05/02/17 Right Antecubital-Site Assessment: Clean, dry, & intact  Peripheral IV 05/03/17 Right Arm-Site Assessment: Clean, dry, & intact     WOUND (if present)   Wound Type:  Stage 2 pressure ulcer present on admission   Dressing present Dressing Present : No   Wound Concerns/Notes:  none     PAIN    Pain Assessment    Pain Intensity 1: 0 (05/05/17 0009)              Patient Stated Pain Goal: 0  o Interventions for Pain:  none  o Intervention effective:   o Time of last intervention  o Reassessment Completed: yes      Last 3 Weights:  Last 3 Recorded Weights in this Encounter    05/03/17 1147 05/03/17 2015 05/04/17 0515   Weight: 64.9 kg (143 lb) 64 kg (141 lb) 63.8 kg (140 lb 10.5 oz)     Weight change:      INTAKE/OUPUT    Current Shift:      Last three shifts: 05/03 0701 - 05/04 1900  In: 650 [P.O.:600; I.V.:50]  Out: 1988      LAB RESULTS     Recent Labs      05/04/17 0318 05/03/17 0520 05/02/17   1130   WBC  18.0*  16.6*  12.6   HGB  8.9*  10.4*  9.4*   HCT  27.1*  30.9*  28.2*   PLT  124*  137  105*        Recent Labs      05/04/17 0318 05/03/17 0520 05/02/17   1130   NA  135*  138  130*   K  3.6  3.8  4.2   GLU  187*  86  627*   BUN  42*  37*  97*   CREA  8.79*  7.40*  15.30*   CA  8.5  9.5  9.5  9.2   MG   --    --   2.0   INR   --    --   1.1       RECOMMENDATIONS AND DISCHARGE PLANNING     1. Pending tests/procedures/ Plan of Care or Other Needs: MRI     2. Discharge plan for patient and Needs/Barriers: self care     3. Estimated Discharge Date: 05/06/17 Posted on Whiteboard in [address] [place] [address] Room: yes      4. The patient's care plan was reviewed with the oncoming nurse.        \"HEALS\" SAFETY CHECK      Fall Risk    Total Score: 2    Safety Measures: Safety Measures: Bed/Chair-Wheels locked, Bed in low position, Call light within reach, Fall prevention (comment), Side rails X2    A safety check occurred in the patient's room between off going nurse and oncoming nurse listed above. The safety check included the below items  Area Items   H  High Alert Medications - Verify all high alert medication drips (heparin, PCA, etc.)   E  Equipment - Suction is set up for ALL patients (with wyatt)  - Red plugs utilized for all equipment (IV pumps, etc.)  - WOWs wiped down at end of shift.  - Room stocked with oxygen, suction, and other unit-specific supplies   A  Alarms - Bed alarm is set for fall risk patients  - Ensure chair alarm is in place and activated if patient is up in a chair   L  Lines - Check IV for any infiltration  - Mirza bag is empty if patient has a Mirza   - Tubing and IV bags are labeled   S  Safety   - Room is clean, patient is clean, and equipment is clean. - Hallways are clear from equipment besides carts. - Fall bracelet on for fall risk patients  - Ensure room is clear and free of clutter  - Suction is set up for ALL patients (with wyatt)  - Hallways are clear from equipment besides carts.    - Isolation precautions followed, supplies available outside room, sign posted     Sven Guevara

## 2017-05-05 NOTE — PROGRESS NOTES
Progress Note    Rodney Kelly  48 y.o. Admit Date: 5/2/2017  Active Problems:    UTI (urinary tract infection) (5/2/2017) POA: Unknown      Hyperglycemia (5/2/2017) POA: Unknown      Sepsis ([facility]) (5/2/2017) POA: Unknown      Acute febrile illness (5/2/2017) POA: Unknown      ESRD on hemodialysis ([facility]) (5/2/2017) POA: Unknown      Lupus (systemic lupus erythematosus) ([facility]) (5/2/2017) POA: Unknown      DM type 2 (diabetes mellitus, type 2) ([facility]) (5/3/2017) POA: Yes            Subjective:     Patient does not feel good, no fever, no Cough. . No SOB. A comprehensive review of systems was negative except for that written in the History of Present Illness.     Objective:     Visit Vitals    /77 (BP 1 Location: Right leg, BP Patient Position: At rest)    Pulse 83    Temp 97.6 °F (36.4 °C)    Resp 18    Ht 5' 9\" (1.753 m)    Wt 64.4 kg (142 lb)    SpO2 95%    Breastfeeding No    BMI 20.97 kg/m2         Intake/Output Summary (Last 24 hours) at 05/05/17 1437  Last data filed at 05/05/17 0543   Gross per 24 hour   Intake              530 ml   Output                0 ml   Net              530 ml       Current Facility-Administered Medications   Medication Dose Route Frequency Provider Last Rate Last Dose    GENTAMICIN INFORMATION NOTE   Other PRN Andreina Cruz MD       [facility] [START ON 5/6/2017] Gentamicin random level  1 Each Other Rx Dosing/Monitoring Andreina Cruz MD        insulin glargine (LANTUS) injection 20 Units  20 Units SubCUTAneous DAILY Mary Ann Martin MD   20 Units at 05/05/17 0816    [START ON 5/6/2017] epoetin dwaine (EPOGEN;PROCRIT) injection 10,000 Units  10,000 Units IntraVENous DIALYSIS TUE, THU & SAT Tera Villareal MD        predniSONE (DELTASONE) tablet 5 mg  5 mg Oral DAILY WITH BREAKFAST Tari Almanza MD   5 mg at 05/05/17 0816    insulin lispro (HUMALOG) injection   SubCUTAneous AC&HS Mary Ann Martin MD   Stopped at 05/05/17 1130    midodrine (PROAMITINE) tablet 2.5 mg  2.5 mg Oral BID WITH MEALS Brii Shannon MD   2.5 mg at 05/05/17 0816    Lactobacillus Acidoph & Bulgar [facility]) tablet 2 Tab  2 Tab Oral BID Brii Shannon MD   2 Tab at 05/05/17 0815    aspirin delayed-release tablet 81 mg  81 mg Oral DAILY Brii Shannon MD   81 mg at 05/05/17 0816    pravastatin (PRAVACHOL) tablet 20 mg  20 mg Oral QHS Brii Shannon MD   20 mg at 05/04/17 2102    fluticasone (FLONASE) 50 mcg/actuation nasal spray 2 Spray  2 Spray Both Nostrils DAILY Brii Shannon MD   2 Spray at 05/05/17 0900    sodium chloride (NS) flush 5-10 mL  5-10 mL IntraVENous PRN Dilma Lauren MD        doxercalciferol (HECTOROL) 4 mcg/2 mL injection 1 mcg  1 mcg IntraVENous DIALYSIS ONCE Kayode Knight MD   1 mcg at 05/04/17 1020    glucose chewable tablet 16 g  4 Tab Oral PRN Dilma Lauren MD        glucagon [facility]") injection 1 mg  1 mg IntraMUSCular PRN Dilma Lauren MD        dextrose (D50W) injection syrg 12.5-25 g  25-50 mL IntraVENous PRN Dilma Lauren MD        acetaminophen (TYLENOL) tablet 650 mg  650 mg Oral Q4H PRN Walter Eng MD   650 mg at 05/04/17 1055    diphenhydrAMINE (BENADRYL) capsule 25 mg  25 mg Oral Q4H PRN Walter Eng MD        ondansetron [facility]) injection 4 mg  4 mg IntraVENous Q4H PRN Walter Eng MD   4 mg at 05/03/17 1318    bisacodyl (DULCOLAX) tablet 10 mg  10 mg Oral DAILY PRN Walter Eng MD        heparin (porcine) injection 5,000 Units  5,000 Units SubCUTAneous Q12H Walter Eng MD   5,000 Units at 05/05/17 2267    cinacalcet (SENSIPAR) tablet 30 mg  30 mg Oral DAILY WITH BREAKFAST Kayode Knight MD   30 mg at 05/05/17 1020    sevelamer carbonate (RENVELA) tab 1,600 mg  1,600 mg Oral TID WITH MEALS Kayode Knight MD   1,600 mg at 05/05/17 1224    B complex-vitaminC-folic acid (NEPHROCAP) cap  1 Cap Oral DAILY Kayode Knight MD   1 Cap at 05/05/17 0815        Physical Exam:     Physical Exam:   General:  Alert, cooperative, no distress, appears stated age. Neck: Supple, symmetrical, trachea midline, no adenopathy, thyroid: no enlargement/tenderness/nodules, no carotid bruit and no JVD. Lungs:   Clear to auscultation bilaterally. Heart:  Regular rate and rhythm, S1, S2 normal, no murmur, click, rub or gallop. Abdomen:   Soft, non-tender. Bowel sounds normal. No masses,  No organomegaly. Extremities: Extremities normal, atraumatic, no cyanosis or edema, AVFvhas good thrill & bruit. Data Review:    CBC w/Diff    Recent Labs      05/05/17 0312 05/04/17 0318 05/03/17   0520   WBC  17.8*  18.0*  16.6*   RBC  2.66*  2.92*  3.35*   HGB  8.1*  8.9*  10.4*   HCT  25.1*  27.1*  30.9*   MCV  94.4  92.8  92.2   MCH  30.5  30.5  31.0   MCHC  32.3  32.8  33.7   RDW  13.4  13.4  13.3    Recent Labs      05/05/17 0312 05/04/17 0318  05/03/17   0520   BANDS  5   --    --    MONOS  6  7  2*   EOS  0  4  3   BASOS  0  1  0   RDW  13.4  13.4  13.3        Comprehensive Metabolic Profile    Recent Labs      05/05/17 0312 05/04/17 0318  05/03/17   0520   NA  138  135*  138   K  3.9  3.6  3.8   CL  104  99*  102   CO2  24  21  22   BUN  15  42*  37*   CREA  4.79*  8.79*  7.40*    Recent Labs      05/05/17 0312 05/04/17 0318 05/03/17   0520   CA  8.8  8.5  9.5  9.5   PHOS   --   3.9  4.0                        Impression:       Active Hospital Problems    Diagnosis Date Noted    DM type 2 (diabetes mellitus, type 2) ([facility] 05/03/2017    UTI (urinary tract infection) 05/02/2017    Hyperglycemia 05/02/2017    Sepsis ([facility] 05/02/2017    Acute febrile illness 05/02/2017    ESRD on hemodialysis ([facility] 05/02/2017    Lupus (systemic lupus erythematosus) ([facility] 05/02/2017      Still has high  WBC count. Plan:     Continue IV Antibiotics for Gm negative Bacteremia, Dialysis tomorrow.       Jorge Bull MD

## 2017-05-05 NOTE — DIABETES MGMT
Diabetes Patient/Family Education Record    Factors That  May Influence Patients Ability  to Learn or  Comply With  Recommendations:    []   Language barrier    []   Cultural needs   []   Motivation    []   Cognitive limitation    []   Physical   [x]   Education    []   Physiological factors   []   Hearing/vision/speaking impairment   []   Mormon beliefs    []   Financial factors   []  Other:   []  No factors identified at this time. Person Instructed:   [x]   Patient   []   Family   []  Other     Preference for Learning:   [x]   Verbal   [x]   Written   []  Demonstration     Level of Comprehension & Competence:    [x]  Good                                      [] Fair                                     []  Poor                             []  Needs Reinforcement   [x]  Teachback completed    Education Component: This is f/u education for BG monitoring and insulin instruction. Please initial education entered on 05/04/2017. [x]  Medication management, including how to administer insulin (if appropriate) and potential medication interactions: Completed insulin instruction. Satisfactory return demonstration. [x]  Nutritional management including the role of carbohydrate intake:  Emphasized the importance of eating 3 meals daily, not skipping meals. Patient verbalized understanding.   []  Exercise   [x]  Signs, symptoms, and treatment of hyperglycemia and hypoglycemia   [x] Treatment of hyperglycemia and hypoglycemia   [x]  Importance of blood glucose monitoring and how to obtain a blood glucose meter    [x]  Instruction on use of blood glucose meter: Satisfactory return demonstration.    []  Discuss the importance of HbA1C monitoring and provide patient with  results   []  Sick day guidelines   [x]  Proper use and disposal of lancets, needles, syringes or insulin pens (if appropriate)   [x]  Potential long-term complications (retinopathy, kidney disease, neuropathy, heart disease, stroke, vascular disease, foot care)   [x] Provide emergency contact number and contact number for more information    [x]  Goal:  Patient/family will demonstrate understanding of Diabetes Self Management Skills by: 05/12/2017  Plan for post-discharge education or self-management support:    [x] Outpatient class schedule provided: Patient stated that she will try to attend classes.             [] Patient Declined    [] Scheduled for outpatient classes (date) _______         Josep De Jesus RN

## 2017-05-05 NOTE — PROGRESS NOTES
Problem: Mobility Impaired (Adult and Pediatric)  Goal: *Acute Goals and Plan of Care (Insert Text)  Physical Therapy Goals  Initiated 5/5/2017 and to be accomplished within 7 day(s)  1. Patient will move from supine to sit and sit to supine in bed with supervision/set-up. 2. Patient will transfer from bed to chair and chair to bed with minimal assistance/contact guard assist using the least restrictive device. 3. Patient will perform sit to stand with minimal assistance/contact guard assist.  4. Patient will roll bilaterally modified independently for repositioning. 5. Patient will ambulate 5 with moderate assist and least restrictive AD. Outcome: Progressing Towards Goal  PHYSICAL THERAPY EVALUATION     Patient: Blu Vaughn (10 y.o. female)  Date: 5/5/2017  Primary Diagnosis: Sepsis ([address]  Hyperglycemia  Acute febrile illness  UTI (urinary tract infection)  ESRD on hemodialysis [facility])        Precautions:   Fall      ASSESSMENT :  Based on the objective data described below, the patient presents with significant impairments to strength, balance, and activity tolerance. These impairments limit independence with functional mobility, and transfers. Pt with bilateral gastroc disuse atrophy noted. Pt states PLOF was independent to transfer hospital bed <->w/c and to ambulate from bathroom threshold to/from toilet hand holding to furniture. Currently pt required moderate assist for supine to sit. Mod assistx2 to sit to stand. Max assist x2 to side step 3' towards [facility]. Min assist for stand->sit->supine. Pt with additional c/o nausea initially at rest, however this improves once seated at EOB. Patient will benefit from skilled intervention to address the above impairments.   Patients rehabilitation potential is considered to be Good  Factors which may influence rehabilitation potential include:   [ ]         None noted  [ ]         Mental ability/status  [ ]         Medical condition  [X]         Home/family situation and support systems  [ ]         Safety awareness  [ ]         Pain tolerance/management  [ ]         Other:        PLAN :  Recommendations and Planned Interventions:  [X]           Bed Mobility Training             [X]    Neuromuscular Re-Education  [X]           Transfer Training                   [ ]    Orthotic/Prosthetic Training  [X]           Gait Training                          [ ]    Modalities  [X]           Therapeutic Exercises          [ ]    Edema Management/Control  [X]           Therapeutic Activities            [X]    Patient and Family Training/Education  [ ]           Other (comment):     Frequency/Duration: Patient will be followed by physical therapy 1-2 times per day/4-7 days per week to address goals. Discharge Recommendations: Talat Lopez  Further Equipment Recommendations for Discharge: to be determined       G-CODES       Mobility  Current  CL= 60-79%   Goal  CJ= 20-39%. The severity rating is based on the Other : Level of Assistance required for Functional Mobility and ADLs. Eval Complexity: History: HIGH Complexity :3+ comorbidities / personal factors will impact the outcome/ POC Exam:HIGH Complexity : 4+ Standardized tests and measures addressing body structure, function, activity limitation and / or participation in recreation  Presentation: LOW Complexity : Stable, uncomplicated  Clinical Decision Making:Other outcome measures Level of Assistance required for Functional Mobility and ADLs. HIGH Overall Complexity:LOW          SUBJECTIVE:   Patient stated I've been feeling nauseous all day but nothing's coming up.       OBJECTIVE DATA SUMMARY:       Past Medical History:   Diagnosis Date    CAD (coronary artery disease)      Diabetes ([facility] [address]      ESRD (end stage renal disease) on dialysis ([facility] [address]       MONDAY, WEDNESDAY, FRIDAY    Hypertension      Lupus ([facility] [address]      Stroke [facility])       Past Surgical History:   Procedure Laterality Date    HX  SECTION        HX VASCULAR ACCESS         Prior Level of Function/Home Situation: independent transfer bed<->manual w/c. Independent w/c mobility. Independent ambulation bathroom door <->toilet hand holding to furniture  [address]: Apartment  # Steps to Enter: 0  One/Two Story Residence: One story  Living Alone: No  Support Systems: Parent  Patient Expects to be Discharged to[de-identified] Apartment  Current DME Used/Available at Home: Hospital bed, Wheelchair  Critical Behavior:  Neurologic State: Alert  Orientation Level: Oriented X4  Cognition: Appropriate decision making; Follows commands  Safety/Judgement: Awareness of environment; Fall prevention  Psychosocial  Patient Behaviors: Calm; Cooperative  Purposeful Interaction: Yes  Pt Identified Daily Priority: Clinical issues (comment)  Car[employer] Process: Nurture loving kindness;Create healing environment; Attend basic human needs; Supportive expression  Caring Interventions: Reassure; Therapeutic modalities  Wound Sacral/coccyx Posterior-Periwound Skin Condition: Rash  Strength:    Strength: Grossly decreased, non-functional (BLE's grossly ~3-/5)  Tone & Sensation:   Tone: Normal (BLE's)  Sensation: Intact (BLE's)  Range Of Motion:  AROM: Generally decreased, functional  PROM: Generally decreased, functional  Functional Mobility:  Bed Mobility:  Supine to Sit: Moderate assistance; Additional time  Sit to Supine: Minimum assistance; Additional time  Transfers:  Sit to Stand: Moderate assistance;Assist x2  Stand to Sit: Minimum assistance;Assist x2  Balance:   Sitting: Impaired  Sitting - Static: Fair (occasional)  Sitting - Dynamic: Fair (occasional)  Standing: Impaired; With support  Standing - Static: Poor  Standing - Dynamic : Poor  Ambulation/Gait Training:  Distance (ft): 3 Feet (ft)  Ambulation - Level of Assistance: Maximum assistance;Assist x2; Additional time  Gait Description (WDL): Exceptions to WDL  Gait Abnormalities: Decreased step clearance  Base of Support: Center of gravity altered  Speed/Abby: Slow;Shuffled  Step Length: Left shortened;Right shortened  Therapeutic Exercises:   n/a  Pain:  Pain Scale 1: Numeric (0 - 10)  Pain Intensity 1: 0  Activity Tolerance:   fair  Please refer to the flowsheet for vital signs taken during this treatment. After treatment:   [ ]         Patient left in no apparent distress sitting up in chair  [X]         Patient left in no apparent distress in bed  [X]         Call bell left within reach  [ ]         Nursing notified  [ ]         Caregiver present  [ ]         Bed alarm activated      COMMUNICATION/EDUCATION:   [X]         Fall prevention education was provided and the patient/caregiver indicated understanding. [X]         Patient/family have participated as able in goal setting and plan of care. [X]         Patient/family agree to work toward stated goals and plan of care. [ ]         Patient understands intent and goals of therapy, but is neutral about his/her participation. [ ]         Patient is unable to participate in goal setting and plan of care. Thank you for this referral.  Maksim Kay, PT   Time Calculation: 23 mins

## 2017-05-05 NOTE — PROGRESS NOTES
OT orders received and chart reviewed. Patient pending pelvic MRI. Will hold and follow up when medically stable and clarification of activity from orthopedic MD. Thank you.     Christian Pacheco OTR/SUSAN

## 2017-05-05 NOTE — PROGRESS NOTES
Problem: Self Care Deficits Care Plan (Adult)  Goal: *Acute Goals and Plan of Care (Insert Text)  Occupational Therapy Goals  Initiated 5/5/2017 within 7 day(s). 1. Patient will perform grooming with supervision/set-up   2. Patient will perform upper body dressing with supervision/set-up. 3. Patient will perform lower body dressing with minimal assistance/contact guard assist.  4. Patient will perform toilet transfers with minimal assistance/contact guard assist.  5. Patient will perform all aspects of toileting with minimal assistance/contact guard assist.  6. Patient will participate in upper extremity therapeutic exercise/activities with supervision/set-up in preparation for self care tasks  Outcome: Progressing Towards Goal  OCCUPATIONAL THERAPY EVALUATION     Patient: Anupama Villa (48 y.o. female)  Date: 5/5/2017  Primary Diagnosis: Sepsis ([facility] [address]  Hyperglycemia  Acute febrile illness  UTI (urinary tract infection)  ESRD on hemodialysis ([facility] [address]        Precautions:  Fall, WBAT BLE's per orthopedics      ASSESSMENT :  Based on the objective data described below, the patient needed mod A during bed mobility with additional time. Patient became nauseous sitting on EOB, but decreased with additional time. Patient has WFL BUE AROM and decreased but functional  strength. Patient needed mod/max A x2 to simulate BSC transfer. Patient reported that she is definitely weaker then she was previously at home. Patient assisted back to bed with min A x2 and left comfortable in no distress. Patient will benefit from skilled intervention to address the above impairments.   Patients rehabilitation potential is considered to be Good  Factors which may influence rehabilitation potential include:   [ ]             None noted  [ ]             Mental ability/status  [X]             Medical condition  [ ]             Home/family situation and support systems  [ ]             Safety awareness  [ ]             Pain tolerance/management  [ ]             Other:        PLAN :  Recommendations and Planned Interventions:  [X]               Self Care Training                  [X]        Therapeutic Activities  [X]               Functional Mobility Training    [ ]        Cognitive Retraining  [X]               Therapeutic Exercises           [X]        Endurance Activities  [X]               Balance Training                   [ ]        Neuromuscular Re-Education  [ ]               Visual/Perceptual Training     [X]   Home Safety Training  [X]               Patient Education                 [ ]        Family Training/Education  [ ]               Other (comment):     Frequency/Duration: Patient will be followed by occupational therapy 1-2 times per day/4-7 days per week to address goals. Discharge Recommendations: Talat Lopez  Further Equipment Recommendations for Discharge: TBD       PATIENT COMPLEXITY      Eval Complexity: History: LOW Complexity : Brief history review ; Examination: MEDIUM Complexity : 3-5 performance deficits relating to physical, cognitive , or psychosocial skils that result in activity limitations and / or participation restrictions; Decision Making:MEDIUM Complexity : Patient may present with comorbidities that affect occupational performnce. Miniml to moderate modification of tasks or assistance (eg, physical or verbal ) with assesment(s) is necessary to enable patient to complete evaluation  Assessment: Low Complexity       G-CODES:      Self Care  Current  CK= 40-59%   Goal  CI= 1-19%. The severity rating is based on the Level of Assistance required for Functional Mobility and ADLs. SUBJECTIVE:   Patient stated Cris Isbell I am definitely weaker.       OBJECTIVE DATA SUMMARY:       Past Medical History:   Diagnosis Date    CAD (coronary artery disease)      Diabetes ([facility] [address]      ESRD (end stage renal disease) on dialysis ([facility] [address]       MONDAY, [address], [facility]    Hypertension      Lupus ([facility] [address]      Stroke [facility])       Past Surgical History:   Procedure Laterality Date    HX  SECTION        HX VASCULAR ACCESS         Prior Level of Function/Home Situation: Patient reported she was modified independent in basic self care tasks and functional mobility PTA, primarily w/c bound with use of walker during toilet transfer   [address]: Apartment  # Steps to Enter: 0  One/Two Story Residence: One story  Living Alone: No  Support Systems: Parent  Patient Expects to be Discharged to[de-identified] Apartment  Current DME Used/Available at Home: Hospital bed, Wheelchair  [X]  Right hand dominant          [ ]  Left hand dominant  Cognitive/Behavioral Status:  Neurologic State: Alert  Orientation Level: Oriented X4  Cognition: Appropriate decision making; Follows commands  Safety/Judgement: Awareness of environment; Fall prevention     Skin: No skin changes noted     Edema: 1+ edema noted on L elbow around IV site; educated patient to elevate on pillow     Vision/Perceptual:       Acuity: Within Defined Limits       Coordination:  Coordination: Within functional limits (BUEs)       Balance:  Sitting: Impaired; With support  Sitting - Static: Fair (occasional)  Sitting - Dynamic: Fair (occasional)  Standing: Impaired; With support  Standing - Static: Poor  Standing - Dynamic : Poor     Strength:  Strength: Generally decreased, functional ( strength)     Tone & Sensation:  Tone: Normal (BUEs)  Sensation: Intact (BUEs)     Range of Motion:  AROM: Within functional limits (BUEs)     Functional Mobility and Transfers for ADLs:  Bed Mobility:  Supine to Sit: Moderate assistance  Sit to Supine: Minimum assistance  Scooting:  Moderate assistance  Transfers:  Sit to Stand: Maximum assistance;Assist x2              Toilet Transfer : Maximum assistance;Assist x2 (simulated BSC transfer with HHA)                 ADL Assessment:(clinical judgment based on functional mobility)  Feeding: Supervision Oral Facial Hygiene/Grooming: Moderate assistance     Bathing: Maximum assistance     Upper Body Dressing: Moderate assistance     Lower Body Dressing: Maximum assistance     Toileting: Maximum assistance     Pain:  Pt reports 0/10 pain or discomfort prior to treatment. Pt reports 0/10 pain or discomfort post treatment. Activity Tolerance:   Fair     Please refer to the flowsheet for vital signs taken during this treatment. After treatment:   [ ] Patient left in no apparent distress sitting up in chair  [X] Patient left in no apparent distress in bed  [X] Call bell left within reach  [X] Nursing notified  [ ] Caregiver present  [ ] Bed alarm activated      COMMUNICATION/EDUCATION:   [ ] Home safety education was provided and the patient/caregiver indicated understanding. [X] Patient/family have participated as able in goal setting and plan of care. [X] Patient/family agree to work toward stated goals and plan of care. [ ] Patient understands intent and goals of therapy, but is neutral about his/her participation. [ ] Patient is unable to participate in goal setting and plan of care.      Thank you for this referral.  Roxanne Sellers OTR/L  Time Calculation: 17 mins

## 2017-05-05 NOTE — PROGRESS NOTES
Avila Sarabia , diabetic educator , informed of pt's need of working glucometer; she was aware of this need after meeting  pt in dialysis yesterday ;she will offer her a generic meter during her evaluation later this date.

## 2017-05-05 NOTE — ROUTINE PROCESS
0012  Assumed care of patient after returning from MRI. Patient back in room and resting in bed. No distress noted. Call bell within reach, siderails up x 3, bed in lowest position, and patient instructed to use call bell for assistance. Will continue to monitor. [id number] Bedside and Verbal shift change report given to Marlon Stout RN (oncoming nurse) by Ene Chadwick RN (offgoing nurse). Report included the following information Kardex, Intake/Output, MAR and Recent Results.

## 2017-05-05 NOTE — WOUND CARE
Physical Exam   Musculoskeletal:        Legs:    Wound Sacral/coccyx Posterior (Active), DTI, HAPI   DRESSING TYPE Open to air 5/5/2017  1:17 PM   Pressure Injury DTI 5/5/2017  1:17 PM   Wound Length (cm) 3 cm 5/5/2017  1:17 PM   Wound Width (cm) 6 cm 5/5/2017  1:17 PM   Epithelialization (%) 100 5/5/2017  1:17 PM   Tissue Type Percent Maroon/Purple 100 % 5/5/2017  1:17 PM   Drainage Amount  None 5/5/2017  1:17 PM   Wound Odor None 5/5/2017  1:17 PM   Periwound Skin Condition Rash 5/5/2017  1:17 PM   Dressing Type Applied Silicone 2/0/0838  4:00 PM   Procedure Tolerated Well 5/5/2017  1:17 PM   Number of days:2          211: Patient seen by wound care for consult rounding. DTI noted to inner buttocks, painful to touch. Mepilex border applied and patient instructed to position from side to side. Supine for meals or up out of bed only. Nursing notified of plan of care. Care turned over to nursing.

## 2017-05-05 NOTE — PROGRESS NOTES
SUBJECTIVE:    Feeling better. No chest or abdominal pain. No cough. No N/V/D. Lives with mother. Wheelchair bound for 4 years. OBJECTIVE:    Visit Vitals    /77 (BP 1 Location: Right leg, BP Patient Position: At rest)    Pulse 83    Temp 97.6 °F (36.4 °C)    Resp 18    Ht 5' 9\" (1.753 m)    Wt 64.4 kg (142 lb)    SpO2 95%    Breastfeeding No    BMI 20.97 kg/m2     CVS: RRR  RS: CTA bilaterally but diminished in bases, poor efforts, no wheezes  CVS: RRR  GI: NT, ND, BS +  Skin: stage one on coccygeal area otherwise hyperpigmented skin in buttock and lower back area  CNS: moves both UE well. Extremities: no pedal edema. ASSESSMENT:    1. Acute metabolic encephalopathy due to sepsis, improved  2. E.coli UTI with sepsis  3. Fluid overload due to missed HD, resolved   4. Fever with immunocompromised status, improved  5. Hyponatremia due to fluid overload, resolved  6. Uncontrolled DM 2, A1c 12. 3. Off insulin drip. 7. Loose BMs 2 times a day  8. ESRD  9. Anemia of ESRD  10. Chronic right basal ganglia and left thalamic old lacunar infarct. 11.Bilateral right greater than left mastoid air cells effusion/suggesting mastoiditis and middle ears effusion. 12. HTN  13. Lupus  14. Hypoxia due to fluid overload and Bibasilar atelectasis, resolved. 15. Leukocytosis  16. Pressure sore on coccygeal area  17. Hypertriglyceridemia   18. Right hepatic lobe lesion on CT  19. Low-attenuation in the upper pole the left kidney most likely a cyst.  20. Lytic lesion of the right iliac bone measuring 3.6 x 1.4 cm. In the setting of renal osteodystrophy could be a brown tumor  21. Decub wounds  @ left  inner buttocks and right  inner buttocks, POA. PLAN:    Cont current antibiotic per ID  repeat blood c/s pending  Ortho input noted  Cont HD per nephrology  Possible discharge next week if remains stable.   Will need SNF     --> total time greater than 30 minutes    CMP:   Lab Results   Component Value Date/Time     05/05/2017 03:12 AM    K 3.9 05/05/2017 03:12 AM     05/05/2017 03:12 AM    CO2 24 05/05/2017 03:12 AM    AGAP 10 05/05/2017 03:12 AM     (H) 05/05/2017 03:12 AM    BUN 15 05/05/2017 03:12 AM    CREA 4.79 (H) 05/05/2017 03:12 AM    GFRAA 12 (L) 05/05/2017 03:12 AM    GFRNA 10 (L) 05/05/2017 03:12 AM    CA 8.8 05/05/2017 03:12 AM     CBC:   Lab Results   Component Value Date/Time    WBC 17.8 (H) 05/05/2017 03:12 AM    HGB 8.1 (L) 05/05/2017 03:12 AM    HCT 25.1 (L) 05/05/2017 03:12 AM     05/05/2017 03:12 AM     Recent Glucose Results:   Lab Results   Component Value Date/Time     (H) 05/05/2017 03:12 AM

## 2017-05-05 NOTE — ROUTINE PROCESS
Bedside and Verbal shift change report given to Chanelle MIDDLETON(oncoming nurse) by Nilo Daniel (offgoing nurse). Report included the following information SBAR, Kardex, MAR and Recent Results.     SITUATION:    Code Status: Full Code   Reason for Admission: Sepsis ([facility]   Hyperglycemia   Acute febrile illness   UTI (urinary tract infection)   ESRD on hemodialysis ([facility]    [facility] day: 2   Problem List:       Hospital Problems  Date Reviewed: 5/3/2017          Codes Class Noted POA    DM type 2 (diabetes mellitus, type 2) ([facility] ICD-10-CM: E11.9  ICD-9-CM: 250.00  5/3/2017 Yes        UTI (urinary tract infection) ICD-10-CM: N39.0  ICD-9-CM: 599.0  5/2/2017 Unknown        Hyperglycemia ICD-10-CM: R73.9  ICD-9-CM: 790.29  5/2/2017 Unknown        Sepsis ([facility] ICD-10-CM: A41.9  ICD-9-CM: 038.9, 995.91  5/2/2017 Unknown        Acute febrile illness ICD-10-CM: R50.9  ICD-9-CM: 780.60  5/2/2017 Unknown        ESRD on hemodialysis (HCC) ICD-10-CM: N18.6, Z99.2  ICD-9-CM: 585.6, V45.11  5/2/2017 Unknown        Lupus (systemic lupus erythematosus) ([facility] ICD-10-CM: M32.9  ICD-9-CM: 710.0  5/2/2017 Unknown              BACKGROUND:    Past Medical History:   Past Medical History:   Diagnosis Date    CAD (coronary artery disease)     Diabetes ([facility]     ESRD (end stage renal disease) on dialysis ([facility]     MONDAY, WEDNESDAY, FRIDAY    Hypertension     Lupus ([facility]     Stroke ([facility]          Patient taking anticoagulants yes     ASSESSMENT:    Changes in Assessment Throughout Shift: Stable     Patient has Central Line: no Reasons if yes:    Patient has Mirza Cath: no Reasons if yes:       Last Vitals:     Vitals:    05/04/17 1230 05/04/17 1300 05/04/17 1322 05/04/17 1521   BP: 105/58 108/63 115/62 152/90   Pulse: 93 94 94 (!) 105   Resp:    18   Temp:    97 °F (36.1 °C)   TempSrc:       SpO2:    97%   Weight:       Height:            IV and DRAINS (will only show if present)   Peripheral IV 05/02/17 Right Antecubital-Site Assessment: Clean, dry, & intact  Peripheral IV 05/03/17 Right Arm-Site Assessment: Clean, dry, & intact     WOUND (if present)   Wound Type:  Stage 2 pressure ulcer present on admission   Dressing present Dressing Present : No   Wound Concerns/Notes:  none     PAIN    Pain Assessment    Pain Intensity 1: 0 (05/04/17 1615)              Patient Stated Pain Goal: 0  o Interventions for Pain:  none  o Intervention effective:   o Time of last intervention  o Reassessment Completed: yes      Last 3 Weights:  Last 3 Recorded Weights in this Encounter    05/03/17 1147 05/03/17 2015 05/04/17 0515   Weight: 64.9 kg (143 lb) 64 kg (141 lb) 63.8 kg (140 lb 10.5 oz)     Weight change:      INTAKE/OUPUT    Current Shift:      Last three shifts: 05/03 0701 - 05/04 1900  In: 650 [P.O.:600; I.V.:50]  Out: 1988      LAB RESULTS     Recent Labs      05/04/17 0318 05/03/17 0520 05/02/17   1130   WBC  18.0*  16.6*  12.6   HGB  8.9*  10.4*  9.4*   HCT  27.1*  30.9*  28.2*   PLT  124*  137  105*        Recent Labs      05/04/17 0318 05/03/17 0520 05/02/17   1130   NA  135*  138  130*   K  3.6  3.8  4.2   GLU  187*  86  627*   BUN  42*  37*  97*   CREA  8.79*  7.40*  15.30*   CA  8.5  9.5  9.5  9.2   MG   --    --   2.0   INR   --    --   1.1       RECOMMENDATIONS AND DISCHARGE PLANNING     1. Pending tests/procedures/ Plan of Care or Other Needs: MRI     2. Discharge plan for patient and Needs/Barriers: self care     3. Estimated Discharge Date: 05/06/17 Posted on Whiteboard in [address] [place] [address] Room: yes      4. The patient's care plan was reviewed with the oncoming nurse. \"HEALS\" SAFETY CHECK      Fall Risk    Total Score: 3    Safety Measures: Safety Measures: Bed/Chair-Wheels locked, Bed in low position, Gripper socks, Side rails X 3, Call light within reach    A safety check occurred in the patient's room between off going nurse and oncoming nurse listed above.     The safety check included the below items  Area Items   H  High Alert Medications - Verify all high alert medication drips (heparin, PCA, etc.)   E  Equipment - Suction is set up for ALL patients (with wyatt)  - Red plugs utilized for all equipment (IV pumps, etc.)  - WOWs wiped down at end of shift.  - Room stocked with oxygen, suction, and other unit-specific supplies   A  Alarms - Bed alarm is set for fall risk patients  - Ensure chair alarm is in place and activated if patient is up in a chair   L  Lines - Check IV for any infiltration  - Mirza bag is empty if patient has a Mirza   - Tubing and IV bags are labeled   S  Safety   - Room is clean, patient is clean, and equipment is clean. - Hallways are clear from equipment besides carts. - Fall bracelet on for fall risk patients  - Ensure room is clear and free of clutter  - Suction is set up for ALL patients (with wyatt)  - Hallways are clear from equipment besides carts.    - Isolation precautions followed, supplies available outside room, sign posted     Maryann Frankel

## 2017-05-05 NOTE — CDMP QUERY
Wound care nurse has assessed this patient and noted the following  to be HAPI.           left  inner  buttocks    2x3    DTI      HAPI        right  inner  buttocks    3 x 6  DTI   HAPI        If you concur, please document in progress notes and d/c summary as HAPI        =>  Thank you,   Vernon Nova   RN,  BSN,  [address]

## 2017-05-05 NOTE — PROGRESS NOTES
Infectious Disease Progress Note    Requested by: Dr. Tiffanie crystal    Reason: sepsis, gram negative bacteremia    Current abx Prior abx   Piperacillin/tazobactam,  since 5/3 Vancomycin 5/3     Lines:       Assessment :    48 y.o.  female with history of type 2 DM (uncontrolled), lupus, ESRD on HD who presented to the ED on 5/2/17 for evaluation of SOB. Clinical picture consistent with sepsis (POA) due to e.coli bloodstream infection. Bloodstream infection likely due to e.coli cystitis. However, blood cultures 5/4 still positive. Delayed clearance could be due to concurrent immunocompromised state. Will repeat blood cultures to determine time of clearance of bacteremia. Ct abdomen/pelvis didn't reveal any obvious inflammation/infection. No vegetations on transthoracic echo. No erythema over LUE fistula site. Diarrhea on admission could be due to cellcept. No further diarrhea. Clinically better    Recommendations:    1. discontinue pip/tazo. Start gentamicin  2. F/u blood cultures 5/4  3. Repeat blood cultures today  4. Will need further work up if blood cultures from today return positive. Above plan was discussed in details with patient. Will d/w dr Gay Cason. Please call me if any further questions or concerns. Will continue to participate in the care of this patient. subjective:    Feels better. Patient denies headaches, visual disturbances, sore throat, runny nose, earaches, cp, sob, chills, cough, abdominal pain, diarrhea, burning micturition, pain or weakness in extremities. she denies flank pain. Patient's home Medications    PREDNISONE (DELTASONE) 5 MG TABLET    Take 5 mg by mouth.        Current Facility-Administered Medications   Medication Dose Route Frequency    insulin glargine (LANTUS) injection 20 Units  20 Units SubCUTAneous DAILY    [START ON 5/6/2017] epoetin dwaine (EPOGEN;PROCRIT) injection 10,000 Units  10,000 Units IntraVENous DIALYSIS TUE, THU & SAT  predniSONE (DELTASONE) tablet 5 mg  5 mg Oral DAILY WITH BREAKFAST    Piperacillin-tazobactam (ZOSYN) 0.75 gm Supplemental Dosing by Pharmacy   Other Rx Dosing/Monitoring    insulin lispro (HUMALOG) injection   SubCUTAneous AC&HS    midodrine (PROAMITINE) tablet 2.5 mg  2.5 mg Oral BID WITH MEALS    Lactobacillus Acidoph & Bulgar [facility]) tablet 2 Tab  2 Tab Oral BID    aspirin delayed-release tablet 81 mg  81 mg Oral DAILY    pravastatin (PRAVACHOL) tablet 20 mg  20 mg Oral QHS    fluticasone (FLONASE) 50 mcg/actuation nasal spray 2 Spray  2 Spray Both Nostrils DAILY    piperacillin-tazobactam (ZOSYN) 2.25 g in 0.9% sodium chloride (MBP/ADV) 50 mL MBP  2.25 g IntraVENous Q12H    sodium chloride (NS) flush 5-10 mL  5-10 mL IntraVENous PRN    doxercalciferol (HECTOROL) 4 mcg/2 mL injection 1 mcg  1 mcg IntraVENous DIALYSIS ONCE    glucose chewable tablet 16 g  4 Tab Oral PRN    glucagon (GLUCAGEN) injection 1 mg  1 mg IntraMUSCular PRN    dextrose (D50W) injection syrg 12.5-25 g  25-50 mL IntraVENous PRN    acetaminophen (TYLENOL) tablet 650 mg  650 mg Oral Q4H PRN    diphenhydrAMINE (BENADRYL) capsule 25 mg  25 mg Oral Q4H PRN    ondansetron (ZOFRAN) injection 4 mg  4 mg IntraVENous Q4H PRN    bisacodyl (DULCOLAX) tablet 10 mg  10 mg Oral DAILY PRN    heparin (porcine) injection 5,000 Units  5,000 Units SubCUTAneous Q12H    cinacalcet (SENSIPAR) tablet 30 mg  30 mg Oral DAILY WITH BREAKFAST    sevelamer carbonate (RENVELA) tab 1,600 mg  1,600 mg Oral TID WITH MEALS    B complex-vitaminC-folic acid (NEPHROCAP) cap  1 Cap Oral DAILY       Allergies: Sulfa (sulfonamide antibiotics)    Temp (24hrs), Av.7 °F (37.1 °C), Min:97 °F (36.1 °C), Max:99.6 °F (37.6 °C)    Visit Vitals    /73 (BP 1 Location: Right leg, BP Patient Position: At rest)    Pulse 83    Temp 98.5 °F (36.9 °C)    Resp 18    Ht 5' 9\" (1.753 m)    Wt 64.4 kg (142 lb)    SpO2 93%    Breastfeeding No    BMI 20.97 kg/m2       ROS: 12 point ROS obtained in details. Pertinent positives as mentioned in HPI,   otherwise negative    Physical Exam:    General: Well developed, well nourished female laying on the bed AAOx3 in no acute distress. General:   awake alert and oriented   HEENT:  Normocephalic, atraumatic, PERRL, EOMI, no scleral icterus or pallor; no conjunctival hemmohage;  nasal and oral mucous are moist and without evidence of lesions. No thrush. Neck supple, no bruits. Lymph Nodes:   no cervical, axillary or inguinal adenopathy   Lungs:   non-labored, bilaterally clear to auscultation- no crackles wheezes rales or rhonchi   Heart:  RRR, s1 and s2;  rubs or gallops, no edema, + pedal pulses   Abdomen:  soft, non-distended, active bowel sounds, no hepatomegaly, no splenomegaly. Non-tender   Genitourinary:  deferred   Extremities:   no clubbing, cyanosis; no joint effusions or swelling;  Full ROM of all large joints to the upper and lower extremities; muscle mass appropriate for age   Neurologic:  No gross focal motor or sensory deficits                        Skin:  No rash or ulcers noted   Back:  no spinal or paraspinal muscle tenderness or rigidity, no CVA tenderness     Psychiatric:  No suicidal or homicidal ideations, appropriate mood and affect         Labs: Results:   Chemistry Recent Labs      05/05/17 0312 05/04/17 0318 05/03/17 0520 05/02/17   1130   GLU  140*  187*  86  627*   NA  138  135*  138  130*   K  3.9  3.6  3.8  4.2   CL  104  99*  102  92*   CO2  24  21  22  20*   BUN  15  42*  37*  97*   CREA  4.79*  8.79*  7.40*  15.30*   CA  8.8  8.5  9.5  9.5  9.2   AGAP  10  15  14  18   BUCR  3*  5*  5*  6*   AP   --    --    --   151*   TP   --    --    --   7.6   ALB   --    --    --   3.2*   GLOB   --    --    --   4.4*   AGRAT   --    --    --   0.7*      CBC w/Diff Recent Labs      05/05/17 0312 05/04/17 0318 05/03/17 0520   WBC  17.8*  18.0*  16.6*   RBC  2.66*  2.92*  3.35*   HGB 8. 1*  8.9*  10.4*   HCT  25.1*  27.1*  30.9*   PLT  140  124*  137   GRANS  87*  79*  86*   LYMPH  2*  9*  9*   EOS  0  4  3      Microbiology Recent Labs      05/04/17   0425  05/04/17   0318  05/02/17   1210  05/02/17   1145  05/02/17   1130   CULT  AEROBIC BOTTLE  GRAM NEGATIVE RODS  *  NO GROWTH 1 DAY  [id number]  COLONIES/mL  ESCHERICHIA COLI  *  ANAEROBIC BOTTLE  ESCHERICHIA COLI  For Susceptibility Refer to Culture  [id number]  *  ANAEROBIC BOTTLE  ESCHERICHIA COLI  *          RADIOLOGY:    All available imaging studies/reports in [facility] for this admission were reviewed    Dr. Jessa Escalona, Infectious Disease Specialist  [phone]  May 5, 2017  2:47 PM

## 2017-05-05 NOTE — PROGRESS NOTES
Ortho    Mri reviewed    Appears to be lesions consistent with brown tumor and hyperparathyroidism secondary to lower calcium and possible increased PTH.     No surgery currently indicated    Pt is weight bearing as tolerated    F/u as O/P    Thanks    AM

## 2017-05-06 LAB
ANION GAP BLD CALC-SCNC: 12 MMOL/L (ref 3–18)
BACTERIA SPEC CULT: ABNORMAL
BASOPHILS # BLD AUTO: 0 K/UL (ref 0–0.06)
BASOPHILS # BLD: 0 % (ref 0–3)
BUN SERPL-MCNC: 25 MG/DL (ref 7–18)
BUN/CREAT SERPL: 4 (ref 12–20)
CALCIUM SERPL-MCNC: 8.9 MG/DL (ref 8.5–10.1)
CHLORIDE SERPL-SCNC: 102 MMOL/L (ref 100–108)
CO2 SERPL-SCNC: 27 MMOL/L (ref 21–32)
CREAT SERPL-MCNC: 6.79 MG/DL (ref 0.6–1.3)
DIFFERENTIAL METHOD BLD: ABNORMAL
EOSINOPHIL # BLD: 0.3 K/UL (ref 0–0.4)
EOSINOPHIL NFR BLD: 2 % (ref 0–5)
ERYTHROCYTE [DISTWIDTH] IN BLOOD BY AUTOMATED COUNT: 13.2 % (ref 11.6–14.5)
GENTAMICIN SERPL-MCNC: 3.4 UG/ML (ref 0.5–10)
GLUCOSE BLD STRIP.AUTO-MCNC: 193 MG/DL (ref 70–110)
GLUCOSE BLD STRIP.AUTO-MCNC: 257 MG/DL (ref 70–110)
GLUCOSE BLD STRIP.AUTO-MCNC: 88 MG/DL (ref 70–110)
GLUCOSE SERPL-MCNC: 120 MG/DL (ref 74–99)
GRAM STN SPEC: ABNORMAL
GRAM STN SPEC: ABNORMAL
HCT VFR BLD AUTO: 27.2 % (ref 35–45)
HGB BLD-MCNC: 9.1 G/DL (ref 12–16)
LYMPHOCYTES # BLD AUTO: 15 % (ref 20–51)
LYMPHOCYTES # BLD: 2.5 K/UL (ref 0.8–3.5)
MCH RBC QN AUTO: 31.2 PG (ref 24–34)
MCHC RBC AUTO-ENTMCNC: 33.5 G/DL (ref 31–37)
MCV RBC AUTO: 93.2 FL (ref 74–97)
MONOCYTES # BLD: 0.7 K/UL (ref 0–1)
MONOCYTES NFR BLD AUTO: 4 % (ref 2–9)
NEUTS BAND NFR BLD MANUAL: 2 % (ref 0–5)
NEUTS SEG # BLD: 13.2 K/UL (ref 1.8–8)
NEUTS SEG NFR BLD AUTO: 77 % (ref 42–75)
PHOSPHATE SERPL-MCNC: 4.1 MG/DL (ref 2.5–4.9)
PLATELET # BLD AUTO: 174 K/UL (ref 135–420)
PLATELET COMMENTS,PCOM: ABNORMAL
PMV BLD AUTO: 10.9 FL (ref 9.2–11.8)
POTASSIUM SERPL-SCNC: 3.3 MMOL/L (ref 3.5–5.5)
RBC # BLD AUTO: 2.92 M/UL (ref 4.2–5.3)
RBC MORPH BLD: ABNORMAL
SERVICE CMNT-IMP: ABNORMAL
SODIUM SERPL-SCNC: 141 MMOL/L (ref 136–145)
WBC # BLD AUTO: 16.7 K/UL (ref 4.6–13.2)

## 2017-05-06 PROCEDURE — 74011250636 HC RX REV CODE- 250/636: Performed by: FAMILY MEDICINE

## 2017-05-06 PROCEDURE — 74011250636 HC RX REV CODE- 250/636: Performed by: INTERNAL MEDICINE

## 2017-05-06 PROCEDURE — 84100 ASSAY OF PHOSPHORUS: CPT | Performed by: INTERNAL MEDICINE

## 2017-05-06 PROCEDURE — 74011636637 HC RX REV CODE- 636/637: Performed by: INTERNAL MEDICINE

## 2017-05-06 PROCEDURE — 80170 ASSAY OF GENTAMICIN: CPT | Performed by: INTERNAL MEDICINE

## 2017-05-06 PROCEDURE — 65660000000 HC RM CCU STEPDOWN

## 2017-05-06 PROCEDURE — 82962 GLUCOSE BLOOD TEST: CPT

## 2017-05-06 PROCEDURE — 90935 HEMODIALYSIS ONE EVALUATION: CPT

## 2017-05-06 PROCEDURE — 80048 BASIC METABOLIC PNL TOTAL CA: CPT | Performed by: INTERNAL MEDICINE

## 2017-05-06 PROCEDURE — 85025 COMPLETE CBC W/AUTO DIFF WBC: CPT | Performed by: INTERNAL MEDICINE

## 2017-05-06 PROCEDURE — 36415 COLL VENOUS BLD VENIPUNCTURE: CPT | Performed by: INTERNAL MEDICINE

## 2017-05-06 PROCEDURE — 74011250637 HC RX REV CODE- 250/637: Performed by: INTERNAL MEDICINE

## 2017-05-06 RX ORDER — GENTAMICIN SULFATE 80 MG/100ML
80 INJECTION, SOLUTION INTRAVENOUS
Status: COMPLETED | OUTPATIENT
Start: 2017-05-06 | End: 2017-05-06

## 2017-05-06 RX ADMIN — PRAVASTATIN SODIUM 20 MG: 20 TABLET ORAL at 00:00

## 2017-05-06 RX ADMIN — ERYTHROPOIETIN 10000 UNITS: 10000 INJECTION, SOLUTION INTRAVENOUS; SUBCUTANEOUS at 11:29

## 2017-05-06 RX ADMIN — PRAVASTATIN SODIUM 20 MG: 20 TABLET ORAL at 22:09

## 2017-05-06 RX ADMIN — SEVELAMER CARBONATE 1600 MG: 800 TABLET, FILM COATED ORAL at 17:50

## 2017-05-06 RX ADMIN — Medication 10 ML: at 22:14

## 2017-05-06 RX ADMIN — DOXERCALCIFEROL 1 MCG: 4 INJECTION, SOLUTION INTRAVENOUS at 11:29

## 2017-05-06 RX ADMIN — GENTAMICIN SULFATE 80 MG: 80 INJECTION, SOLUTION INTRAVENOUS at 11:25

## 2017-05-06 RX ADMIN — HEPARIN SODIUM 5000 UNITS: 5000 INJECTION, SOLUTION INTRAVENOUS; SUBCUTANEOUS at 17:05

## 2017-05-06 RX ADMIN — INSULIN LISPRO 9 UNITS: 100 INJECTION, SOLUTION INTRAVENOUS; SUBCUTANEOUS at 22:12

## 2017-05-06 RX ADMIN — LACTOBACILLUS TAB 2 TABLET: TAB at 17:50

## 2017-05-06 NOTE — DIALYSIS
ACUTE HEMODIALYSIS FLOW SHEET    HEMODIALYSIS ORDERS: Physician: Dr. Chelsi Kamarau: Finn         Duration: 3.5 hr  BFR: 400   DFR: 600   Dialysate:  Temp 36.9       K+  2    Ca+  2.5    Na 140    Bicarb 30   Weight: 48 kg  Bed Scale [x]     Unable to Obtain []      Dry weight/UF Goal: 2000 ml  Access AVF     Needle Gauge 15   Heparin []  Bolus      Units    [] Hourly       Units    [x]None     Catheter locking solution  NA   Pre BP: 151/71    Pulse:94  Temperature:  97.8  Respirations: 18    Tx: NS       ml/Bolus  Other        [x] N/A   Labs: Pre        Post:        [x] N/A   Additional Orders(medications, blood products, hypotension management):      [x] N/A     [x]Time Out/Safety Check  [x] [facility] Consent Verified     CATHETER ACCESS: [x]N/A   []Right   []Left   []IJ     []Fem   [] First use X-ray verified     []Tunnel                [] Non Tunneled   []No S/S infection  []Redness  []Drainage []Cultured []Swelling []Pain   []Medical Aseptic Prep Utilized   []Dressing Changed  [] Biopatch  Date:    []Clotted   []Patent   Flows: []Good  []Poor  []Reversed   If access problem,  notified: []Yes    []N/A  Date:           GRAFT/FISTULA ACCESS:  [x]N/A     []Right     [x]Left     [x]UE     []LE   [x]AVG   []AVF        []Buttonhole    []Medical Aseptic Prep Utilized   [x]No S/S infection  []Redness  []Drainage []Cultured []Swelling []Pain    Bruit:   [x] Strong    [] Weak       Thrill :   [x] Strong    [] Weak       Needle Gauge:  15  Length: 1   If access problem,  notified: []Yes     [x]N/A  Date:        Please describe access if present and not used:       GENERAL ASSESSMENT:      LUNGS:  Rate 18  SaO2% 98  [] N/A    [x] Clear  [] Coarse  [] Crackles  [] Wheezing        [] Diminished     Location : []RLL   []LLL    []RUL  []TELLY   Cough: []Productive  []Dry  [x]N/A   Respirations:  [x]Easy  []Labored   Therapy:  [x]RA  []NC   l/min    Mask: []NRB []Venti       O2% []Ventilator  []Intubated  [] Trach  [] BiPaP   CARDIAC: [x]Regular      [] Irregular   [] Pericardial Rub  [] JVD        []  Monitored  [] Bedside  [] Remotely monitored [] N/A  Rhythm:      EDEMA: [] None  [x]Generalized  [] Pitting [] 1    [] 2    [] 3    [] 4                 [] Facial  [] Pedal  []  UE  [x] LE   SKIN:   [x] Warm  [] Hot     [] Cold   [x] Dry     [] Pale   [] Diaphoretic                  [] Flushed  [] Jaundiced  [] Cyanotic  [] Rash  [] Weeping   LOC:    [x] Alert      []Oriented:    [] Person     [] Place  []Time               [x] Confused  [x] Lethargic  [] Medicated  [] Non-responsive   GI / ABDOMEN:  [] Flat    [] Distended    [x] Soft    [] Firm   []  Obese                             [] Diarrhea  [x] Bowel Sounds  [] Nausea  [] Vomiting   / URINE ASSESSMENT: [] Voiding   [x] Oliguria  [] Anuria   []  Mirza  [] Incontinent    []  Incontinent Brief    [] Bathroom Privileges     PAIN: [x] 0 []1  []2   []3   []4   []5   []6   []7   []8   []9   []10 Scale 0-10  Action/Follow Up:      MOBILITY:  [] Amb    [] Amb/Assist    [x] Bed    [] Wheelchair  [] Stretcher      All Vitals and Treatment Details on Attached [address]: [facility]          Room # 211     [] 1st Time Acute  [] Stat  [] Routine  [] Urgent     [x] Acute Room  []  Bedside  [] ICU/CCU  [] ER   Isolation Precautions:  [x] Dialysis   [] Airborne   [] Contact    [] Reverse   Special Considerations:         [] Blood Consent Verified []N/A     ALLERGIES:  [] NKA       Sulfa        Code Status: [x] Full Code  [] DNR  [] Other           HBsAg ONLY: Date Drawn 05/02/17         [x]Negative []Positive []Unknown   HBsAb: Date 05/02/17    [] Susceptible   [x] Blieat99 []Not Drawn  [] Drawn     Current Labs:    Date of Labs: Today [x]     Results for Anne Marie Shannon (MRN [id number]) as of 5/6/2017 16:31   Ref.  Range 5/6/2017 02:22   WBC Latest Ref Range: 4.6 - 13.2 K/uL 16.7 (H)   RBC Latest Ref Range: 4.20 - 5.30 M/uL 2.92 (L)   HGB Latest Ref Range: 12.0 - 16.0 g/dL 9.1 (L)   HCT Latest Ref Range: 35.0 - 45.0 % 27.2 (L)   MCV Latest Ref Range: 74.0 - 97.0 FL 93.2   MCH Latest Ref Range: 24.0 - 34.0 PG 31.2   MCHC Latest Ref Range: 31.0 - 37.0 g/dL 33.5   RDW Latest Ref Range: 11.6 - 14.5 % 13.2   PLATELET Latest Ref Range: 135 - 420 K/uL 174   MPV Latest Ref Range: 9.2 - 11.8 FL 10.9   NEUTROPHILS Latest Ref Range: 42 - 75 % 77 (H)   BAND NEUTROPHILS Latest Ref Range: 0 - 5 % 2   LYMPHOCYTES Latest Ref Range: 20 - 51 % 15 (L)   MONOCYTES Latest Ref Range: 2 - 9 % 4   EOSINOPHILS Latest Ref Range: 0 - 5 % 2   BASOPHILS Latest Ref Range: 0 - 3 % 0   DF Latest Units:   MANUAL   ABS. NEUTROPHILS Latest Ref Range: 1.8 - 8.0 K/UL 13.2 (H)   ABS. LYMPHOCYTES Latest Ref Range: 0.8 - 3.5 K/UL 2.5   ABS. MONOCYTES Latest Ref Range: 0 - 1.0 K/UL 0.7   ABS. EOSINOPHILS Latest Ref Range: 0.0 - 0.4 K/UL 0.3   ABS. BASOPHILS Latest Ref Range: 0.0 - 0.06 K/UL 0.0   RBC COMMENTS Latest Units:   NORMOCYTIC, NORMO. ..    PLATELET COMMENTS Latest Units:   ADEQUATE PLATELETS   Sodium Latest Ref Range: 136 - 145 mmol/L 141   Potassium Latest Ref Range: 3.5 - 5.5 mmol/L 3.3 (L)   Chloride Latest Ref Range: 100 - 108 mmol/L 102   CO2 Latest Ref Range: 21 - 32 mmol/L 27   Anion gap Latest Ref Range: 3.0 - 18 mmol/L 12   Glucose Latest Ref Range: 74 - 99 mg/dL 120 (H)   BUN Latest Ref Range: 7.0 - 18 MG/DL 25 (H)   Creatinine Latest Ref Range: 0.6 - 1.3 MG/DL 6.79 (H)   BUN/Creatinine ratio Latest Ref Range: 12 - 20   4 (L)   Calcium Latest Ref Range: 8.5 - 10.1 MG/DL 8.9   Phosphorus Latest Ref Range: 2.5 - 4.9 MG/DL 4.1   GFR est non-AA Latest Ref Range: >60 ml/min/1.73m2 6 (L)   GFR est AA Latest Ref Range: >60 ml/min/1.73m2 8 (L)   Gentamicin,random Latest Ref Range: 0.5 - 10 ug/ml 3.4                                                                                                                              DIET:    [x] Renal    [] Other     [x] NPO     []  Diabetic PRIMARY NURSE REPORT: First initial/Last name/Title      Pre Dialysis: Jake Roe RN   Time: 200     EDUCATION:      [x] Patient [] Other         Knowledge Basis: []None []Minimal [x] Substantial   Barriers to learning  []N/A   [x] Access Care     [x] S&S of infection     [x] Fluid Management     [x]K+     [x]Procedural    []Albumin     [x] Medications     [] Tx Options     [] Transplant     [x] Diet     [] Other   Teaching Tools:  [x] Explain  [] Demo  [] Handouts [] Video  Patient response:  [x] Verbalized understanding  [] Teach back  [] Return demonstration [] Requires follow up   Inappropriate due to            [address] Before each treatment:     Machine Number:                   [facility]                                  [x] Unit Machine #  5 with centralized RO                                  [] Portable Machine #1/RO serial # [id number]                                  [] Portable Machine #2/RO serial # [id number]                                  [] Portable Machine #3/RO serial # [id number]                                                                                                       [facility]                                  [] Portable Machine #11/RO serial # [id number]                                   [] Portable Machine #12/RO serial # [id number]                                  [] Portable Machine #13/RO serial #  [id number]      Alarm Test:  Pass time 0830         Other:         [x]RO/Machine Log Complete   Temp  36.8            [x] Extracorporeal Circuit Tested for integrity   Dialysate: pH  7.4        Conductivity: Meter   14.0     HD Machine   14.2         TCD: 14.0  Dialyzer Lot # [id number]           Blood Tubing Lot # [id number]         Saline Lot #  -[id number]     CHLORINE TESTING-Before each treatment and every 4 hours    Total Chlorine: [x] less than 0.1 ppm  Time: 0800 4 Hr/2nd Check Time: 1100     (if greater than 0.1 ppm from Primary then every 30 minutes from Secondary)     TREATMENT INITIATION  with Dialysis Precautions:     [x] All Connections Secured                 [x] Saline Line Double Clamped   [x] Venous Parameters Set                  [x] Arterial Parameters Set    [x] Prime Given  250 ml                        [x]Air Foam Detector Engaged      Treatment Initiation Note: Arrive to dialysis room. Pt alert and oriented x 3. No acute distress noted prior to HD tx. Treatment was started via left AVF, both lines flushed and aspirated well. Medication Dose Volume Route Initials Dialyzer Cleared:   [] Good [x] Fair  [] Poor    Blood processed:  65L  UF Removed  2000 Ml    Post Wt: 62 kg  POst BP: 138/71 Pulse:  97 Respirations: 16 Temperature: 97.8          Post Tx Vascular Access: AVF/AVG: Bleeding stopped Art  15 min. Juan. 15 Min   N/A                                   Catheter: Locking solution:Heparin 1:1000  Art. ml  Juan. ml N/A                                   Post Assessment:                                    Skin:    [x] Warm  [x] Dry [] Diaphoretic    [] Flushed  [] Pale [] Cyanotic   [facility] Signatures Title Initials  Time Lungs: [x] Clear    [] Course  [] Crackles  [] Wheezing [] Diminished   Thresea Mast RN NP  Cardiac:   [x] Regular   [] Irregular   [] Monitor  [] N/A  Rhythm:           Edema:    [x] None    [] General     [] Facial   [] Pedal    [] UE    [] LE       Pain:   [x]0  []1  []2   []3  []4   []5   []6   []7   []8   []9   []10         Post Treatment Note: Pt tolerated HD tx well, completed treatment without distress. Total fluid removed was 2000 ml.        POST TREATMENT PRIMARY NURSE HANDOFF REPORT:     First initial/Last name/Title         Post Dialysis: Alba Lee RN Time:  1400     Abbreviations: AVG-arterial venous graft, AVF-arterial venous fistula, IJ-Internal Jugular, Subcl-Subclavian, Fem-Femoral, Tx-treatment, AP/HR-apical heart rate, DFR-dialysate flow rate, BFR-blood flow rate, AP-arterial pressure, -venous pressure, UF-ultrafiltrate, TMP-transmembrane pressure, Juan-Venous, Art-Arterial, RO-Reverse Osmosis

## 2017-05-06 NOTE — PROGRESS NOTES
SUBJECTIVE:    Feeling better. Saw her in HD room. No chest or abdominal pain. No cough. Last BM was yesterday. No N/V/D. Lives with mother. Wheelchair bound for 4 years. OBJECTIVE:    Visit Vitals    /65 (BP 1 Location: Right arm, BP Patient Position: At rest)    Pulse 89    Temp 98.4 °F (36.9 °C)    Resp 18    Ht 5' 9\" (1.753 m)    Wt 64.4 kg (141 lb 14.4 oz)    SpO2 95%    Breastfeeding No    BMI 20.95 kg/m2     CVS: RRR  RS: CTA bilaterally, no wheezes  CVS: RRR  GI: NT, ND, BS +  CNS: moves both UE well. Follows commands  Extremities: no pedal edema. ASSESSMENT:    1. Acute metabolic encephalopathy due to sepsis, improved  2. E.coli UTI with sepsis  3. Fluid overload due to missed HD, resolved   4. Fever with immunocompromised status, improved  5. Hyponatremia due to fluid overload, resolved  6. DM 2 with A1c 12. 3. Off insulin drip. 7. Loose BMs, resolved currently  8. ESRD  9. Anemia of ESRD  10. Chronic right basal ganglia and left thalamic old lacunar infarct. 11.Bilateral right greater than left mastoid air cells effusion/suggesting mastoiditis and middle ears effusion. 12. HTN  13. Lupus  14. Hypoxia due to fluid overload and Bibasilar atelectasis, resolved. 15. Leukocytosis  16. Pressure sore on coccygeal area  17. Hypertriglyceridemia   18. Right hepatic lobe lesion on CT - nothing acute on US  19. Low-attenuation in the upper pole the left kidney most likely a cyst.  20. Lytic lesion of the right iliac bone measuring 3.6 x 1.4 cm. 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 Decub wounds  @ left  inner buttocks and right  inner buttocks, POA. PLAN:    Cont current antibiotic per ID  repeat blood c/s in am  D/c midodrine and monitor  Cont HD per nephrology    Possible discharge next week if remains stable.   Will need SNF     --> total time greater than 30 minutes    CMP:   Lab Results   Component Value Date/Time     05/06/2017 02:22 AM    K 3.3 (L) 05/06/2017 02:22 AM     05/06/2017 02:22 AM    CO2 27 05/06/2017 02:22 AM    AGAP 12 05/06/2017 02:22 AM     (H) 05/06/2017 02:22 AM    BUN 25 (H) 05/06/2017 02:22 AM    CREA 6.79 (H) 05/06/2017 02:22 AM    GFRAA 8 (L) 05/06/2017 02:22 AM    GFRNA 6 (L) 05/06/2017 02:22 AM    CA 8.9 05/06/2017 02:22 AM    PHOS 4.1 05/06/2017 02:22 AM     CBC:   Lab Results   Component Value Date/Time    WBC 16.7 (H) 05/06/2017 02:22 AM    HGB 9.1 (L) 05/06/2017 02:22 AM    HCT 27.2 (L) 05/06/2017 02:22 AM     05/06/2017 02:22 AM     Recent Glucose Results:   Lab Results   Component Value Date/Time     (H) 05/06/2017 02:22 AM

## 2017-05-06 NOTE — PROGRESS NOTES
RENAL PROGRESS NOTE: Pt seen on dialysis. Follow up of ESRD     Present on Admission:   DM type 2 (diabetes mellitus, type 2) ([facility])         Subjective: Fees little better today, no new complain. On IV antibiotics. Patient is on Dialysis. Objective:    Patient Vitals for the past 12 hrs:   Temp Pulse Resp BP SpO2   05/06/17 1300 - 96 18 91/56 -   05/06/17 1230 - 91 18 (!) 89/57 -   05/06/17 1200 - 97 18 102/54 -   05/06/17 1130 - 98 18 121/67 -   05/06/17 1100 - 95 18 123/69 -   05/06/17 1030 - 94 18 127/71 -   05/06/17 1000 98 °F (36.7 °C) 92 18 156/76 96 %   05/06/17 0801 98.4 °F (36.9 °C) 89 18 140/65 95 %   05/06/17 0500 98.5 °F (36.9 °C) 88 18 161/74 95 %        Intake/Output Summary (Last 24 hours) at 05/06/17 1334  Last data filed at 05/05/17 2313   Gross per 24 hour   Intake              240 ml   Output                0 ml   Net              240 ml       Physical Assessment:     General Appearance: NAD  Lung: clear to auscultation  Heart: regular rate and rhythm and no murmurs, clicks or gallops  Lower Extremities: no  edema   Access: AVF, qb 400    Labs    CBC w/Diff    Recent Labs      05/06/17 0222 05/05/17 0312 05/04/17 0318   WBC  16.7*  17.8*  18.0*   RBC  2.92*  2.66*  2.92*   HGB  9.1*  8.1*  8.9*   HCT  27.2*  25.1*  27.1*   MCV  93.2  94.4  92.8   MCH  31.2  30.5  30.5   MCHC  33.5  32.3  32.8   RDW  13.2  13.4  13.4    Recent Labs      05/06/17 0222 05/05/17 0312 05/04/17 0318   BANDS  2  5   --    MONOS  4  6  7   EOS  2  0  4   BASOS  0  0  1   RDW  13.2  13.4  13.4        Comprehensive Metabolic Profile    Recent Labs      05/06/17 0222 05/05/17 0312 05/04/17   0318   NA  141  138  135*   K  3.3*  3.9  3.6   CL  102  104  99*   CO2  27  24  21   BUN  25*  15  42*   CREA  6.79*  4.79*  8.79*    Recent Labs      05/06/17 0222 05/05/17 0312 05/04/17 0318   CA  8.9  8.8  8.5   PHOS  4.1   --   3.9          Basic Metabolic Profile       results  reviwed. MEDS:Reviwed.   Current Facility-Administered Medications   Medication Dose Route Frequency Provider Last Rate Last Dose    GENTAMICIN INFORMATION NOTE   Other PRN Asya Cohen MD        insulin glargine (LANTUS) injection 20 Units  20 Units SubCUTAneous DAILY Yossi Mijares MD   20 Units at 05/05/17 0816    epoetin dwaine (EPOGEN;PROCRIT) injection 10,000 Units  10,000 Units IntraVENous DIALYSIS TUE, THU & SAT Enriqueta Fonseca MD   10,000 Units at 05/06/17 1129    predniSONE (DELTASONE) tablet 5 mg  5 mg Oral DAILY WITH BREAKFAST Delphine Monsalve MD   5 mg at 05/05/17 9998    insulin lispro (HUMALOG) injection   SubCUTAneous AC&HS Yossi Mijares MD   Stopped at 05/05/17 2200    Lactobacillus Acidoph & Bulgar [facility]) tablet 2 Tab  2 Tab Oral BID Yossi Mijares MD   2 Tab at 05/05/17 1750    aspirin delayed-release tablet 81 mg  81 mg Oral DAILY Yossi Mijares MD   81 mg at 05/05/17 0816    pravastatin (PRAVACHOL) tablet 20 mg  20 mg Oral QHS Yossi Mijares MD   20 mg at 05/06/17 0000    fluticasone (FLONASE) 50 mcg/actuation nasal spray 2 Spray  2 Spray Both Nostrils DAILY Yossi Mijares MD   2 [place] at 05/05/17 0900    sodium chloride (NS) flush 5-10 mL  5-10 mL IntraVENous PRN Herminia Leach MD        doxercalciferol (HECTOROL) 4 mcg/2 mL injection 1 mcg  1 mcg IntraVENous DIALYSIS ONCE Enriqueta Fonseca MD   1 mcg at 05/06/17 1129    glucose chewable tablet 16 g  4 Tab Oral PRN Herminia Leach MD        glucagon [facility]) injection 1 mg  1 mg IntraMUSCular PRN Herminia Leach MD        dextrose (D50W) injection syrg 12.5-25 g  25-50 mL IntraVENous PRN Herminia Leach MD        acetaminophen (TYLENOL) tablet 650 mg  650 mg Oral Q4H PRN Delphine Monsalve MD   650 mg at 05/04/17 1055    diphenhydrAMINE (BENADRYL) capsule 25 mg  25 mg Oral Q4H PRN Delphine Monsalve MD        [facility]) injection 4 mg  4 mg IntraVENous Q4H PRN Ephriam MD Gricel   4 mg at 05/03/17 6067    bisacodyl (DULCOLAX) tablet 10 mg  10 mg Oral DAILY PRN Oswaldo Torres MD        heparin (porcine) injection 5,000 Units  5,000 Units SubCUTAneous Q12H Oswaldo Torres MD   Stopped at 05/06/17 0505    cinacalcet (SENSIPAR) tablet 30 mg  30 mg Oral DAILY WITH BREAKFAST Letty Antunez MD   30 mg at 05/05/17 1020    sevelamer carbonate (RENVELA) tab 1,600 mg  1,600 mg Oral TID WITH MEALS Letty Antunez MD   1,600 mg at 05/05/17 1750    B complex-vitaminC-folic acid (NEPHROCAP) cap  1 Cap Oral DAILY Letty Antunez MD   1 Cap at 05/05/17 0815       Impression:    ESRD: Tolerating HD well. Anemia of CKD: on  Epogen. Hyperparathyroidism Yes    Plan  Dialysis for volume and solute management  Epogen  39224 units.   Hectorol: 1 g,  Continue antibiotics for  sepsis        Letty Antunez MD

## 2017-05-07 LAB
ANION GAP BLD CALC-SCNC: 10 MMOL/L (ref 3–18)
BACTERIA SPEC CULT: ABNORMAL
BACTERIA SPEC CULT: ABNORMAL
BASOPHILS # BLD AUTO: 0.2 K/UL (ref 0–0.06)
BASOPHILS # BLD: 1 % (ref 0–3)
BUN SERPL-MCNC: 11 MG/DL (ref 7–18)
BUN/CREAT SERPL: 3 (ref 12–20)
CALCIUM SERPL-MCNC: 9 MG/DL (ref 8.5–10.1)
CHLORIDE SERPL-SCNC: 100 MMOL/L (ref 100–108)
CO2 SERPL-SCNC: 28 MMOL/L (ref 21–32)
CREAT SERPL-MCNC: 4.05 MG/DL (ref 0.6–1.3)
DIFFERENTIAL METHOD BLD: ABNORMAL
EOSINOPHIL # BLD: 0.5 K/UL (ref 0–0.4)
EOSINOPHIL NFR BLD: 3 % (ref 0–5)
ERYTHROCYTE [DISTWIDTH] IN BLOOD BY AUTOMATED COUNT: 13.3 % (ref 11.6–14.5)
GLUCOSE BLD STRIP.AUTO-MCNC: 134 MG/DL (ref 70–110)
GLUCOSE BLD STRIP.AUTO-MCNC: 194 MG/DL (ref 70–110)
GLUCOSE BLD STRIP.AUTO-MCNC: 222 MG/DL (ref 70–110)
GLUCOSE BLD STRIP.AUTO-MCNC: 82 MG/DL (ref 70–110)
GLUCOSE BLD STRIP.AUTO-MCNC: 86 MG/DL (ref 70–110)
GLUCOSE SERPL-MCNC: 35 MG/DL (ref 74–99)
GRAM STN SPEC: ABNORMAL
HCT VFR BLD AUTO: 27.5 % (ref 35–45)
HGB BLD-MCNC: 8.9 G/DL (ref 12–16)
LYMPHOCYTES # BLD AUTO: 12 % (ref 20–51)
LYMPHOCYTES # BLD: 2 K/UL (ref 0.8–3.5)
MCH RBC QN AUTO: 30.6 PG (ref 24–34)
MCHC RBC AUTO-ENTMCNC: 32.4 G/DL (ref 31–37)
MCV RBC AUTO: 94.5 FL (ref 74–97)
MONOCYTES # BLD: 0.3 K/UL (ref 0–1)
MONOCYTES NFR BLD AUTO: 2 % (ref 2–9)
NEUTS SEG # BLD: 13.7 K/UL (ref 1.8–8)
NEUTS SEG NFR BLD AUTO: 82 % (ref 42–75)
PLATELET # BLD AUTO: 203 K/UL (ref 135–420)
PLATELET COMMENTS,PCOM: ABNORMAL
PMV BLD AUTO: 11.1 FL (ref 9.2–11.8)
POTASSIUM SERPL-SCNC: 3.1 MMOL/L (ref 3.5–5.5)
RBC # BLD AUTO: 2.91 M/UL (ref 4.2–5.3)
RBC MORPH BLD: ABNORMAL
SERVICE CMNT-IMP: ABNORMAL
SERVICE CMNT-IMP: ABNORMAL
SODIUM SERPL-SCNC: 138 MMOL/L (ref 136–145)
WBC # BLD AUTO: 16.7 K/UL (ref 4.6–13.2)

## 2017-05-07 PROCEDURE — 74011250637 HC RX REV CODE- 250/637: Performed by: INTERNAL MEDICINE

## 2017-05-07 PROCEDURE — 65660000000 HC RM CCU STEPDOWN

## 2017-05-07 PROCEDURE — 74011250636 HC RX REV CODE- 250/636: Performed by: FAMILY MEDICINE

## 2017-05-07 PROCEDURE — 36415 COLL VENOUS BLD VENIPUNCTURE: CPT | Performed by: INTERNAL MEDICINE

## 2017-05-07 PROCEDURE — 87040 BLOOD CULTURE FOR BACTERIA: CPT | Performed by: INTERNAL MEDICINE

## 2017-05-07 PROCEDURE — 74011636637 HC RX REV CODE- 636/637: Performed by: FAMILY MEDICINE

## 2017-05-07 PROCEDURE — 82962 GLUCOSE BLOOD TEST: CPT

## 2017-05-07 PROCEDURE — 85025 COMPLETE CBC W/AUTO DIFF WBC: CPT | Performed by: INTERNAL MEDICINE

## 2017-05-07 PROCEDURE — 74011636637 HC RX REV CODE- 636/637: Performed by: INTERNAL MEDICINE

## 2017-05-07 PROCEDURE — 80048 BASIC METABOLIC PNL TOTAL CA: CPT | Performed by: INTERNAL MEDICINE

## 2017-05-07 PROCEDURE — 94760 N-INVAS EAR/PLS OXIMETRY 1: CPT

## 2017-05-07 RX ORDER — INSULIN GLARGINE 100 [IU]/ML
15 INJECTION, SOLUTION SUBCUTANEOUS DAILY
Status: DISCONTINUED | OUTPATIENT
Start: 2017-05-07 | End: 2017-05-09

## 2017-05-07 RX ORDER — POTASSIUM CHLORIDE 20 MEQ/1
20 TABLET, EXTENDED RELEASE ORAL ONCE
Status: COMPLETED | OUTPATIENT
Start: 2017-05-07 | End: 2017-05-07

## 2017-05-07 RX ADMIN — SEVELAMER CARBONATE 1600 MG: 800 TABLET, FILM COATED ORAL at 17:39

## 2017-05-07 RX ADMIN — INSULIN LISPRO 6 UNITS: 100 INJECTION, SOLUTION INTRAVENOUS; SUBCUTANEOUS at 12:30

## 2017-05-07 RX ADMIN — INSULIN GLARGINE 15 UNITS: 100 INJECTION, SOLUTION SUBCUTANEOUS at 10:39

## 2017-05-07 RX ADMIN — CINACALCET HYDROCHLORIDE 30 MG: 30 TABLET, COATED ORAL at 10:41

## 2017-05-07 RX ADMIN — LACTOBACILLUS TAB 2 TABLET: TAB at 10:41

## 2017-05-07 RX ADMIN — HEPARIN SODIUM 5000 UNITS: 5000 INJECTION, SOLUTION INTRAVENOUS; SUBCUTANEOUS at 17:39

## 2017-05-07 RX ADMIN — PREDNISONE 5 MG: 5 TABLET ORAL at 10:41

## 2017-05-07 RX ADMIN — NEPHROCAP 1 CAPSULE: 1 CAP ORAL at 10:41

## 2017-05-07 RX ADMIN — HEPARIN SODIUM 5000 UNITS: 5000 INJECTION, SOLUTION INTRAVENOUS; SUBCUTANEOUS at 05:26

## 2017-05-07 RX ADMIN — PRAVASTATIN SODIUM 20 MG: 20 TABLET ORAL at 21:26

## 2017-05-07 RX ADMIN — SEVELAMER CARBONATE 1600 MG: 800 TABLET, FILM COATED ORAL at 10:40

## 2017-05-07 RX ADMIN — ASPIRIN 81 MG: 81 TABLET, COATED ORAL at 10:41

## 2017-05-07 RX ADMIN — LACTOBACILLUS TAB 2 TABLET: TAB at 17:38

## 2017-05-07 RX ADMIN — INSULIN LISPRO 3 UNITS: 100 INJECTION, SOLUTION INTRAVENOUS; SUBCUTANEOUS at 21:29

## 2017-05-07 RX ADMIN — POTASSIUM CHLORIDE 20 MEQ: 20 TABLET, EXTENDED RELEASE ORAL at 10:41

## 2017-05-07 NOTE — ROUTINE PROCESS
Bedside and Verbal shift change report given to Tiffany Deleon RN  (oncoming nurse) by Conor Guerra (offgoing nurse). Report included the following information SBAR, Kardex, MAR and Recent Results.     SITUATION:    Code Status: Full Code  Reason for Admission: Sepsis ([facility]  Hyperglycemia  Acute febrile illness  UTI (urinary tract infection)   ESRD on hemodialysis ([facility]    [facility] day: 5   Problem List:       Hospital Problems  Date Reviewed: 5/3/2017          Codes Class Noted POA    DM type 2 (diabetes mellitus, type 2) ([facility] ICD-10-CM: E11.9  ICD-9-CM: 250.00  5/3/2017 Yes        UTI (urinary tract infection) ICD-10-CM: N39.0  ICD-9-CM: 599.0  5/2/2017 Unknown        Hyperglycemia ICD-10-CM: R73.9  ICD-9-CM: 790.29  5/2/2017 Unknown        [facility] ICD-10-CM: A41.9  ICD-9-CM: 038.9, 995.91  5/2/2017 Unknown        Acute febrile illness ICD-10-CM: R50.9  ICD-9-CM: 780.60  5/2/2017 Unknown        ESRD on hemodialysis ([facility] ICD-10-CM: N18.6, Z99.2  ICD-9-CM: 585.6, V45.11  5/2/2017 Unknown        Lupus (systemic lupus erythematosus) ([facility] ICD-10-CM: M32.9  ICD-9-CM: 710.0  5/2/2017 Unknown              BACKGROUND:    Past Medical History:   Past Medical History:   Diagnosis Date    CAD (coronary artery disease)     Diabetes ([facility]     ESRD (end stage renal disease) on dialysis ([facility]     MONDAY, WEDNESDAY, [address] Hypertension     Lupus ([facility]     Stroke ([facility]          Patient taking anticoagulants yes     ASSESSMENT:    Changes in Assessment Throughout Shift: Stable     Patient has Central Line: no Reasons if yes:    Patient has Mirza Cath: no Reasons if yes:       Last Vitals:     Vitals:    05/06/17 2027 05/07/17 0000 05/07/17 0300 05/07/17 0718   BP: 120/66 151/80 153/75    Pulse: (!) 101 100 98    Resp: 18 17 18    Temp: 99.8 °F (37.7 °C) 99.2 °F (37.3 °C) 98.9 °F (37.2 °C)    TempSrc:       SpO2: 97% 97% 96%    Weight:    67.4 kg (148 lb 11.2 oz)   Height:            IV and DRAINS (will only show if present)   [REMOVED] Peripheral IV 05/02/17 Right Antecubital-Site Assessment: Clean, dry, & intact  [REMOVED] Peripheral IV 05/03/17 Right Arm-Site Assessment: Clean, dry, & intact     WOUND (if present)   Wound Type:  Stage 2 pressure ulcer present on admission   Dressing present Dressing Present : No   Wound Concerns/Notes:  none     PAIN    Pain Assessment    Pain Intensity 1: 0 (05/07/17 0615)              Patient Stated Pain Goal: 0  o Interventions for Pain:  none  o Intervention effective:   o Time of last intervention  o Reassessment Completed: yes      Last 3 Weights:  Last 3 Recorded Weights in this Encounter    05/05/17 0538 05/06/17 0500 05/07/17 0718   Weight: 64.4 kg (142 lb) 64.4 kg (141 lb 14.4 oz) 67.4 kg (148 lb 11.2 oz)     Weight change:      INTAKE/OUPUT    Current Shift:      Last three shifts: 05/05 1901 - 05/07 0700  In: 340 [P.O.:340]  Out: 2000      LAB RESULTS     Recent Labs      05/07/17   0135  05/06/17   0222  05/05/17   0312   WBC  16.7*  16.7*  17.8*   HGB  8.9*  9.1*  8.1*   HCT  27.5*  27.2*  25.1*   PLT  203  174  140        Recent Labs      05/07/17   0135  05/06/17   0222  05/05/17   0312   NA  138  141  138   K  3.1*  3.3*  3.9   GLU  35*  120*  140*   BUN  11  25*  15   CREA  4.05*  6.79*  4.79*   CA  9.0  8.9  8.8       RECOMMENDATIONS AND DISCHARGE PLANNING     1. Pending tests/procedures/ Plan of Care or Other Needs: MRI     2. Discharge plan for patient and Needs/Barriers: self care     3. Estimated Discharge Date: 05/06/17 [facility] yes      4. The patient's care plan was reviewed with the oncoming nurse.        \"HEALS\" SAFETY CHECK      Fall Risk    Total Score: 2    Safety Measures: Safety Measures: Bed/Chair-Wheels locked, Bed in low position, Call light within reach, Fall prevention (comment), Gripper socks, Side rails X 3    A safety check occurred in the patient's room between off going nurse and oncoming nurse listed above.    The safety check included the below items  Area Items   H  High Alert Medications - Verify all high alert medication drips (heparin, PCA, etc.)   E  Equipment - Suction is set up for ALL patients (with wyatt)  - Red plugs utilized for all equipment (IV pumps, etc.)  - WOWs wiped down at end of shift.  - Room stocked with oxygen, suction, and other unit-specific supplies   A  Alarms - Bed alarm is set for fall risk patients  - Ensure chair alarm is in place and activated if patient is up in a chair   L  Lines - Check IV for any infiltration  - Mirza bag is empty if patient has a Mirza   - Tubing and IV bags are labeled   S  Safety   - Room is clean, patient is clean, and equipment is clean. - Hallways are clear from equipment besides carts. - Fall bracelet on for fall risk patients  - Ensure room is clear and free of clutter  - Suction is set up for ALL patients (with wyatt)  - Hallways are clear from equipment besides carts.    - Isolation precautions followed, supplies available outside room, sign posted     Benito Partida

## 2017-05-07 NOTE — PROGRESS NOTES
SUBJECTIVE:    Feeling better. Lost IV per nursing. No chest or abdominal pain. No cough. No N/V/D. Lives with mother. Wheelchair bound for 4 years. OBJECTIVE:    Visit Vitals    /75 (BP Patient Position: Supine)    Pulse 98    Temp 98.9 °F (37.2 °C)    Resp 18    Ht 5' 9\" (1.753 m)    Wt 64.4 kg (141 lb 14.4 oz)    SpO2 96%    Breastfeeding No    BMI 20.95 kg/m2     CVS: RRR  RS: CTA bilaterally, no wheezes  CVS: RRR  GI: NT, ND, BS +  CNS: moves both UE well. Follows commands  Extremities: no pedal edema. ASSESSMENT:    1. Acute metabolic encephalopathy due to sepsis, improved  2. E.coli UTI with sepsis  3. Fluid overload due to missed HD, resolved   4. Fever with immunocompromised status, improved  5. Hyponatremia due to fluid overload, resolved  6. Labile DM 2 with A1c 12. 3. Off insulin drip. 7. Loose BMs, resolved currently  8. ESRD  9. Anemia of ESRD  10. Chronic right basal ganglia and left thalamic old lacunar infarct. 11.Bilateral right greater than left mastoid air cells effusion/suggesting mastoiditis and middle ears effusion. 12. HTN  13. Lupus  14. Hypoxia due to fluid overload and Bibasilar atelectasis, resolved. 15. Leukocytosis  16. Pressure sore on coccygeal area  17. Hypertriglyceridemia   18. Right hepatic lobe lesion on CT - nothing acute on US  19. Low-attenuation in the upper pole the left kidney most likely a cyst.  20. Lytic lesion of the right iliac bone measuring 3.6 x 1.4 cm. 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 Decub wounds  @ left  inner buttocks and right  inner buttocks, POA. PLAN:    Cont current antibiotic per ID  Asked nurses to obtain IV access  Follow repeat blood c/s this am  Cont HD per nephrology  Reduce Lantus    Possible discharge next week if remains stable.   Will need SNF     --> total time greater than 30 minutes    CMP:   Lab Results   Component Value Date/Time     05/07/2017 01:35 AM    K 3.1 (L) 05/07/2017 01:35 AM     05/07/2017 01:35 AM    CO2 28 05/07/2017 01:35 AM    AGAP 10 05/07/2017 01:35 AM    GLU 35 (LL) 05/07/2017 01:35 AM    BUN 11 05/07/2017 01:35 AM    CREA 4.05 (H) 05/07/2017 01:35 AM    GFRAA 14 (L) 05/07/2017 01:35 AM    GFRNA 12 (L) 05/07/2017 01:35 AM    CA 9.0 05/07/2017 01:35 AM     CBC:   Lab Results   Component Value Date/Time    WBC 16.7 (H) 05/07/2017 01:35 AM    HGB 8.9 (L) 05/07/2017 01:35 AM    HCT 27.5 (L) 05/07/2017 01:35 AM     05/07/2017 01:35 AM     Recent Glucose Results:   Lab Results   Component Value Date/Time    GLU 35 (LL) 05/07/2017 01:35 AM

## 2017-05-07 NOTE — PROGRESS NOTES
Progress Note    Rodney Kelly  48 y.o. Admit Date: 5/2/2017  Active Problems:    UTI (urinary tract infection) (5/2/2017) POA: Unknown      Hyperglycemia (5/2/2017) POA: Unknown      Sepsis ([facility] (5/2/2017) POA: Unknown      Acute febrile illness (5/2/2017) POA: Unknown      ESRD on hemodialysis ([facility] (5/2/2017) POA: Unknown      Lupus (systemic lupus erythematosus) ([facility] (5/2/2017) POA: Unknown      DM type 2 (diabetes mellitus, type 2) ([facility] (5/3/2017) POA: Yes            Subjective:     Patient feels good, no more diarrhea. A comprehensive review of systems was negative except for that written in the History of Present Illness.     Objective:     Visit Vitals    /74 (BP 1 Location: Right arm, BP Patient Position: At rest)    Pulse 100    Temp 98.9 °F (37.2 °C)    Resp 17    Ht 5' 9\" (1.753 m)    Wt 67.4 kg (148 lb 11.2 oz)    SpO2 96%    Breastfeeding No    BMI 21.96 kg/m2         Intake/Output Summary (Last 24 hours) at 05/07/17 1424  Last data filed at 05/07/17 0300   Gross per 24 hour   Intake              100 ml   Output                0 ml   Net              100 ml       Current Facility-Administered Medications   Medication Dose Route Frequency Provider Last Rate Last Dose    insulin glargine (LANTUS) injection 15 Units  15 Units SubCUTAneous DAILY Mary Ann Martin MD   15 Units at 05/07/17 1039    GENTAMICIN INFORMATION NOTE   Other PRN Andreina Cruz MD        epoetin dwaine (EPOGEN;PROCRIT) injection 10,000 Units  10,000 Units IntraVENous DIALYSIS TUE, THU & SAT Tera Villareal MD   10,000 Units at 05/06/17 1129    predniSONE (DELTASONE) tablet 5 mg  5 mg Oral DAILY WITH BREAKFAST Tari Almanza MD   5 mg at 05/07/17 1041    insulin lispro (HUMALOG) injection   SubCUTAneous AC&HS Mary Ann Martin MD   6 Units at 05/07/17 1230    Lactobacillus Acidoph & Bulgar [facility]) tablet 2 Tab  2 Tab Oral BID Mary Ann Martin MD   2 Tab at 05/07/17 1041    aspirin delayed-release tablet 81 mg  81 mg Oral DAILY Lida Malik MD   81 mg at 05/07/17 1041    pravastatin (PRAVACHOL) tablet 20 mg  20 mg Oral QHS Lida Malik MD   20 mg at 05/06/17 2209    fluticasone (FLONASE) 50 mcg/actuation nasal spray 2 Spray  2 Spray Both Nostrils DAILY Lida Malik MD   2 [place] at 05/05/17 0900    sodium chloride (NS) flush 5-10 mL  5-10 mL IntraVENous PRN Ramon Ramsey MD   10 mL at 05/06/17 2214    doxercalciferol (HECTOROL) 4 mcg/2 mL injection 1 mcg  1 mcg IntraVENous DIALYSIS ONCE Winter Daniel MD   1 mcg at 05/06/17 1129    glucose chewable tablet 16 g  4 Tab Oral PRN Ramon Ramsey MD        glucagon [facility]) injection 1 mg  1 mg IntraMUSCular PRN Ramon Ramsey MD        dextrose (D50W) injection syrg 12.5-25 g  25-50 mL IntraVENous PRN Ramon Ramsey MD        acetaminophen (TYLENOL) tablet 650 mg  650 mg Oral Q4H PRN Opal Dc MD   650 mg at 05/04/17 1055    diphenhydrAMINE (BENADRYL) capsule 25 mg  25 mg Oral Q4H PRN Opal Dc MD        ondansetron [facility]) injection 4 mg  4 mg IntraVENous Q4H PRN Opal Dc MD   4 mg at 05/03/17 1318    bisacodyl (DULCOLAX) tablet 10 mg  10 mg Oral DAILY PRN Opal Dc MD        heparin (porcine) injection 5,000 Units  5,000 Units SubCUTAneous Q12H Opal Dc MD   5,000 Units at 05/07/17 0526    cinacalcet (SENSIPAR) tablet 30 mg  30 mg Oral DAILY WITH BREAKFAST Winter Daniel MD   30 mg at 05/07/17 1041    sevelamer carbonate (RENVELA) tab 1,600 mg  1,600 mg Oral TID WITH MEALS Winter Daniel MD   Stopped at 05/07/17 1200    B complex-vitaminC-folic acid (NEPHROCAP) cap  1 Cap Oral DAILY Winter Daniel MD   1 Cap at 05/07/17 1041        Physical Exam:     Physical Exam:   General:  Alert, cooperative, no distress, appears stated age. Neck: Supple, symmetrical, trachea midline, no adenopathy, thyroid: no enlargement/tenderness/nodules, no carotid bruit and no JVD.    Lungs:   Clear to auscultation bilaterally. Heart:  Regular rate and rhythm, S1, S2 normal, no murmur, click, rub or gallop. Abdomen:   Soft, non-tender. Bowel sounds normal. No masses,  No organomegaly. Extremities: Extremities normal, atraumatic, no cyanosis or edema, AVF has good thrill & bruit. Data Review:    CBC w/Diff    Recent Labs      05/07/17 0135 05/06/17 0222 05/05/17   0312   WBC  16.7*  16.7*  17.8*   RBC  2.91*  2.92*  2.66*   HGB  8.9*  9.1*  8.1*   HCT  27.5*  27.2*  25.1*   MCV  94.5  93.2  94.4   MCH  30.6  31.2  30.5   MCHC  32.4  33.5  32.3   RDW  13.3  13.2  13.4    Recent Labs      05/07/17 0135 05/06/17 0222 05/05/17   0312   BANDS   --   2  5   MONOS  2  4  6   EOS  3  2  0   BASOS  1  0  0   RDW  13.3  13.2  13.4        Comprehensive Metabolic Profile    Recent Labs      05/07/17 0135 05/06/17 0222 05/05/17   0312   NA  138  141  138   K  3.1*  3.3*  3.9   CL  100  102  104   CO2  28  27  24   BUN  11  25*  15   CREA  4.05*  6.79*  4.79*    Recent Labs      05/07/17 0135 05/06/17 0222 05/05/17   0312   CA  9.0  8.9  8.8   PHOS   --   4.1   --         Last Blood culture so far  negative               Impression:       Active Hospital Problems    Diagnosis Date Noted    [facility] 05/03/2017    UTI (urinary tract infection) 05/02/2017    Hyperglycemia 05/02/2017    [facility]eps[facility] 05/02/2017    Acute febrile illness 05/02/2017    [facility] 05/02/2017    Lupus (systemic lupus erythematosus) ([facility] 05/02/2017            Plan:     Continue current antibiotics, was dialyzed yesterday, DC when Blood culture final report is negative. Aguilar Frazier MD

## 2017-05-07 NOTE — ROUTINE PROCESS
Attempted x2 to place saline lock without success  Patient refuses any further attempts by this nurse Shikha Day shift nurse notified.   Spoke to Dr. Jp Brown regarding iv status he stated to put iv back in

## 2017-05-08 LAB
ANION GAP BLD CALC-SCNC: 10 MMOL/L (ref 3–18)
BASOPHILS # BLD AUTO: 0 K/UL (ref 0–0.06)
BASOPHILS # BLD: 0 % (ref 0–3)
BUN SERPL-MCNC: 18 MG/DL (ref 7–18)
BUN/CREAT SERPL: 3 (ref 12–20)
CALCIUM SERPL-MCNC: 8.6 MG/DL (ref 8.5–10.1)
CHLORIDE SERPL-SCNC: 99 MMOL/L (ref 100–108)
CO2 SERPL-SCNC: 27 MMOL/L (ref 21–32)
CREAT SERPL-MCNC: 6.5 MG/DL (ref 0.6–1.3)
DIFFERENTIAL METHOD BLD: ABNORMAL
EOSINOPHIL # BLD: 0.8 K/UL (ref 0–0.4)
EOSINOPHIL NFR BLD: 5 % (ref 0–5)
ERYTHROCYTE [DISTWIDTH] IN BLOOD BY AUTOMATED COUNT: 13.1 % (ref 11.6–14.5)
GLUCOSE BLD STRIP.AUTO-MCNC: 136 MG/DL (ref 70–110)
GLUCOSE BLD STRIP.AUTO-MCNC: 246 MG/DL (ref 70–110)
GLUCOSE BLD STRIP.AUTO-MCNC: 266 MG/DL (ref 70–110)
GLUCOSE SERPL-MCNC: 124 MG/DL (ref 74–99)
HCT VFR BLD AUTO: 25.7 % (ref 35–45)
HGB BLD-MCNC: 8.2 G/DL (ref 12–16)
LYMPHOCYTES # BLD AUTO: 18 % (ref 20–51)
LYMPHOCYTES # BLD: 2.7 K/UL (ref 0.8–3.5)
MCH RBC QN AUTO: 30.6 PG (ref 24–34)
MCHC RBC AUTO-ENTMCNC: 31.9 G/DL (ref 31–37)
MCV RBC AUTO: 95.9 FL (ref 74–97)
MONOCYTES # BLD: 1.1 K/UL (ref 0–1)
MONOCYTES NFR BLD AUTO: 7 % (ref 2–9)
NEUTS BAND NFR BLD MANUAL: 1 % (ref 0–5)
NEUTS SEG # BLD: 10.6 K/UL (ref 1.8–8)
NEUTS SEG NFR BLD AUTO: 69 % (ref 42–75)
PLATELET # BLD AUTO: 222 K/UL (ref 135–420)
PLATELET COMMENTS,PCOM: ABNORMAL
PMV BLD AUTO: 10.7 FL (ref 9.2–11.8)
POTASSIUM SERPL-SCNC: 3.2 MMOL/L (ref 3.5–5.5)
RBC # BLD AUTO: 2.68 M/UL (ref 4.2–5.3)
RBC MORPH BLD: ABNORMAL
RBC MORPH BLD: ABNORMAL
SODIUM SERPL-SCNC: 136 MMOL/L (ref 136–145)
WBC # BLD AUTO: 15.2 K/UL (ref 4.6–13.2)

## 2017-05-08 PROCEDURE — 80048 BASIC METABOLIC PNL TOTAL CA: CPT | Performed by: INTERNAL MEDICINE

## 2017-05-08 PROCEDURE — 65660000000 HC RM CCU STEPDOWN

## 2017-05-08 PROCEDURE — 36415 COLL VENOUS BLD VENIPUNCTURE: CPT | Performed by: INTERNAL MEDICINE

## 2017-05-08 PROCEDURE — 97530 THERAPEUTIC ACTIVITIES: CPT

## 2017-05-08 PROCEDURE — A6209 FOAM DRSG <=16 SQ IN W/O BDR: HCPCS

## 2017-05-08 PROCEDURE — 74011250637 HC RX REV CODE- 250/637: Performed by: INTERNAL MEDICINE

## 2017-05-08 PROCEDURE — 74011636637 HC RX REV CODE- 636/637: Performed by: INTERNAL MEDICINE

## 2017-05-08 PROCEDURE — 74011636637 HC RX REV CODE- 636/637: Performed by: FAMILY MEDICINE

## 2017-05-08 PROCEDURE — 82962 GLUCOSE BLOOD TEST: CPT

## 2017-05-08 PROCEDURE — A6213 FOAM DRG >16<=48 SQ IN W/BDR: HCPCS

## 2017-05-08 PROCEDURE — 85025 COMPLETE CBC W/AUTO DIFF WBC: CPT | Performed by: INTERNAL MEDICINE

## 2017-05-08 PROCEDURE — 74011250636 HC RX REV CODE- 250/636: Performed by: FAMILY MEDICINE

## 2017-05-08 RX ADMIN — INSULIN GLARGINE 15 UNITS: 100 INJECTION, SOLUTION SUBCUTANEOUS at 09:00

## 2017-05-08 RX ADMIN — INSULIN LISPRO 9 UNITS: 100 INJECTION, SOLUTION INTRAVENOUS; SUBCUTANEOUS at 16:30

## 2017-05-08 RX ADMIN — SEVELAMER CARBONATE 1600 MG: 800 TABLET, FILM COATED ORAL at 17:00

## 2017-05-08 RX ADMIN — INSULIN LISPRO 6 UNITS: 100 INJECTION, SOLUTION INTRAVENOUS; SUBCUTANEOUS at 23:43

## 2017-05-08 RX ADMIN — PREDNISONE 5 MG: 5 TABLET ORAL at 08:00

## 2017-05-08 RX ADMIN — SEVELAMER CARBONATE 1600 MG: 800 TABLET, FILM COATED ORAL at 12:00

## 2017-05-08 RX ADMIN — ASPIRIN 81 MG: 81 TABLET, COATED ORAL at 09:00

## 2017-05-08 RX ADMIN — HEPARIN SODIUM 5000 UNITS: 5000 INJECTION, SOLUTION INTRAVENOUS; SUBCUTANEOUS at 18:00

## 2017-05-08 RX ADMIN — SEVELAMER CARBONATE 1600 MG: 800 TABLET, FILM COATED ORAL at 08:00

## 2017-05-08 RX ADMIN — NEPHROCAP 1 CAPSULE: 1 CAP ORAL at 09:00

## 2017-05-08 RX ADMIN — LACTOBACILLUS TAB 2 TABLET: TAB at 18:00

## 2017-05-08 RX ADMIN — CINACALCET HYDROCHLORIDE 30 MG: 30 TABLET, COATED ORAL at 08:00

## 2017-05-08 RX ADMIN — Medication 10 ML: at 04:09

## 2017-05-08 RX ADMIN — HEPARIN SODIUM 5000 UNITS: 5000 INJECTION, SOLUTION INTRAVENOUS; SUBCUTANEOUS at 04:09

## 2017-05-08 RX ADMIN — PRAVASTATIN SODIUM 20 MG: 20 TABLET ORAL at 23:43

## 2017-05-08 RX ADMIN — LACTOBACILLUS TAB 2 TABLET: TAB at 09:00

## 2017-05-08 NOTE — ROUTINE PROCESS
Bedside and Verbal shift change report given to Ana BANKS  (oncoming nurse) by Saadia Ibarra (offgoing nurse). Report included the following information SBAR, Kardex, MAR and Recent Results.     SITUATION:    Code Status: Full Code  Reason for Admission: Sepsis ([facility]  Hyperglycemia  Acute febrile illness  UTI (urinary tract infection)   ESRD on hemodialysis [facility])    [facility] day: 6   Problem List:       Hospital Problems  Date Reviewed: 5/3/2017          Codes Class Noted POA    DM type 2 (diabetes mellitus, type 2) ([facility] ICD-10-CM: E11.9  ICD-9-CM: 250.00  5/3/2017 Yes        UTI (urinary tract infection) ICD-10-CM: N39.0  ICD-9-CM: 599.0  5/2/2017 Unknown        Hyperglycemia ICD-10-CM: R73.9  ICD-9-CM: 790.29  5/2/2017 Unknown        Sepsis ([facility] ICD-10-CM: A41.9  ICD-9-CM: 038.9, 995.91  5/2/2017 Unknown        Acute febrile illness ICD-10-CM: R50.9  ICD-9-CM: 780.60  5/2/2017 Unknown        ESRD on hemodialysis [facility]) ICD-10-CM: N18.6, Z99.2  ICD-9-CM: 585.6, V45.11  5/2/2017 Unknown        Lupus (systemic lupus erythematosus) ([facility] ICD-10-CM: M32.9  ICD-9-CM: 710.0  5/2/2017 Unknown              BACKGROUND:    Past Medical History:   Past Medical History:   Diagnosis Date    CAD (coronary artery disease)     Diabetes ([facility]     ESRD (end stage renal disease) on dialysis ([facility]     Myrtis Areas, WEDNESDAY, FRIDAY    Hypertension     Lupus ([facility]     Stroke ([facility]          Patient taking anticoagulants yes     ASSESSMENT:    Changes in Assessment Throughout Shift: Stable     Patient has Central Line: no Reasons if yes:    Patient has Mirza Cath: no Reasons if yes:       Last Vitals:     Vitals:    05/07/17 1604 05/07/17 2017 05/07/17 2312 05/08/17 0316   BP: 137/70 133/75 127/69 137/77   Pulse: 98 (!) 101 97 95   Resp: 18 20 20 20   Temp: 98.4 °F (36.9 °C) 100 °F (37.8 °C) 99.5 °F (37.5 °C) 98.8 °F (37.1 °C)   TempSrc:       SpO2: 92% 93% 95% 96%   Weight:    66.6 kg (146 lb 12.8 oz)   Height:            IV and DRAINS (will only show if present)   [REMOVED] Peripheral IV 05/02/17 Right Antecubital-Site Assessment: Clean, dry, & intact  [REMOVED] Peripheral IV 05/03/17 Right Arm-Site Assessment: Clean, dry, & intact     WOUND (if present)   Wound Type:  Stage 2 pressure ulcer present on admission   Dressing present Dressing Present : No   Wound Concerns/Notes:  none     PAIN    Pain Assessment    Pain Intensity 1: 0 (05/08/17 0231)              Patient Stated Pain Goal: 0  o Interventions for Pain:  none  o Intervention effective:   o Time of last intervention  o Reassessment Completed: yes      Last 3 Weights:  Last 3 Recorded Weights in this Encounter    05/06/17 0500 05/07/17 0718 05/08/17 0316   Weight: 64.4 kg (141 lb 14.4 oz) 67.4 kg (148 lb 11.2 oz) 66.6 kg (146 lb 12.8 oz)     Weight change:      INTAKE/OUPUT    Current Shift:      Last three shifts: 05/06 0701 - 05/07 1900  In: 100 [P.O.:100]  Out: 2000      LAB RESULTS     Recent Labs      05/08/17   0333  05/07/17   0135  05/06/17   0222   WBC  15.2*  16.7*  16.7*   HGB  8.2*  8.9*  9.1*   HCT  25.7*  27.5*  27.2*   PLT  222  203  174        Recent Labs      05/08/17   0333  05/07/17   0135  05/06/17   0222   NA  136  138  141   K  3.2*  3.1*  3.3*   GLU  124*  35*  120*   BUN  18  11  25*   CREA  6.50*  4.05*  6.79*   CA  8.6  9.0  8.9       RECOMMENDATIONS AND DISCHARGE PLANNING     1. Pending tests/procedures/ Plan of Care or Other Needs: MRI     2. Discharge plan for patient and Needs/Barriers: self care     3. Estimated Discharge Date: 05/06/17 Posted on Whiteboard in [address] [place] [address] Room: yes      4. The patient's care plan was reviewed with the oncoming nurse.        \"HEALS\" SAFETY CHECK      Fall Risk    Total Score: 2    Safety Measures: Safety Measures: Bed/Chair-Wheels locked, Bed in low position, Call light within reach, Fall prevention (comment), Side rails X2    A safety check occurred in the patient's room between off going nurse and oncoming nurse listed above. The safety check included the below items  Area Items   H  High Alert Medications - Verify all high alert medication drips (heparin, PCA, etc.)   E  Equipment - Suction is set up for ALL patients (with wyatt)  - Red plugs utilized for all equipment (IV pumps, etc.)  - WOWs wiped down at end of shift.  - Room stocked with oxygen, suction, and other unit-specific supplies   A  Alarms - Bed alarm is set for fall risk patients  - Ensure chair alarm is in place and activated if patient is up in a chair   L  Lines - Check IV for any infiltration  - Mirza bag is empty if patient has a Mirza   - Tubing and IV bags are labeled   S  Safety   - Room is clean, patient is clean, and equipment is clean. - Hallways are clear from equipment besides carts. - Fall bracelet on for fall risk patients  - Ensure room is clear and free of clutter  - Suction is set up for ALL patients (with wyatt)  - Hallways are clear from equipment besides carts.    - Isolation precautions followed, supplies available outside room, sign posted     Joanne Middleton

## 2017-05-08 NOTE — PROGRESS NOTES
Infectious Disease Progress Note    Requested by: Dr. Chayo crystal    Reason: sepsis, gram negative bacteremia    Current abx Prior abx   Gentamicin since 5/5 Vancomycin 5/3  Piperacillin/tazobactam  5/3-5/5     Lines:       Assessment :    48 y.o.  female with history of type 2 DM (uncontrolled), lupus, ESRD on HD who presented to the ED on 5/2/17 for evaluation of SOB. Clinical picture consistent with sepsis (POA) due to e.coli bloodstream infection. Bloodstream infection likely due to e.coli cystitis. However, blood cultures 5/4 still positive. Delayed clearance could be due to concurrent immunocompromised state. Will repeat blood cultures to determine time of clearance of bacteremia. Ct abdomen/pelvis didn't reveal any obvious inflammation/infection. No vegetations on transthoracic echo. No erythema over LUE fistula site. Diarrhea on admission could be due to cellcept. No further diarrhea. Clinically better    Recommendations:    1. continue gentamicin  2. F/u blood cultures 5/7  3. Ok to d/c in am if repeat blood cultures 5/7 remain negative    Above plan was discussed in details with patient. Will d/w dr Mitzi Kelly. Please call me if any further questions or concerns. Will continue to participate in the care of this patient. subjective:    Feels better. Patient denies headaches, visual disturbances, sore throat, runny nose, earaches, cp, sob, chills, cough, abdominal pain, diarrhea, burning micturition, pain or weakness in extremities. she denies flank pain. Patient's home Medications    PREDNISONE (DELTASONE) 5 MG TABLET    Take 5 mg by mouth.        Current Facility-Administered Medications   Medication Dose Route Frequency    insulin glargine (LANTUS) injection 15 Units  15 Units SubCUTAneous DAILY    GENTAMICIN INFORMATION NOTE   Other PRN    epoetin dwaine (EPOGEN;PROCRIT) injection 10,000 Units  10,000 Units IntraVENous DIALYSIS TUE, THU & SAT    predniSONE (DELTASONE) tablet 5 mg  5 mg Oral DAILY WITH BREAKFAST    insulin lispro (HUMALOG) injection   SubCUTAneous AC&HS    Lactobacillus Acidoph & [facility]) tablet 2 Tab  2 Tab Oral BID    aspirin delayed-release tablet 81 mg  81 mg Oral DAILY    pravastatin (PRAVACHOL) tablet 20 mg  20 mg Oral QHS    sodium chloride (NS) flush 5-10 mL  5-10 mL IntraVENous PRN    doxercalciferol (HECTOROL) 4 mcg/2 mL injection 1 mcg  1 mcg IntraVENous DIALYSIS ONCE    glucose chewable tablet 16 g  4 Tab Oral PRN    glucagon (GLUCAGEN) injection 1 mg  1 mg IntraMUSCular PRN    dextrose (D50W) injection syrg 12.5-25 g  25-50 mL IntraVENous PRN    acetaminophen (TYLENOL) tablet 650 mg  650 mg Oral Q4H PRN    diphenhydrAMINE (BENADRYL) capsule 25 mg  25 mg Oral Q4H PRN    ondansetron (ZOFRAN) injection 4 mg  4 mg IntraVENous Q4H PRN    bisacodyl (DULCOLAX) tablet 10 mg  10 mg Oral DAILY PRN    heparin (porcine) injection 5,000 Units  5,000 Units SubCUTAneous Q12H    cinacalcet (SENSIPAR) tablet 30 mg  30 mg Oral DAILY WITH BREAKFAST    sevelamer carbonate (RENVELA) tab 1,600 mg  1,600 mg Oral TID WITH MEALS    B complex-vitaminC-folic acid (NEPHROCAP) cap  1 Cap Oral DAILY       Allergies: Sulfa (sulfonamide antibiotics)    Temp (24hrs), Av.9 °F (37.2 °C), Min:98.1 °F (36.7 °C), Max:100 °F (37.8 °C)    Visit Vitals    /74 (BP 1 Location: Right arm, BP Patient Position: At rest)    Pulse 91    Temp 98.7 °F (37.1 °C)    Resp 20    Ht 5' 9\" (1.753 m)    Wt 66.6 kg (146 lb 12.8 oz)    SpO2 93%    Breastfeeding No    BMI 21.68 kg/m2       ROS: 12 point ROS obtained in details. Pertinent positives as mentioned in HPI,   otherwise negative    Physical Exam:    General: Well developed, well nourished female laying on the bed AAOx3 in no acute distress.     General:   awake alert and oriented   HEENT:  Normocephalic, atraumatic, PERRL, EOMI, no scleral icterus or pallor; no conjunctival hemmohage;  nasal and oral mucous are moist and without evidence of lesions. No thrush. Neck supple, no bruits. Lymph Nodes:   no cervical, axillary or inguinal adenopathy   Lungs:   non-labored, bilaterally clear to auscultation- no crackles wheezes rales or rhonchi   Heart:  RRR, s1 and s2;  rubs or gallops, no edema, + pedal pulses   Abdomen:  soft, non-distended, active bowel sounds, no hepatomegaly, no splenomegaly. Non-tender   Genitourinary:  deferred   Extremities:   no clubbing, cyanosis; no joint effusions or swelling;  Full ROM of all large joints to the upper and lower extremities; muscle mass appropriate for age   Neurologic:  No gross focal motor or sensory deficits                        Skin:  No rash or ulcers noted   Back:  no spinal or paraspinal muscle tenderness or rigidity, no CVA tenderness     Psychiatric:  No suicidal or homicidal ideations, appropriate mood and affect         Labs: Results:   Chemistry Recent Labs      05/08/17 0333 05/07/17 0135 05/06/17 0222   GLU  124*  35*  120*   NA  136  138  141   K  3.2*  3.1*  3.3*   CL  99*  100  102   CO2  27  28  27   BUN  18  11  25*   CREA  6.50*  4.05*  6.79*   CA  8.6  9.0  8.9   AGAP  10  10  12   BUCR  3*  3*  4*      CBC w/Diff Recent Labs      05/08/17 0333 05/07/17 0135 05/06/17   0222   WBC  15.2*  16.7*  16.7*   RBC  2.68*  2.91*  2.92*   HGB  8.2*  8.9*  9.1*   HCT  25.7*  27.5*  27.2*   PLT  222  203  174   GRANS  69  82*  77*   LYMPH  18*  12*  15*   EOS  5  3  2      Microbiology Recent Labs      05/07/17   0348  05/07/17   0338   CULT  NO GROWTH 1 DAY  NO GROWTH 1 DAY          RADIOLOGY:    All available imaging studies/reports in [facility] for this admission were reviewed    Dr. Sudha Lauren, Infectious Disease Specialist  [phone]  May 8, 2017  2:47 PM

## 2017-05-08 NOTE — H&P
[address] [facility]  ROUTINE H AND PS    Name:  Vicky Whitaker  MR#:  [id number]  :  1970  Account #:  [de-identified]  Date of Adm:  2017      CHIEF COMPLAINT: Shortness of breath and uncontrolled diabetes. HISTORY OF PRESENT ILLNESS: This is a 53-year-old black female  with history of end-stage renal disease, on dialysis x5 years, history of  lupus, history of insulin-dependent diabetes mellitus, is admitted  because of acute shortness of breath. She tells me she missed 1  dialysis day. On admission, she was hypoxic with a saturation level in  the 80s. Her blood sugar was around 600. The patient was therefore  admitted for close monitoring. She was admitted to step-down. PAST MEDICAL HISTORY: History of stroke at age 22 and age 30,  probably related to SLE. Long history of SLE. Last menstrual period  was when she was age 20. Denied hysterectomy. She did have   section. She tells me she was very sick many years ago  when she woke up, her hands were in claw hands. CT of the head  showed chronic right basilar ganglia and left thalamic old infarcts. FAMILY HISTORY: Mother is alive and well. Father , cause  of death is unknown. SOCIAL HISTORY: Denies smoking or drinking. Denies use of drugs. REVIEW OF SYSTEMS  HEENT: No headache. No visual or hearing problems. CARDIOVASCULAR/RESPIRATORY: Short of breath on admission,  better at this time. No chest pain. GASTROINTESTINAL: No complaint. GENITOURINARY: She still makes urine. MUSCULOSKELETAL: She is able to get around, occasionally uses  walker, otherwise ambulatory. PHYSICAL EXAMINATION  GENERAL: She is obese, alert, coherent, in no acute distress. VITAL SIGNS: Blood pressure is 135/74, pulse 91, temperature 98,  saturation 93. HEAD: Normocephalic. EYES: Pupils equal. Eye ocular movements intact. NOSE: No septal deviation. MOUTH: Normal tongue, gums, buccal mucosa. NECK: Thyroid enlarged.  Trachea is midline. CHEST: Symmetrical. No breast masses. HEART: Regular. No murmur. LUNGS: Clear. ABDOMEN: Soft, nontender. EXTREMITIES: Revealed claw hand deformity of both hands, either  arthritis or Dupuytren contracture. LABORATORY DATA: Chest x-ray showed no acute process and  cardiomegaly. BUN is 18, creatinine 6.5, potassium 3.2. Urine showed  numerous WBCs. IMPRESSION  1. Acute congestive heart failure. 2. End-stage renal disease. 3. Lupus or SLE. 4. Insulin-dependent diabetes mellitus, poorly controlled. 5. Chronic steroid therapy. DISCUSSION  1. The patient appears to be doing well, possible discharge in the  morning. 2. Continue current medications which includes prednisone 5 mg daily,  gentamicin IV, Lantus 15 units, aspirin, Nephrocaps, Sensipar,  Hectorol. 3. Physical Therapy has been consulted and currently seeing patient.         MD CHAD Davison / JEANNA  D:  05/08/2017   14:57  T:  05/08/2017   16:04  Job #:  [id number]

## 2017-05-08 NOTE — PROGRESS NOTES
Problem: Mobility Impaired (Adult and Pediatric)  Goal: *Acute Goals and Plan of Care (Insert Text)  Physical Therapy Goals  Initiated 5/5/2017 and to be accomplished within 7 day(s)  1. Patient will move from supine to sit and sit to supine in bed with supervision/set-up. 2. Patient will transfer from bed to chair and chair to bed with minimal assistance/contact guard assist using the least restrictive device. 3. Patient will perform sit to stand with minimal assistance/contact guard assist.  4. Patient will roll bilaterally modified independently for repositioning. 5. Patient will ambulate 5 with moderate assist and least restrictive AD. PHYSICAL THERAPY TREATMENT     Patient: Zenaida Cisneros (7 y.o. female)  Date: 5/8/2017  Diagnosis: Sepsis ([facility]  Hyperglycemia  Acute febrile illness  UTI (urinary tract infection)  ESRD on hemodialysis ([facility] <principal problem not specified>       Precautions: Fall  Chart, physical therapy assessment, plan of care and goals were reviewed. ASSESSMENT:  Pt is compliant and receptive throughout post education regarding POC. Increased functional transfer proficiency noted as pt req's significantly decreased assist throughout. Progression toward goals:  [ ]      Improving appropriately and progressing toward goals  [ ]      Improving slowly and progressing toward goals  [ ]      Not making progress toward goals and plan of care will be adjusted       PLAN:  Patient continues to benefit from skilled intervention to address the above impairments. Continue treatment per established plan of care. Discharge Recommendations:  Home Health 24/7 assist  Further Equipment Recommendations for Discharge:  N/A       SUBJECTIVE:   Patient stated I can't do PT anymore. My doctor told me I will never walk again anyway.   Pt req's some education regarding purpose of inpatient PT and established goals.       OBJECTIVE DATA SUMMARY:   Critical Behavior:  Neurologic State: Drowsy  Orientation Level: Oriented X4  Cognition: Follows commands  Safety/Judgement: Awareness of environment, Fall prevention  Functional Mobility Training:  Bed Mobility:  Rolling: Stand-by asssistance  Supine to Sit: Minimum assistance  Sit to Supine: Contact guard assistance  Scooting: Stand-by asssistance (bilateral rail use)  Transfers:  Sit to Stand: Minimum assistance (x's 5 consecutive reps)  Stand to Sit: Minimum assistance  Bed to Chair:  (pt defers)  Balance:  Sitting: Impaired; Without support  Sitting - Static: Good (unsupported)  Sitting - Dynamic: Fair (occasional)  Standing: Impaired; With support  Standing - Static: Fair  Pain:  Pt reports 0/10 pain or discomfort prior to treatment. Pt reports 0/10 pain or discomfort post treatment. Activity Tolerance:   Fair  Please refer to the flowsheet for vital signs taken during this treatment.   After treatment:   [ ] Patient left in no apparent distress sitting up in chair  [X] Patient left in no apparent distress in bed  [X] Call bell left within reach  [ ] Nursing notified  [ ] Caregiver present  [ ] Bed alarm activated      Jayesh Hanson PTA   Time Calculation: 24 mins

## 2017-05-08 NOTE — PROGRESS NOTES
Progress Note    Evy Stephenson  48 y.o. Admit Date: 5/2/2017  Active Problems:    UTI (urinary tract infection) (5/2/2017) POA: Unknown      Hyperglycemia (5/2/2017) POA: Unknown      Sepsis ([facility] (5/2/2017) POA: Unknown      Acute febrile illness (5/2/2017) POA: Unknown      ESRD on hemodialysis ([facility] (5/2/2017) POA: Unknown      Lupus (systemic lupus erythematosus) ([facility] (5/2/2017) POA: Unknown      DM type 2 (diabetes mellitus, type 2) ([facility] (5/3/2017) POA: Yes            Subjective:     Patient feels good, no SOB. A comprehensive review of systems was negative except for that written in the History of Present Illness.     Objective:     Visit Vitals    /74 (BP 1 Location: Right arm, BP Patient Position: At rest)    Pulse 91    Temp 98.7 °F (37.1 °C)    Resp 20    Ht 5' 9\" (1.753 m)    Wt 66.6 kg (146 lb 12.8 oz)    SpO2 93%    Breastfeeding No    BMI 21.68 kg/m2       No intake or output data in the 24 hours ending 05/08/17 1319    Current Facility-Administered Medications   Medication Dose Route Frequency Provider Last Rate Last Dose    insulin glargine (LANTUS) injection 15 Units  15 Units SubCUTAneous DAILY Brianne Arndt MD   15 Units at 05/07/17 1039    GENTAMICIN INFORMATION NOTE   Other PRN Shanon Sagastume MD        epoetin dwaine (EPOGEN;PROCRIT) injection 10,000 Units  10,000 Units IntraVENous DIALYSIS MILEY MAE & SAT Flash uRst MD   10,000 Units at 05/06/17 1129    predniSONE (DELTASONE) tablet 5 mg  5 mg Oral DAILY WITH BREAKFAST Benoit Carballo MD   5 mg at 05/07/17 1041    insulin lispro (HUMALOG) injection   SubCUTAneous AC&HS Brianne Arndt MD   Stopped at 05/08/17 0730    Lactobacillus Acidoph & [facility]) tablet 2 Tab  2 Tab Oral BID Brianne Arndt MD   2 Tab at 05/07/17 1738    aspirin delayed-release tablet 81 mg  81 mg Oral DAILY Brianne Arndt MD   81 mg at 05/07/17 1041    pravastatin (PRAVACHOL) tablet 20 mg  20 mg Oral QHS Jesusrey Lujan, MD   20 mg at 05/07/17 2126    sodium chloride (NS) flush 5-10 mL  5-10 mL IntraVENous PRN Mane Brady MD   10 mL at 05/08/17 0409    doxercalciferol (HECTOROL) 4 mcg/2 mL injection 1 mcg  1 mcg IntraVENous DIALYSIS ONCE Zhang Mcdonald MD   1 mcg at 05/06/17 1129    glucose chewable tablet 16 g  4 Tab Oral PRN Mane Brady MD        glucagon [facility]) injection 1 mg  1 mg IntraMUSCular PRN Mane Brady MD        dextrose (D50W) injection syrg 12.5-25 g  25-50 mL IntraVENous PRN Mane Brady MD        acetaminophen (TYLENOL) tablet 650 mg  650 mg Oral Q4H PRN Delonte Galaviz MD   650 mg at 05/04/17 1055    diphenhydrAMINE (BENADRYL) capsule 25 mg  25 mg Oral Q4H PRN Delonte Galaviz MD        ondansetron [facility]) injection 4 mg  4 mg IntraVENous Q4H PRN Delonte Galaviz MD   4 mg at 05/03/17 1318    bisacodyl (DULCOLAX) tablet 10 mg  10 mg Oral DAILY PRN Delonte Galaviz MD        heparin (porcine) injection 5,000 Units  5,000 Units SubCUTAneous Q12H Delonte Galaviz MD   5,000 Units at 05/08/17 0409    cinacalcet (SENSIPAR) tablet 30 mg  30 mg Oral DAILY WITH BREAKFAST Zhang Mcdonald MD   30 mg at 05/07/17 1041    sevelamer carbonate (RENVELA) tab 1,600 mg  1,600 mg Oral TID WITH MEALS Zhang Mcdonald MD   1,600 mg at 05/07/17 1739    B complex-vitaminC-folic acid (NEPHROCAP) cap  1 Cap Oral DAILY Zhang Mcdonald MD   1 Cap at 05/07/17 1041        Physical Exam:     Physical Exam:   General:  Alert, cooperative, no distress, appears stated age. Lungs:   Clear to auscultation bilaterally. Heart:  Regular rate and rhythm, S1, S2 normal, no murmur, click, rub or gallop. Abdomen:   Soft, non-tender. Bowel sounds normal. No masses,  No organomegaly. Extremities: Extremities normal, atraumatic, no cyanosis or edema, AVF has good thrill & bruit. .   Skin: Skin color, texture, turgor normal. No rashes or lesions         Data Review:    CBC w/Diff    Recent Labs      05/08/17   0333  05/07/17   0135 05/06/17 0222   WBC  15.2*  16.7*  16.7*   RBC  2.68*  2.91*  2.92*   HGB  8.2*  8.9*  9.1*   HCT  25.7*  27.5*  27.2*   MCV  95.9  94.5  93.2   MCH  30.6  30.6  31.2   MCHC  31.9  32.4  33.5   RDW  13.1  13.3  13.2    Recent Labs      05/08/17 0333 05/07/17 0135 05/06/17 0222   BANDS  1   --   2   MONOS  7  2  4   EOS  5  3  2   BASOS  0  1  0   RDW  13.1  13.3  13.2        Comprehensive Metabolic Profile    Recent Labs      05/08/17 0333 05/07/17 0135 05/06/17 0222   NA  136  138  141   K  3.2*  3.1*  3.3*   CL  99*  100  102   CO2  27  28  27   BUN  18  11  25*   CREA  6.50*  4.05*  6.79*    Recent Labs      05/08/17 0333 05/07/17 0135 05/06/17 0222   CA  8.6  9.0  8.9   PHOS   --    --   4.1                        Impression:       Active Hospital Problems    Diagnosis Date Noted    DM type 2 (diabetes mellitus, type 2) ([facility] 05/03/2017    UTI (urinary tract infection) 05/02/2017    Hyperglycemia 05/02/2017    Sepsis ([facility] 05/02/2017    Acute febrile illness 05/02/2017    ESRD on hemodialysis ([facility] 05/02/2017    Lupus (systemic lupus erythematosus) ([facility] 05/02/2017            Plan:     Continue antibiotics, Dialysis tomorrow.       Abdoul Dias MD

## 2017-05-09 VITALS
HEART RATE: 108 BPM | RESPIRATION RATE: 22 BRPM | TEMPERATURE: 100.4 F | SYSTOLIC BLOOD PRESSURE: 187 MMHG | WEIGHT: 145.5 LBS | BODY MASS INDEX: 21.55 KG/M2 | OXYGEN SATURATION: 98 % | HEIGHT: 69 IN | DIASTOLIC BLOOD PRESSURE: 83 MMHG

## 2017-05-09 PROBLEM — I50.31 ACUTE DIASTOLIC CHF (CONGESTIVE HEART FAILURE) (HCC): Status: ACTIVE | Noted: 2017-05-09

## 2017-05-09 LAB
ANION GAP BLD CALC-SCNC: 12 MMOL/L (ref 3–18)
BASOPHILS # BLD AUTO: 0.1 K/UL (ref 0–0.1)
BASOPHILS # BLD: 1 % (ref 0–2)
BUN SERPL-MCNC: 30 MG/DL (ref 7–18)
BUN/CREAT SERPL: 3 (ref 12–20)
CALCIUM SERPL-MCNC: 8.4 MG/DL (ref 8.5–10.1)
CHLORIDE SERPL-SCNC: 99 MMOL/L (ref 100–108)
CO2 SERPL-SCNC: 27 MMOL/L (ref 21–32)
CREAT SERPL-MCNC: 9.33 MG/DL (ref 0.6–1.3)
DIFFERENTIAL METHOD BLD: ABNORMAL
EOSINOPHIL # BLD: 0.7 K/UL (ref 0–0.4)
EOSINOPHIL NFR BLD: 4 % (ref 0–5)
ERYTHROCYTE [DISTWIDTH] IN BLOOD BY AUTOMATED COUNT: 13.1 % (ref 11.6–14.5)
GENTAMICIN SERPL-MCNC: 1.2 UG/ML (ref 0.5–10)
GLUCOSE BLD STRIP.AUTO-MCNC: 130 MG/DL (ref 70–110)
GLUCOSE BLD STRIP.AUTO-MCNC: 183 MG/DL (ref 70–110)
GLUCOSE SERPL-MCNC: 49 MG/DL (ref 74–99)
HCT VFR BLD AUTO: 25.4 % (ref 35–45)
HGB BLD-MCNC: 8.1 G/DL (ref 12–16)
LYMPHOCYTES # BLD AUTO: 18 % (ref 21–52)
LYMPHOCYTES # BLD: 2.7 K/UL (ref 0.9–3.6)
MCH RBC QN AUTO: 30.5 PG (ref 24–34)
MCHC RBC AUTO-ENTMCNC: 31.9 G/DL (ref 31–37)
MCV RBC AUTO: 95.5 FL (ref 74–97)
MONOCYTES # BLD: 0.6 K/UL (ref 0.05–1.2)
MONOCYTES NFR BLD AUTO: 4 % (ref 3–10)
NEUTS SEG # BLD: 11 K/UL (ref 1.8–8)
NEUTS SEG NFR BLD AUTO: 73 % (ref 40–73)
PHOSPHATE SERPL-MCNC: 3.9 MG/DL (ref 2.5–4.9)
PLATELET # BLD AUTO: 225 K/UL (ref 135–420)
PMV BLD AUTO: 10.4 FL (ref 9.2–11.8)
POTASSIUM SERPL-SCNC: 3.4 MMOL/L (ref 3.5–5.5)
RBC # BLD AUTO: 2.66 M/UL (ref 4.2–5.3)
SODIUM SERPL-SCNC: 138 MMOL/L (ref 136–145)
WBC # BLD AUTO: 15.1 K/UL (ref 4.6–13.2)

## 2017-05-09 PROCEDURE — 82962 GLUCOSE BLOOD TEST: CPT

## 2017-05-09 PROCEDURE — 74011250636 HC RX REV CODE- 250/636: Performed by: FAMILY MEDICINE

## 2017-05-09 PROCEDURE — 74011250636 HC RX REV CODE- 250/636: Performed by: INTERNAL MEDICINE

## 2017-05-09 PROCEDURE — 74011250637 HC RX REV CODE- 250/637: Performed by: INTERNAL MEDICINE

## 2017-05-09 PROCEDURE — 97530 THERAPEUTIC ACTIVITIES: CPT

## 2017-05-09 PROCEDURE — 74011636637 HC RX REV CODE- 636/637: Performed by: FAMILY MEDICINE

## 2017-05-09 PROCEDURE — 97535 SELF CARE MNGMENT TRAINING: CPT

## 2017-05-09 PROCEDURE — 90935 HEMODIALYSIS ONE EVALUATION: CPT

## 2017-05-09 PROCEDURE — 80048 BASIC METABOLIC PNL TOTAL CA: CPT | Performed by: INTERNAL MEDICINE

## 2017-05-09 PROCEDURE — 84100 ASSAY OF PHOSPHORUS: CPT | Performed by: INTERNAL MEDICINE

## 2017-05-09 PROCEDURE — 36415 COLL VENOUS BLD VENIPUNCTURE: CPT | Performed by: INTERNAL MEDICINE

## 2017-05-09 PROCEDURE — 74011636637 HC RX REV CODE- 636/637: Performed by: INTERNAL MEDICINE

## 2017-05-09 PROCEDURE — 85025 COMPLETE CBC W/AUTO DIFF WBC: CPT | Performed by: INTERNAL MEDICINE

## 2017-05-09 PROCEDURE — 80170 ASSAY OF GENTAMICIN: CPT | Performed by: INTERNAL MEDICINE

## 2017-05-09 RX ORDER — UREA 10 %
2 LOTION (ML) TOPICAL 2 TIMES DAILY
Qty: 60 TAB | Refills: 0 | Status: SHIPPED | OUTPATIENT
Start: 2017-05-09 | End: 2018-03-24

## 2017-05-09 RX ORDER — DIPHENHYDRAMINE HCL 25 MG
25 CAPSULE ORAL
Qty: 60 CAP | Refills: 0 | Status: SHIPPED | OUTPATIENT
Start: 2017-05-09 | End: 2017-05-19

## 2017-05-09 RX ORDER — ACETAMINOPHEN 325 MG/1
650 TABLET ORAL
Qty: 100 TAB | Refills: 0 | Status: SHIPPED | OUTPATIENT
Start: 2017-05-09 | End: 2021-03-15

## 2017-05-09 RX ORDER — INSULIN GLARGINE 100 [IU]/ML
INJECTION, SOLUTION SUBCUTANEOUS
Qty: 1 VIAL | Refills: 0 | Status: SHIPPED | OUTPATIENT
Start: 2017-05-09 | End: 2018-03-24

## 2017-05-09 RX ORDER — BISACODYL 5 MG
10 TABLET, DELAYED RELEASE (ENTERIC COATED) ORAL
Qty: 100 TAB | Refills: 0 | Status: SHIPPED | OUTPATIENT
Start: 2017-05-09 | End: 2018-03-24

## 2017-05-09 RX ORDER — PRAVASTATIN SODIUM 20 MG/1
20 TABLET ORAL
Qty: 100 TAB | Refills: 0 | Status: SHIPPED | OUTPATIENT
Start: 2017-05-09 | End: 2018-10-06

## 2017-05-09 RX ORDER — CINACALCET 30 MG/1
30 TABLET, FILM COATED ORAL
Qty: 100 TAB | Refills: 0 | Status: SHIPPED | OUTPATIENT
Start: 2017-05-09 | End: 2017-08-07

## 2017-05-09 RX ORDER — GENTAMICIN SULFATE 60 MG/50ML
120 INJECTION, SOLUTION INTRAVENOUS ONCE
Status: DISCONTINUED | OUTPATIENT
Start: 2017-05-09 | End: 2017-05-09 | Stop reason: HOSPADM

## 2017-05-09 RX ORDER — ASPIRIN 81 MG/1
81 TABLET ORAL DAILY
Qty: 100 TAB | Refills: 0 | Status: SHIPPED | OUTPATIENT
Start: 2017-05-09 | End: 2018-12-27

## 2017-05-09 RX ORDER — INSULIN GLARGINE 100 [IU]/ML
20 INJECTION, SOLUTION SUBCUTANEOUS DAILY
Status: DISCONTINUED | OUTPATIENT
Start: 2017-05-09 | End: 2017-05-09 | Stop reason: HOSPADM

## 2017-05-09 RX ORDER — SEVELAMER CARBONATE 800 MG/1
1600 TABLET, FILM COATED ORAL
Qty: 100 TAB | Refills: 0 | Status: SHIPPED | OUTPATIENT
Start: 2017-05-09 | End: 2018-03-24

## 2017-05-09 RX ORDER — INSULIN LISPRO 100 [IU]/ML
INJECTION, SOLUTION INTRAVENOUS; SUBCUTANEOUS
Qty: 1 VIAL | Refills: 0 | Status: SHIPPED | OUTPATIENT
Start: 2017-05-09 | End: 2018-03-24

## 2017-05-09 RX ADMIN — PREDNISONE 5 MG: 5 TABLET ORAL at 17:58

## 2017-05-09 RX ADMIN — CINACALCET HYDROCHLORIDE 30 MG: 30 TABLET, COATED ORAL at 17:58

## 2017-05-09 RX ADMIN — INSULIN GLARGINE 20 UNITS: 100 INJECTION, SOLUTION SUBCUTANEOUS at 17:57

## 2017-05-09 RX ADMIN — INSULIN LISPRO 2 UNITS: 100 INJECTION, SOLUTION INTRAVENOUS; SUBCUTANEOUS at 17:57

## 2017-05-09 RX ADMIN — ERYTHROPOIETIN 10000 UNITS: 10000 INJECTION, SOLUTION INTRAVENOUS; SUBCUTANEOUS at 11:47

## 2017-05-09 RX ADMIN — SEVELAMER CARBONATE 1600 MG: 800 TABLET, FILM COATED ORAL at 17:58

## 2017-05-09 RX ADMIN — HEPARIN SODIUM 5000 UNITS: 5000 INJECTION, SOLUTION INTRAVENOUS; SUBCUTANEOUS at 06:29

## 2017-05-09 NOTE — ADT AUTH CERT NOTES
Utilization Review           Sepsis and Other Febrile Illness, without Focal Infection - Care Day 5 (5/6/2017) by Gill Fernandez RN        Review Status Review Entered       Completed 5/8/2017       Details              Care Day: 5 Care Date: 5/6/2017 Level of Care:        Guideline Day 3        Clinical Status       (X) * Mental status normal or at baseline              Routes       (X) * Oral hydration              5/8/2017 12:38 PM EDT by Mehul Krueger       Subject: Additional Clinical Information       vs: 98.5, 88, 161/74, 18, 95%orders: diabetic diet, pulse ox, dialysis , pt/ot, skin care, vs continuous, neuros, strict I&Omeds: [id number] q 12hrs, gentamicin 80mg iv, labs: wbc 16.7, hgb 9.1, hct 27.2, neutrophils 77, k 3.3, glucose 120, bun 25, crt 6.79, Feeling better.   Saw her in HD room. No chest or abdominal pain.   No cough.   Last BM was yesterday. No N/V/D.   Lives with mother.   Wheelchair bound for 4 years  1. Acute metabolic encephalopathy due to sepsis, improved  2. E.coli UTI with sepsis  3. Fluid overload due to missed HD, resolved   4. Fever with immunocompromised status, improved  5. Hyponatremia due to fluid overload, resolved  6.   DM 2 with A1c 12. 3. Off insulin drip. 7. Loose BMs, resolved currently  8. ESRD  9. Anemia of ESRD  10. Chronic right basal ganglia and left thalamic old lacunar infarct. 11.Bilateral right greater than left mastoid air cells effusion/suggesting mastoiditis and middle ears effusion. 12. HTN  13. Lupus  14. Hypoxia due to fluid overload and Bibasilar atelectasis, resolved. 15. Leukocytosis  16. Pressure sore on coccygeal area  17. Hypertriglyceridemia   18. Right hepatic lobe lesion on CT - nothing acute on US  19. Low-attenuation in the upper pole the left kidney most likely a cyst.  20. Lytic lesion of the right iliac bone measuring 3.6 x 1.4 cm. 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  Decub wounds   @ left   inner buttocks and right   inner buttocks, POA.         Cont current antibiotic per ID  repeat blood c/s in am  D/c midodrine and monitor  Cont HD per nephrologyNephro:  Plan  Dialysis for volume and solute management  Epogen   78846 units. Hectorol: 1 g,  Continue antibiotics for   sepsis                                       * Milestone                  Sepsis and Other Febrile Illness, without Focal Infection - Care Day 3 (5/4/2017) by Real Rodgers, RN        Review Status Review Entered       Completed 5/4/2017       Details              Care Day: 3 Care Date: 5/4/2017 Level of Care:        Guideline Day 2        Clinical Status       (X) * Hemodynamic stability       (X) * Fever absent or reduced              Routes       (X) Diet as tolerated       5/4/2017 1:01 PM EDT by Katherine Hurley         diabetic diet                     Interventions       (X) WBC       5/4/2017 1:01 PM EDT by Katherine Hurley         wbc 18.0                     Medications       (X) Antibiotics       5/4/2017 1:01 PM EDT by Katherine Hurley         zosyn iv                     5/4/2017 1:01 PM EDT by Katherine Hurley       Subject: Additional Clinical Information       vs: 99.5, 58, 182/100, 36, 91%orders: enteric isolation, diabetic diet, abd us, MRI pelvis, pulse ox, Dialysis, pt/ot, vs continuous, strict I&O, neuros, meds: heparin 5000U q 12hrs, zosyn 0.75g iv, zosyn 2.25g iv q 12hrs, prednisone 5mg, labs: wbc 18.0, hgb 8.9, hct 27.1, neutrophils 79,   na 135, chloride 99, glucose 187, bun 42, crt 8.79  Saw her in HD room.   No chest or abdominal pain.   No cough.   No N/V/D.         Lives with mother.   Wheelchair bound for 4 years.       1. Acute metabolic encephalopathy, improved  2. GNR sepsis  3. Fluid overload due to missed HD  4. Fever with immunocompromised status, improved  5. Hyponatremia due to fluid overload, resolved  6. Uncontrolled DM 2, A1c 12. 3. Off insulin drip. 7. Loose BMs 2 times a day  8. ESRD  9. Anemia of ESRD  10.  Chronic right basal ganglia and left thalamic old lacunar infarct. 11.Bilateral right greater than left mastoid air cells effusion/suggesting mastoiditis and middle ears effusion. 12. HTN  13. Lupus  14. Hypoxia due to fluid overload and Bibasilar atelectasis, resolved. 15. Leukocytosis  16. Pressure sore on coccygeal area  17. Hypertriglyceridemia   18. Right hepatic lobe lesion on CT  19. Low-attenuation in the upper pole the left kidney most likely a cyst.  20. Lytic lesion of the right iliac bone measuring 3.6 x 1.4 cm.  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 Martinez [place] [facility] per nephrology                                       * Milestone

## 2017-05-09 NOTE — DISCHARGE INSTRUCTIONS
Learning About Saving Energy When You Have a Chronic Condition  Introduction  Everyday tasks can be tiring when you have COPD, heart failure, or another long-term (chronic) condition. You may feel at times that you've lost your ability to live your life. But learning to conserve, or save, your energy can help you be less tired. Conserving your energy means finding ways of doing daily activities with as little effort as possible. With some small changes in the way you do things, you can get your tasks done more easily. Some treatments are available that might help. Pulmonary rehabilitation can teach you ways to breathe easier. Cardiac rehabilitation can help make your heart stronger. You also may want to see an occupational or physical therapist. The therapist can give you more tips on building strength and moving with less effort. What can you do to conserve your energy? Planning  · Make a list of what you have to do every day. Group the tasks by location. · Do all the chores in one part of your house around the same time. · Go out for errands or do chores at the time of day when you have the most energy. · Plan rest periods into your day. Getting things done  · Sit down as often as you can when you get dressed, do chores, or cook. · Use a cart with wheels to roll items, such as laundry, from one room to another. · Push or slide boxes or other large items instead of lifting them. Reaching and bending  · Put things you use the most on shelves that are at the level of your waist or shoulder. · Use long-handled grabbers or other tools to reach items on a high shelf or to  things off the floor. Use long-handled dusters when you clean the house. · Use a raised toilet seat to avoid bending too far to sit or stand up. Eating  · Eat several small meals instead of three larger meals. · If you get too tired to eat much, try to choose healthy foods that have more calories.  Have a yogurt-and-fruit smoothie for breakfast. Put avocado on a sandwich. Or add cheese or peanut butter to snacks. · If you don't feel very hungry, try to eat first and drink water or other fluids later, after a meal. This can help keep you from losing weight. Sip small amounts of fluids if you need to drink while you eat. Having sex  · Choose the time of day when you have more energy. · A qxsk-zs-bmmf position for sex can be less tiring. Sometimes you may want to focus more on caressing. Watch closely for changes in your health, and be sure to contact your doctor if you have any problems. Where can you learn more? Go to http://tessaResonate Industriesmarcelle.info/. Enter H190 in the search box to learn more about \"Learning About Saving Energy When You Have a Chronic Condition. \"  Current as of: May 23, 2016  Content Version: 11.2  © 6352-6291 [employer]. Care instructions adapted under license by [employer] (which disclaims liability or warranty for this information). If you have questions about a medical condition or this instruction, always ask your healthcare professional. [place] [address] any warranty or liability for your use of this information. Learning About Saving Energy When You Have a Chronic Condition  Introduction  Everyday tasks can be tiring when you have COPD, heart failure, or another long-term (chronic) condition. You may feel at times that you've lost your ability to live your life. But learning to conserve, or save, your energy can help you be less tired. Conserving your energy means finding ways of doing daily activities with as little effort as possible. With some small changes in the way you do things, you can get your tasks done more easily. Some treatments are available that might help. Pulmonary rehabilitation can teach you ways to breathe easier. Cardiac rehabilitation can help make your heart stronger.  You also may want to see an occupational or physical therapist. The therapist can give you more tips on building strength and moving with less effort. What can you do to conserve your energy? Planning  · Make a list of what you have to do every day. Group the tasks by location. · Do all the chores in one part of your house around the same time. · Go out for errands or do chores at the time of day when you have the most energy. · Plan rest periods into your day. Getting things done  · Sit down as often as you can when you get dressed, do chores, or cook. · Use a cart with wheels to roll items, such as laundry, from one room to another. · Push or slide boxes or other large items instead of lifting them. Reaching and bending  · Put things you use the most on shelves that are at the level of your waist or shoulder. · Use long-handled grabbers or other tools to reach items on a high shelf or to  things off the floor. Use long-handled dusters when you clean the house. · Use a raised toilet seat to avoid bending too far to sit or stand up. Eating  · Eat several small meals instead of three larger meals. · If you get too tired to eat much, try to choose healthy foods that have more calories. Have a yogurt-and-fruit smoothie for breakfast. Put avocado on a sandwich. Or add cheese or peanut butter to snacks. · If you don't feel very hungry, try to eat first and drink water or other fluids later, after a meal. This can help keep you from losing weight. Sip small amounts of fluids if you need to drink while you eat. Having sex  · Choose the time of day when you have more energy. · A omvw-ct-ykkp position for sex can be less tiring. Sometimes you may want to focus more on caressing. Watch closely for changes in your health, and be sure to contact your doctor if you have any problems. Where can you learn more? Go to http://tessa-marcelle.info/.   Enter H190 in the search box to learn more about \"Learning About Saving Energy When You Have a Chronic Condition. \"  Current as of: May 23, 2016  Content Version: 11.2  © 4793-5890 [employer]. Care instructions adapted under license by [employer] (which disclaims liability or warranty for this information). If you have questions about a medical condition or this instruction, always ask your healthcare professional. [address] any warranty or liability for your use of this information. DISCHARGE SUMMARY from Nurse    The following personal items are in your possession at time of discharge:    Dental Appliances: None  Visual Aid: None     Home Medications: None  Jewelry: None  Clothing: At bedside, Pants, Socks, Undergarments  Other Valuables: None             PATIENT INSTRUCTIONS:    After general anesthesia or intravenous sedation, for 24 hours or while taking prescription Narcotics:  · Limit your activities  · Do not drive and operate hazardous machinery  · Do not make important personal or business decisions  · Do  not drink alcoholic beverages  · If you have not urinated within 8 hours after discharge, please contact your surgeon on call. Report the following to your surgeon:  · Excessive pain, swelling, redness or odor of or around the surgical area  · Temperature over 100.5  · Nausea and vomiting lasting longer than 4 hours or if unable to take medications  · Any signs of decreased circulation or nerve impairment to extremity: change in color, persistent  numbness, tingling, coldness or increase pain  · Any questions        What to do at Home:  Recommended activity: Activity as tolerated    If you experience any of the following symptoms chest pain, chest tightness,shortness of breathe, nausea, vomiting, or spontaneous bleeding, please follow up with primary care provider or emergency department. *  Please give a list of your current medications to your Primary Care Provider.     *  Please update this list whenever your medications are discontinued, doses are      changed, or new medications (including over-the-counter products) are added. *  Please carry medication information at all times in case of emergency situations. These are general instructions for a healthy lifestyle:    No smoking/ No tobacco products/ Avoid exposure to second hand smoke    Surgeon General's Warning:  Quitting smoking now greatly reduces serious risk to your health. Obesity, smoking, and sedentary lifestyle greatly increases your risk for illness    A healthy diet, regular physical exercise & weight monitoring are important for maintaining a healthy lifestyle    You may be retaining fluid if you have a history of heart failure or if you experience any of the following symptoms:  Weight gain of 3 pounds or more overnight or 5 pounds in a week, increased swelling in our hands or feet or shortness of breath while lying flat in bed. Please call your doctor as soon as you notice any of these symptoms; do not wait until your next office visit. Recognize signs and symptoms of STROKE:    F-face looks uneven    A-arms unable to move or move unevenly    S-speech slurred or non-existent    T-time-call 911 as soon as signs and symptoms begin-DO NOT go       Back to bed or wait to see if you get better-TIME IS BRAIN. Warning Signs of HEART ATTACK     Call 911 if you have these symptoms:   Chest discomfort. Most heart attacks involve discomfort in the center of the chest that lasts more than a few minutes, or that goes away and comes back. It can feel like uncomfortable pressure, squeezing, fullness, or pain.  Discomfort in other areas of the upper body. Symptoms can include pain or discomfort in one or both arms, the back, neck, jaw, or stomach.  Shortness of breath with or without chest discomfort.  Other signs may include breaking out in a cold sweat, nausea, or lightheadedness. Don't wait more than five minutes to call 911 - MINUTES MATTER!  Fast action can save your life. Calling 911 is almost always the fastest way to get lifesaving treatment. Emergency Medical Services staff can begin treatment when they arrive -- up to an hour sooner than if someone gets to the hospital by car. The discharge information has been reviewed with the patient. The patient verbalized understanding. Discharge medications reviewed with the patient and appropriate educational materials and side effects teaching were provided. Patient armband removed and shredded. [employer] Activation    Thank you for requesting access to [employer]. Please follow the instructions below to securely access and download your online medical record. [employer] allows you to send messages to your doctor, view your test results, renew your prescriptions, schedule appointments, and more. How Do I Sign Up? 1. In your internet browser, go to [email or web address]  2. Click on the First Time User? Click Here link in the Sign In box. You will be redirect to the New Member Sign Up page. 3. Enter your [employer] Access Code exactly as it appears below. You will not need to use this code after youve completed the sign-up process. If you do not sign up before the expiration date, you must request a new code. [employer] Access Code: Activation code not generated  Current [employer] Status: Patient Declined (This is the date your [employer] access code will )    4. Enter the last four digits of your Social Security Number (xxxx) and Date of Birth (mm/dd/yyyy) as indicated and click Submit. You will be taken to the next sign-up page. 5. Create a [employer] ID. This will be your [employer] login ID and cannot be changed, so think of one that is secure and easy to remember. 6. Create a [employer] password. You can change your password at any time. 7. Enter your Password Reset Question and Answer. This can be used at a later time if you forget your password. 8. Enter your e-mail address.  You will receive e-mail notification when new information is available in [address] 9. Click Sign Up. You can now view and download portions of your medical record. 10. Click the Download Summary menu link to download a portable copy of your medical information. Additional Information    If you have questions, please visit the Frequently Asked Questions section of the [employer] website at [email or web address] Remember, [employer] is NOT to be used for urgent needs. For medical emergencies, dial 911.

## 2017-05-09 NOTE — DISCHARGE SUMMARY
[facility]  TRANSFER SUMMARY    Name:  Robbie Alston  MR#:  [id number]  :  1970  Account #:  [de-identified]  Date of Adm:  2017  Date of Transfer:      FINAL DIAGNOSES  1. Acute congestive heart failure. 2. End-stage renal disease on dialysis x5 years. 3. Systemic lupus erythematosus. 4. Insulin-dependent diabetes mellitus, poorly controlled. 5. Chronic steroid therapy. 6/ UTI. E.Coli  7. Sepsis, due to UTI, E. Coli  8. DTI right and left inner buttocks, HAP ulcers      DIET: Diabetic. ACTIVITY: As tolerated. MEDICATION ON DISCHARGE  1. Lantus 20 units daily. 2. Aspirin 81 mg daily. 3. Nephrocaps 1 capsule daily. 4. Sensipar 30 mg daily. 5. Hectorol 4 mcg on dialysis days until discontinued. 6. Epogen 10,000 units on dialysis days, , ,  . 7. Gentamicin 120 mg daily at dialysis till   8. Floranex 2 tablets b.i.d.  9. Pravachol 20 mg daily. 10. Prednisone 5 mg daily. 11. Renvela 1600 mg 3 times daily. 12. Tylenol 650 every 4 hours p.r.n. 13. Dulcolax 10 mg daily. SUMMARY: This is a 49-year-old black female with history of end-  stage renal disease on dialysis x5 years, history of lupus on chronic  steroid therapy, history of insulin-dependent diabetes mellitus, was  admitted because of shortness of breath, likely due to missing dialysis  days. On admission, her blood sugars was around 600. There are no  list of medications prior to admission. It is unclear what she was taking  previously, except that she was on steroids. She reports history of  stroke in the past, which was confirmed by CT of the head which  showed chronic right basilar ganglia and left thalamic old infarcts. She  tells me stroke happened when she was age 22 and age 30, could be  related to lupus. PHYSICAL EXAMINATION: She was awake, alert, and coherent. Vital  signs are stable.  She has some contractures of both hands which she  thinks is related to old stroke. No clear focal neurologic deficits. COURSE AND MANAGEMENT: Chest x-ray showed no acute  process, but showed cardiomegaly. She had numerous WBCs in the  urine. Nephrology as well as Infectious Disease were consulted. CLINICAL COURSE AND MANAGEMENT: The patient was put on  gentamicin urine culture grew E coli. Subsequent cultures were  negative. There was 1 gram-positive bacteremia. The patient had  dialysis. Her blood sugars were fairly controlled. Potassium was low,  which was replaced. At this time, she is afebrile, clinically improved. White count is still up at 15,000. Gentamicin will probably need to be  continued for a few more days. She is due for dialysis today and when  okay with Renal, the patient can be discharged after dialysis. Stop date  gentamicin per Renal or ID. She is to followup with Dr. Fidencio Maldonado.     CODE STATUS: MD Dimitrios Teague / Phani Salinas  D:  05/09/2017   07:36  T:  05/09/2017   08:04  Job #:  [id number]  Addendum [employer]an[employer] called for preauthorization 5/11  Insulin, sensipar substituted to meds not requiring preauthorization

## 2017-05-09 NOTE — PROGRESS NOTES
Problem: Self Care Deficits Care Plan (Adult)  Goal: *Acute Goals and Plan of Care (Insert Text)  Occupational Therapy Goals  Initiated 5/5/2017 within 7 day(s). 1. Patient will perform grooming with supervision/set-up   2. Patient will perform upper body dressing with supervision/set-up. 3. Patient will perform lower body dressing with minimal assistance/contact guard assist.  4. Patient will perform toilet transfers with minimal assistance/contact guard assist.  5. Patient will perform all aspects of toileting with minimal assistance/contact guard assist.  6. Patient will participate in upper extremity therapeutic exercise/activities with supervision/set-up in preparation for self care tasks   Outcome: Progressing Towards Goal  OCCUPATIONAL THERAPY TREATMENT     Patient: Laurence Cooper (48 y.o. female)  Date: 5/9/2017  Diagnosis: Sepsis ([facility]  Hyperglycemia  Acute febrile illness  UTI (urinary tract infection)  ESRD on hemodialysis ([facility] <principal problem not specified>       Precautions: Fall  Chart, occupational therapy assessment, plan of care, and goals were reviewed. ASSESSMENT:  Pt is finishing up breakfast upon entry. Pt required some encouragement to participate in OT this session. Pt required additional time to maneuver to EOB and SBA. Pt participated in ADL grooming task and UB dressing seated EOB w/Supervised A 2/2 decreased dynamic sitting balance. Pt is refusing to participate in OOB activity and requesting to return to sup at the end of the session 2/2 stating she will be going to dialysis soon. Pt is educated on the importance of EOB/OOB activities to increase strength and improve overall activity tolerance for carryover w/ADLs. Pt verbalized understanding, pt's CNA is present in room at the end of the session to take pt's vitals.   Progression toward goals:  [ ]          Improving appropriately and progressing toward goals  [X]          Improving slowly and progressing toward goals  [ ] Not making progress toward goals and plan of care will be adjusted       PLAN:  Patient continues to benefit from skilled intervention to address the above impairments. Continue treatment per established plan of care. Discharge Recommendations:  Home Health w/24hrs supervision vs Talat Lopez  Further Equipment Recommendations for Discharge:  N/A      G-CODES:      Self Care  Current  CK= 40-59%. The severity rating is based on the Level of Assistance required for Functional Mobility and ADLs. SUBJECTIVE:   Patient stated I guess I can do that.       OBJECTIVE DATA SUMMARY:   Cognitive/Behavioral Status:  Neurologic State: Alert  Orientation Level: Oriented X4  Cognition: Follows commands  Safety/Judgement: Awareness of environment, Fall prevention     Functional Mobility and Transfers for ADLs:              Bed Mobility:  Rolling: Stand-by asssistance  Supine to Sit: Stand-by asssistance  Sit to Supine: Contact guard assistance  Scooting: Maximum assistance              Balance:  Sitting: Impaired; Without support  Sitting - Static: Good (unsupported)  Sitting - Dynamic: Fair (occasional) (plus)     ADL Intervention:     Grooming  Grooming Assistance: Supervision/set up (seated EOB)  Washing Face: Supervision/set-up  Washing Hands: Supervision/set-up  Brushing Teeth: Supervision/set-up        Upper Body [address]: Supervision/ set-up     Pain:  Pt reports 0/10 pain or discomfort prior to treatment. Pt reports 0/10 pain or discomfort post treatment. Activity Tolerance:    Fair     Please refer to the flowsheet for vital signs taken during this treatment.   After treatment:   [ ]  Patient left in no apparent distress sitting up in chair  [X]  Patient left in no apparent distress in bed  [X]  Call bell left within reach  [X]  Nursing notified  [ ]  Caregiver present  [ ]  Bed alarm activated     WANG DavidA/L  Time Calculation: 24 mins

## 2017-05-09 NOTE — PROGRESS NOTES
Spoke to pt to review discharge plans. Stated her mother, Chacha Sanchez will transport her home and gave permission for CM to call mother- spoke to Ms, Nataliia Miner who will transport pt home at 6:30 PM and will take all discharge RX to [facility] that closes at 9:30 PM and have them filled this date. . They live very close to the pharmacy. .    Elham Amin talked to Meme Estes & confirmed pt to resume dialysis on Thursday , 05-11-17 at 10:30 chair time  On T-T-S;  pt has care giver who will transport. .. Nurse Andrey informed.

## 2017-05-09 NOTE — DIALYSIS
ACUTE HEMODIALYSIS FLOW SHEET    HEMODIALYSIS ORDERS: Physician: Fanny Groom:  Finn        Duration: 3.5 hr  BFR: 350   DFR: 600   Dialysate:  Temp 36-37 K+   3    Ca+  2.5 Na 140 Bicarb 35   Weight:  66 kg    Bed Scale []     Unable to Obtain []      Dry weight/UF Goal: 2000ml Access AVF  Needle Gauge 15    Heparin []  Bolus      Units    [] Hourly       Units    [x]None     Catheter locking solution NA   Pre BP:   113/70    Pulse:     63     Temperature:   97.4  Respirations: 18  Tx: NS       ml/Bolus  Other        [x] N/A   Labs: Pre        Post:        [x] N/A   Additional Orders(medications, blood products, hypotension management):       [x] N/A     [x] Time Out/Safety Check  [x] [facility] Consent Verified     CATHETER ACCESS: [x]N/A   []Right   []Left   []IJ     []Fem   [] First use X-ray verified     []Tunnel                [] Non Tunneled   []No S/S infection  []Redness  []Drainage []Cultured []Swelling []Pain   []Medical Aseptic Prep Utilized   []Dressing Changed  [] Biopatch  Date:       []Clotted   []Patent   Flows: []Good  []Poor  []Reversed   If access problem,  notified: []Yes    []N/A  Date:           GRAFT/FISTULA ACCESS:  []N/A     []Right     [x]Left     [x]UE     []LE   []AVG   [x]AVF        []Buttonhole    [x]Medical Aseptic Prep Utilized   [x]No S/S infection  []Redness  []Drainage []Cultured []Swelling []Pain    Bruit:   [x] Strong    [] Weak       Thrill :   [x] Strong    [] Weak       Needle Gauge: 15   Length: 1in   If access problem,  notified: []Yes     [x]N/A  Date:        Please describe access if present and not used:       GENERAL ASSESSMENT:    LUNGS:  Rate 18 SaO2%        [x] N/A    [x] Clear  [] Coarse  [] Crackles  [] Wheezing        [] Diminished     Location : []RLL   []LLL    []RUL  []TELLY   Cough: []Productive  []Dry  [x]N/A   Respirations:  []Easy  []Labored   Therapy:  [x]RA  []NC  l/min    Mask: []NRB []Venti       O2%                  []Ventilator []Intubated  [] Trach  [] BiPaP   CARDIAC: [x]Regular      [] Irregular   [] Pericardial Rub  [] JVD        []  Monitored  [] Bedside  [] Remotely monitored [] N/A  Rhythm:    EDEMA: [] None  [x]Generalized  [] Pitting [] 1    [] 2    [] 3    [] 4                 [] Facial  [] Pedal  []  UE  [] LE   SKIN:   [x] Warm  [] Hot     [] Cold   [] Dry     [] Pale   [] Diaphoretic                  [] Flushed  [] Jaundiced  [] Cyanotic  [] Rash  [] Weeping   LOC:    [x] Alert      [x]Oriented:    [x] Person     [x] Place  [x]Time               [] Confused  [] Lethargic  [] Medicated  [] Non-responsive     GI / ABDOMEN:  [] Flat    [] Distended    [x] Soft    [] Firm   []  Obese                             [] Diarrhea  [x] Bowel Sounds  [] Nausea  [] Vomiting       / URINE ASSESSMENT:[] Voiding   [x] Oliguria  [] Anuria   []  Mirza     [] Incontinent    []  Incontinent Brief      []  Bathroom Privileges     PAIN: [x] 0 []1  []2   []3   []4   []5   []6   []7   []8   []9   []10            Scale 0-10  Action/Follow Up:    MOBILITY:  [] Amb    [] Amb/Assist    [x] Bed    [] Wheelchair  [] Stretcher      All Vitals and Treatment Details on Attached [address]: [facility]          Room # 211     [] 1st Time Acute  [] Stat  [] Routine  [] Urgent     [] Acute Room  []  Bedside  [] ICU/CCU  [] ER   Isolation Precautions:  [] Dialysis   [] Airborne   [] Contact    [] Reverse   Special Considerations:         [] Blood Consent Verified []N/A     ALLERGIES:   [] NKA  Allergies   Sulfa (sulfonamide Antibiotics)                Code Status:  [x] Full Code  [] DNR  [] Other           HBsAg ONLY: Date Drawn 05/02/2017         [x]Negative []Positive []Unknown   HBsAb: Date     [] Susceptible   [x] [id number] []Not Drawn  [] Drawn     Current Labs:    Date of Labs: Today [x]        Cut and paste current labs here. Results for Arabella Solo (MRN [id number]) as of 5/9/2017 10:30   Ref.  Range 5/9/2017 03:18   WBC Latest Ref Range: 4.6 - 13.2 K/uL 15.1 (H)   RBC Latest Ref Range: 4.20 - 5.30 M/uL 2.66 (L)   HGB Latest Ref Range: 12.0 - 16.0 g/dL 8.1 (L)   HCT Latest Ref Range: 35.0 - 45.0 % 25.4 (L)   MCV Latest Ref Range: 74.0 - 97.0 FL 95.5   MCH Latest Ref Range: 24.0 - 34.0 PG 30.5   MCHC Latest Ref Range: 31.0 - 37.0 g/dL 31.9   RDW Latest Ref Range: 11.6 - 14.5 % 13.1   PLATELET Latest Ref Range: 135 - 420 K/uL 225   MPV Latest Ref Range: 9.2 - 11.8 FL 10.4   NEUTROPHILS Latest Ref Range: 40 - 73 % 73   LYMPHOCYTES Latest Ref Range: 21 - 52 % 18 (L)   MONOCYTES Latest Ref Range: 3 - 10 % 4   EOSINOPHILS Latest Ref Range: 0 - 5 % 4   BASOPHILS Latest Ref Range: 0 - 2 % 1   DF Latest Units:   AUTOMATED   ABS. NEUTROPHILS Latest Ref Range: 1.8 - 8.0 K/UL 11.0 (H)   ABS. LYMPHOCYTES Latest Ref Range: 0.9 - 3.6 K/UL 2.7   ABS. MONOCYTES Latest Ref Range: 0.05 - 1.2 K/UL 0.6   ABS. EOSINOPHILS Latest Ref Range: 0.0 - 0.4 K/UL 0.7 (H)   ABS. BASOPHILS Latest Ref Range: 0.0 - 0.1 K/UL 0.1   Sodium Latest Ref Range: 136 - 145 mmol/L 138   Potassium Latest Ref Range: 3.5 - 5.5 mmol/L 3.4 (L)   Chloride Latest Ref Range: 100 - 108 mmol/L 99 (L)   CO2 Latest Ref Range: 21 - 32 mmol/L 27   Anion gap Latest Ref Range: 3.0 - 18 mmol/L 12   Glucose Latest Ref Range: 74 - 99 mg/dL 49 (L)   BUN Latest Ref Range: 7.0 - 18 MG/DL 30 (H)   Creatinine Latest Ref Range: 0.6 - 1.3 MG/DL 9.33 (H)   BUN/Creatinine ratio Latest Ref Range: 12 - 20   3 (L)   Calcium Latest Ref Range: 8.5 - 10.1 MG/DL 8.4 (L)   Phosphorus Latest Ref Range: 2.5 - 4.9 MG/DL 3.9   GFR est non-AA Latest Ref Range: >60 ml/min/1.73m2 4 (L)   GFR est AA Latest Ref Range: >60 ml/min/1.73m2 5 (L)                                                                                                                                 DIET:  [x] Renal    [] Other     [] NPO     []  Diabetic      PRIMARY NURSE REPORT: First initial/Last name/Title      Pre Dialysis: SEAN Avila RN    Time: 7323 EDUCATION:    [x] Patient [] Other         Knowledge Basis: []None []Minimal [x] Substantial   Barriers to learning  [x]N/A   [x] Access Care     [] S&S of infection     [] Fluid Management     []K+     [x]Procedural    []Albumin     [] Medications     [] Tx Options     [] Transplant     [] Diet     [] Other   Teaching Tools:  [x] Explain  [] Demo  [] Handouts [] Video  Patient response:   [x] Verbalized understanding  [] Teach back  [] Return demonstration [] Requires follow up   Inappropriate due to            [address] Before each treatment:     Machine Number:                   [facility]                                  [x] Unit Machine # 5 with centralized RO                                  [] Portable Machine #1/RO serial # [id number]                                  [] Portable Machine #2/RO serial # [id number]                                  [] Portable Machine #3/RO serial # [id number]                                                                                                       [address] [facility]                                  [] Portable Machine #11/RO serial # [id number]                                   [] Portable Machine #12/RO serial # [id number]                                  [] Portable Machine #13/RO serial #  [id number]      Alarm Test:  Pass time 9944         Other:         [x] RO/Machine Log Complete      Temp    37.0            [x]Extracorporeal Circuit Tested for integrity   Dialysate: pH  7.4 Conductivity: Meter   14.0     HD Machine   14.2                  TCD: 13.9  Dialyzer Lot # [id number]            Blood Tubing Lot # [id number]          Saline Lot #  [id number]     CHLORINE TESTING-Before each treatment and every 4 hours    Total Chlorine: [x] less than 0.1 ppm  Time: 0800 4 Hr/2nd Check Time: 1100   (if greater than 0.1 ppm from Primary then every 30 minutes from Secondary)     TREATMENT INITIATION  with Dialysis Precautions:   [x] All Connections Secured                 [x] Saline Line Double Clamped   [x] Venous Parameters Set                  [x] Arterial Parameters Set    [x] Prime Given  250 ml                       [x]Air Foam Detector Engaged      Treatment Initiation Note: LUE AVF +bruit/thrill, cannulated x2 without complication, tx initiated and pt tolerating well. Medication Dose Volume Route Initials Dialyzer Cleared: [] Good [x] Fair  [] Poor    Blood processed:  68.8 L  UF Removed  612 Ml    Post Wt: 65.1    kg  POst BP:   109/62       Pulse: 96      Respirations: 16  Temperature: 97.4                                   Post Tx Vascular Access: AVF/AVG: Bleeding stopped Art 6 min. Juan. 7 Min                                      Catheter: Locking solution: Heparin 1:1000 Art. Juan. N/A                                 Post Assessment:                                    Skin:  [x] Warm  [] Dry [] Diaphoretic    [] Flushed  [] Pale [] Cyanotic   [facility] Signatures Title Initials  Time Lungs: [x] Clear    [] Course  [] Crackles  [] Wheezing [] Diminished   Traci Begun RN AG  Cardiac: [x] Regular   [] Irregular   [] Monitor  [] N/A  Rhythm:           Edema:  [] None    [x] General     [] Facial   [] Pedal    [] UE    [] LE       Pain: [x]0  []1  []2   []3  []4   []5   []6   []7   []8   []9   []10         Post Treatment Note: HD tx complete, patient tolerated procedure well, 935ml net UF removed     POST TREATMENT PRIMARY NURSE HANDOFF REPORT:     First initial/Last name/Title         Post Dialysis: SEAN Lemon RN Time:  4604     Abbreviations: AVG-arterial venous graft, AVF-arterial venous fistula, IJ-Internal Jugular, Subcl-Subclavian, Fem-Femoral, Tx-treatment, AP/HR-apical heart rate, DFR-dialysate flow rate, BFR-blood flow rate, AP-arterial pressure, -venous pressure, UF-ultrafiltrate, TMP-transmembrane pressure, Juan-Venous, Art-Arterial, RO-Reverse Osmosis

## 2017-05-09 NOTE — PROGRESS NOTES
Infectious Disease Progress Note    Requested by: Dr. Juan crystal    Reason: sepsis, gram negative bacteremia    Current abx Prior abx   Gentamicin since 5/5 Vancomycin 5/3  Piperacillin/tazobactam  5/3-5/5     Lines:       Assessment :    48 y.o.  female with history of type 2 DM (uncontrolled), lupus, ESRD on HD who presented to the ED on 5/2/17 for evaluation of SOB. Clinical picture consistent with sepsis (POA) due to e.coli bloodstream infection (positive blood cultures 5/2, 5/4; negative blood cultures 5/7). Bloodstream infection likely due to e.coli cystitis. However, blood cultures 5/4 still positive. Delayed clearance could be due to concurrent immunocompromised state. Will repeat blood cultures to determine time of clearance of bacteremia. Ct abdomen/pelvis didn't reveal any obvious inflammation/infection. No vegetations on transthoracic echo. No erythema over LUE fistula site. Diarrhea on admission could be due to cellcept. No further diarrhea. Clinically better    Recommendations:    1. continue gentamicin till 5/21 - can be given with hd - discussed with dr. Padmini Padron  2. D/c planning per primary team    Above plan was discussed in details with patient. Please call me if any further questions or concerns. Will continue to participate in the care of this patient. subjective:    Feels better. Patient denies headaches, visual disturbances, sore throat, runny nose, earaches, cp, sob, chills, cough, abdominal pain, diarrhea, burning micturition, pain or weakness in extremities. she denies flank pain. Patient's home Medications    PREDNISONE (DELTASONE) 5 MG TABLET    Take 5 mg by mouth.        Current Facility-Administered Medications   Medication Dose Route Frequency    insulin glargine (LANTUS) injection 20 Units  20 Units SubCUTAneous DAILY    GENTAMICIN INFORMATION NOTE   Other PRN    epoetin dwaine (EPOGEN;PROCRIT) injection 10,000 Units  10,000 Units IntraVENous DIALYSIS TUE, THU & SAT    predniSONE (DELTASONE) tablet 5 mg  5 mg Oral DAILY WITH BREAKFAST    insulin lispro (HUMALOG) injection   SubCUTAneous AC&HS    Lactobacillus Acidoph & Bulgar [facility]) tablet 2 Tab  2 Tab Oral BID    aspirin delayed-release tablet 81 mg  81 mg Oral DAILY    pravastatin (PRAVACHOL) tablet 20 mg  20 mg Oral QHS    sodium chloride (NS) flush 5-10 mL  5-10 mL IntraVENous PRN    doxercalciferol (HECTOROL) 4 mcg/2 mL injection 1 mcg  1 mcg IntraVENous DIALYSIS ONCE    glucose chewable tablet 16 g  4 Tab Oral PRN    glucagon (GLUCAGEN) injection 1 mg  1 mg IntraMUSCular PRN    dextrose (D50W) injection syrg 12.5-25 g  25-50 mL IntraVENous PRN    acetaminophen (TYLENOL) tablet 650 mg  650 mg Oral Q4H PRN    diphenhydrAMINE (BENADRYL) capsule 25 mg  25 mg Oral Q4H PRN    ondansetron (ZOFRAN) injection 4 mg  4 mg IntraVENous Q4H PRN    bisacodyl (DULCOLAX) tablet 10 mg  10 mg Oral DAILY PRN    heparin (porcine) injection 5,000 Units  5,000 Units SubCUTAneous Q12H    cinacalcet (SENSIPAR) tablet 30 mg  30 mg Oral DAILY WITH BREAKFAST    sevelamer carbonate (RENVELA) tab 1,600 mg  1,600 mg Oral TID WITH MEALS    B complex-vitaminC-folic acid (NEPHROCAP) cap  1 Cap Oral DAILY       Allergies: Sulfa (sulfonamide antibiotics)    Temp (24hrs), Av.2 °F (37.3 °C), Min:98.1 °F (36.7 °C), Max:100.9 °F (38.3 °C)    Visit Vitals    /73    Pulse 97    Temp 98.1 °F (36.7 °C)    Resp 20    Ht 5' 9\" (1.753 m)    Wt 66 kg (145 lb 8 oz)    SpO2 95%    Breastfeeding No    BMI 21.49 kg/m2       ROS: 12 point ROS obtained in details. Pertinent positives as mentioned in HPI,   otherwise negative    Physical Exam:    General: Well developed, well nourished female laying on the bed AAOx3 in no acute distress.     General:   awake alert and oriented   HEENT:  Normocephalic, atraumatic, PERRL, EOMI, no scleral icterus or pallor; no conjunctival hemmohage;  nasal and oral mucous are moist and without evidence of lesions. No thrush. Neck supple, no bruits. Lymph Nodes:   no cervical, axillary or inguinal adenopathy   Lungs:   non-labored, bilaterally clear to auscultation- no crackles wheezes rales or rhonchi   Heart:  RRR, s1 and s2;  rubs or gallops, no edema, + pedal pulses   Abdomen:  soft, non-distended, active bowel sounds, no hepatomegaly, no splenomegaly. Non-tender   Genitourinary:  deferred   Extremities:   no clubbing, cyanosis; no joint effusions or swelling;  Full ROM of all large joints to the upper and lower extremities; muscle mass appropriate for age   Neurologic:  No gross focal motor or sensory deficits                        Skin:  No rash or ulcers noted   Back:  no spinal or paraspinal muscle tenderness or rigidity, no CVA tenderness     Psychiatric:  No suicidal or homicidal ideations, appropriate mood and affect         Labs: Results:   Chemistry Recent Labs      05/09/17 0318 05/08/17   0333  05/07/17   0135   GLU  49*  124*  35*   NA  138  136  138   K  3.4*  3.2*  3.1*   CL  99*  99*  100   CO2  27  27  28   BUN  30*  18  11   CREA  9.33*  6.50*  4.05*   CA  8.4*  8.6  9.0   AGAP  12  10  10   BUCR  3*  3*  3*      CBC w/Diff Recent Labs      05/09/17 0318 05/08/17 0333  05/07/17   0135   WBC  15.1*  15.2*  16.7*   RBC  2.66*  2.68*  2.91*   HGB  8.1*  8.2*  8.9*   HCT  25.4*  25.7*  27.5*   PLT  225  222  203   GRANS  73  69  82*   LYMPH  18*  18*  12*   EOS  4  5  3      Microbiology Recent Labs      05/07/17 0348  05/07/17   0338   CULT  NO GROWTH 2 DAYS  NO GROWTH 2 DAYS          RADIOLOGY:    All available imaging studies/reports in [facility] care for this admission were reviewed    Dr. Chema Gottlieb, Infectious Disease Specialist  [phone]  May 9, 2017  2:47 PM

## 2017-05-09 NOTE — PROGRESS NOTES
Lamine Subramanian M.D. PROGRESS NOTE    Name: Catherine Cantu MRN: [id number]   : 1970 Hospital: [facility]   Date: 2017  Admission Date: 2017     Subjective/Objective/Plans  For HD today  BS up  UTI on Gentamycin  SLE on steroids  Old CVA    Feels fine, completed HD  Plan  Will increase Lantus 20 units from 15  Tentative DS dictated    Vital Signs:  Visit Vitals    /73    Pulse 97    Temp 98.1 °F (36.7 °C)    Resp 20    Ht 5' 9\" (1.753 m)    Wt 66 kg (145 lb 8 oz)    SpO2 95%    Breastfeeding No    BMI 21.49 kg/m2       O2 Device: Room air       Temp (24hrs), Av °F (37.2 °C), Min:98.1 °F (36.7 °C), Max:100.9 °F (38.3 °C)     7:28 AM  Intake/Output:   Last shift:         Last 3 shifts:    No intake or output data in the 24 hours ending 17      Physical Exam:    General: in no apparent distress    HEENT: pupils equal, no ear discharge   Neck: No abnormally enlarged lymph nodes. , no JDV   Mouth: MMM no lesions    Chest: normal, no breast masses   Lungs: normal air entry   Heart: Regular rate and rhythm   Abdomen: abdomen is soft without significant tenderness, masses, organomegaly or guarding   Extremity: negative   Neuro: alert    Skin: Skin color, texture, turgor normal. No rashes or lesions    Data Review:    Labs: Results:       Chemistry Recent Labs      17   GLU  49*  124*  35*   NA  138  136  138   K  3.4*  3.2*  3.1*   CL  99*  99*  100   CO2  27  27  28   BUN  30*  18  11   CREA  9.33*  6.50*  4.05*   CA  8.4*  8.6  9.0   AGAP  12  10  10   BUCR  3*  3*  3*      CBC w/Diff Recent Labs      17   WBC  15.1*  15.2*  16.7*   RBC  2.66*  2.68*  2.91*   HGB  8.1*  8.2*  8.9*   HCT  25.4*  25.7*  27.5*   PLT  225  222  203   GRANS  73  69  82*   LYMPH  18*  18*  12*   EOS  4  5  3      Cardiac Enzymes No results for input(s): CPK, CKND1, ELADIO in the last 72 hours.     No lab exists for component: CKRMB, TROIP   Coagulation No results for input(s): PTP, INR, APTT in the last 72 hours. No lab exists for component: INREXT    Lipid Panel Lab Results   Component Value Date/Time    Cholesterol, total 162 05/03/2017 05:20 AM    HDL Cholesterol 32 05/03/2017 05:20 AM    LDL, calculated 52.6 05/03/2017 05:20 AM    VLDL, calculated 77.4 05/03/2017 05:20 AM    Triglyceride 387 05/03/2017 05:20 AM    CHOL/HDL Ratio 5.1 05/03/2017 05:20 AM      BNP No results for input(s): BNPP in the last 72 hours. Liver Enzymes No results for input(s): TP, ALB, TBILI, AP, SGOT, ALT, CBIL in the last 72 hours. Thyroid Studies Lab Results   Component Value Date/Time    TSH 0.68 05/02/2017 11:30 AM          Procedures/imaging: see electronic medical records for all procedures, Xrays and details which were not copied into this note but were reviewed. Allergies: Allergies   Allergen Reactions    Sulfa (Sulfonamide Antibiotics) Hives       Home Medications:  Prior to Admission Medications   Prescriptions Last Dose Informant Patient Reported? Taking?   predniSONE (DELTASONE) 5 mg tablet   Yes No   Sig: Take 5 mg by mouth.       Facility-Administered Medications: None       Current Medications:  Current Facility-Administered Medications   Medication Dose Route Frequency    insulin glargine (LANTUS) injection 15 Units  15 Units SubCUTAneous DAILY    GENTAMICIN INFORMATION NOTE   Other PRN    epoetin dwaine (EPOGEN;PROCRIT) injection 10,000 Units  10,000 Units IntraVENous DIALYSIS TUE, THU & SAT    predniSONE (DELTASONE) tablet 5 mg  5 mg Oral DAILY WITH BREAKFAST    insulin lispro (HUMALOG) injection   SubCUTAneous AC&HS    Lactobacillus Acidoph & [facility]) tablet 2 Tab  2 Tab Oral BID    aspirin delayed-release tablet 81 mg  81 mg Oral DAILY    pravastatin (PRAVACHOL) tablet 20 mg  20 mg Oral QHS    sodium chloride (NS) flush 5-10 mL  5-10 mL IntraVENous PRN    doxercalciferol (HECTOROL) 4 mcg/2 mL injection 1 mcg  1 mcg IntraVENous DIALYSIS ONCE    glucose chewable tablet 16 g  4 Tab Oral PRN    glucagon (GLUCAGEN) injection 1 mg  1 mg IntraMUSCular PRN    dextrose (D50W) injection syrg 12.5-25 g  25-50 mL IntraVENous PRN    acetaminophen (TYLENOL) tablet 650 mg  650 mg Oral Q4H PRN    diphenhydrAMINE (BENADRYL) capsule 25 mg  25 mg Oral Q4H PRN    ondansetron (ZOFRAN) injection 4 mg  4 mg IntraVENous Q4H PRN    bisacodyl (DULCOLAX) tablet 10 mg  10 mg Oral DAILY PRN    heparin (porcine) injection 5,000 Units  5,000 Units SubCUTAneous Q12H    cinacalcet (SENSIPAR) tablet 30 mg  30 mg Oral DAILY WITH BREAKFAST    sevelamer carbonate (RENVELA) tab 1,600 mg  1,600 mg Oral TID WITH MEALS    B complex-vitaminC-folic acid (NEPHROCAP) cap  1 Cap Oral DAILY       Chart and notes reviewed. Data reviewed. I have evaluated and examined the patient.         IMPRESSION:   Patient Active Problem List   Diagnosis Code    UTI (urinary tract infection) N39.0    Hyperglycemia R73.9    Sepsis ([facility] A41.9    Acute febrile illness R50.9    ESRD on hemodialysis ([facility] [address] N18.6, Z99.2    Lupus (systemic lupus erythematosus) ([facility] [address] M32.9    DM type 2 (di[facility] mellitus, type 2[facility] E11.9 ·         PLAN:/DISCUSSION:   · [address], [place]  5/9/2017, 7:28 AM

## 2017-05-09 NOTE — PROGRESS NOTES
RENAL PROGRESS NOTE: Pt seen on dialysis. Follow up of ESRD     Present on Admission:   DM type 2 (diabetes mellitus, type 2) ([facility])         Subjective: Feels good, no SOB, appetite is better. Patient is on Dialysis. Objective:    Patient Vitals for the past 12 hrs:   Temp Pulse Resp BP SpO2   05/09/17 1130 - 97 15 105/58 -   05/09/17 1100 - 95 15 101/58 -   05/09/17 1030 - 64 16 105/65 -   05/09/17 1000 - 90 16 114/65 -   05/09/17 0943 - 86 18 110/54 -   05/09/17 0925 97.4 °F (36.3 °C) 63 18 113/70 -   05/09/17 0820 97.8 °F (36.6 °C) 95 20 125/69 95 %   05/09/17 0639 98.1 °F (36.7 °C) 97 20 138/73 95 %   05/08/17 2355 (!) 100.9 °F (38.3 °C) 92 20 148/68 94 %        Intake/Output Summary (Last 24 hours) at 05/09/17 1145  Last data filed at 05/09/17 0829   Gross per 24 hour   Intake              100 ml   Output                0 ml   Net              100 ml       Physical Assessment:     General Appearance: NAD  Lung: clear to auscultation  Heart: regular rate and rhythm and no murmurs, clicks or gallops  Lower Extremities: edema   Access: AVF, qb 400. Labs    CBC w/Diff    Recent Labs      05/09/17 0318 05/08/17 0333  05/07/17   0135   WBC  15.1*  15.2*  16.7*   RBC  2.66*  2.68*  2.91*   HGB  8.1*  8.2*  8.9*   HCT  25.4*  25.7*  27.5*   MCV  95.5  95.9  94.5   MCH  30.5  30.6  30.6   MCHC  31.9  31.9  32.4   RDW  13.1  13.1  13.3    Recent Labs      05/09/17 0318 05/08/17   0333  05/07/17   0135   BANDS   --   1   --    MONOS  4  7  2   EOS  4  5  3   BASOS  1  0  1   RDW  13.1  13.1  13.3        Comprehensive Metabolic Profile    Recent Labs      05/09/17 0318 05/08/17   0333  05/07/17   0135   NA  138  136  138   K  3.4*  3.2*  3.1*   CL  99*  99*  100   CO2  27  27  28   BUN  30*  18  11   CREA  9.33*  6.50*  4.05*    Recent Labs      05/09/17 0318 05/08/17 0333  05/07/17   0135   CA  8.4*  8.6  9.0   PHOS  3.9   --    --           Basic Metabolic Profile       results  reviwed. MEDS:Reviwed.   Current Facility-Administered Medications   Medication Dose Route Frequency Provider Last Rate Last Dose    insulin glargine (LANTUS) injection 20 Units  20 Units SubCUTAneous DAILY Chavez Winston MD        Gentamicin in 0.9% NaCl (GARAMYCIN) IVPB 120 mg  120 mg IntraVENous ONCE Tony Hansen MD        GENTAMICIN INFORMATION NOTE   Other PRN Tony Hansen MD        epoetin dwaine (EPOGEN;PROCRIT) injection 10,000 Units  10,000 Units IntraVENous DIALYSIS TUE, THU & SAT Sherie Shell MD   10,000 Units at 05/06/17 1129    predniSONE (DELTASONE) tablet 5 mg  5 mg Oral DAILY WITH BREAKFAST Jovon Slois MD   5 mg at 05/08/17 0800    insulin lispro (HUMALOG) injection   SubCUTAneous AC&HS Eva Gonzalez MD   6 Units at 05/08/17 2343    Lactobacillus Acidoph & Bulgar [facility]) tablet 2 Tab  2 Tab Oral BID Eva Gonzalez MD   2 Tab at 05/08/17 1800    aspirin delayed-release tablet 81 mg  81 mg Oral DAILY Eva Gonzalez MD   81 mg at 05/08/17 0900    pravastatin (PRAVACHOL) tablet 20 mg  20 mg Oral QHS Eva Gonzalez MD   20 mg at 05/08/17 2343    sodium chloride (NS) flush 5-10 mL  5-10 mL IntraVENous PRN Brian Harris MD   10 mL at 05/08/17 0409    doxercalciferol (HECTOROL) 4 mcg/2 mL injection 1 mcg  1 mcg IntraVENous DIALYSIS ONCE Sherie Shell MD   1 mcg at 05/06/17 1129    glucose chewable tablet 16 g  4 Tab Oral PRN Brian aHrris MD        glucagon [facility]) injection 1 mg  1 mg IntraMUSCular PRN Brian Harris MD        dextrose (D50W) injection syrg 12.5-25 g  25-50 mL IntraVENous PRN Brian Harris MD        acetaminophen (TYLENOL) tablet 650 mg  650 mg Oral Q4H PRN Jovon Solis MD   650 mg at 05/04/17 1055    diphenhydrAMINE (BENADRYL) capsule 25 mg  25 mg Oral Q4H PRN Jovon Solis MD        ondansetron [facility]) injection 4 mg  4 mg IntraVENous Q4H PRN oJvon Solis MD   4 mg at 05/03/17 1318    bisacodyl (DULCOLAX) tablet 10 mg  10 mg Oral DAILY PRN Osmar Vasquez MD        heparin (porcine) injection 5,000 Units  5,000 Units SubCUTAneous Q12H Osmar Vasquez MD   5,000 Units at 05/09/17 0629    cinacalcet (SENSIPAR) tablet 30 mg  30 mg Oral DAILY WITH BREAKFAST Meli Pringle MD   30 mg at 05/08/17 0800    sevelamer carbonate (RENVELA) tab 1,600 mg  1,600 mg Oral TID WITH MEALS Meli Pringle MD   1,600 mg at 05/08/17 1700    B complex-vitaminC-folic acid (NEPHROCAP) cap  1 Cap Oral DAILY Meli Pringle MD   1 Cap at 05/08/17 0900       Impression:    ESRD: Tolerating HD well. Anemia of CKD: on  Epogen. Hyperparathyroidism Yes  Sepsis   Plan  Dialysis for volume and solute management  Epogen  93131 units. Hectorol: 1 microgram.  Continue antibiotics as per ID.           Meli Pringle MD

## 2017-05-10 LAB
BACTERIA SPEC CULT: NORMAL
SERVICE CMNT-IMP: NORMAL

## 2017-05-10 NOTE — CDMP QUERY
Thank you Dr. Ana Maria Wheat   For your response  But these  Were  Assessed by Wound care as HAPI not POA    Please ammend if you agree. Wound care nurse has assessed this patient and noted the following  to be HAPI.           left  inner  buttocks    2x3    DTI      HAPI        right  inner  buttocks    3 x 6  DTI   HAPI        If you concur, please document in progress notes and d/c summary as HAPI        =>  Thank you,   Terrie Briggs   RN,  BSN,  [address]

## 2017-05-13 LAB
BACTERIA SPEC CULT: NORMAL
BACTERIA SPEC CULT: NORMAL
SERVICE CMNT-IMP: NORMAL
SERVICE CMNT-IMP: NORMAL

## 2017-05-31 ENCOUNTER — HOSPITAL ENCOUNTER (OUTPATIENT)
Dept: MAMMOGRAPHY | Age: 47
Discharge: HOME | End: 2017-05-31
Attending: INTERNAL MEDICINE
Payer: MEDICAID

## 2017-05-31 DIAGNOSIS — Z12.31 VISIT FOR SCREENING MAMMOGRAM: ICD-10-CM

## 2017-05-31 PROCEDURE — 77067 SCR MAMMO BI INCL CAD: CPT

## 2017-11-16 ENCOUNTER — APPOINTMENT (OUTPATIENT)
Dept: GENERAL RADIOLOGY | Age: 47
End: 2017-11-16
Attending: EMERGENCY MEDICINE
Payer: MEDICAID

## 2017-11-16 ENCOUNTER — HOSPITAL ENCOUNTER (EMERGENCY)
Age: 47
Discharge: HOME | End: 2017-11-16
Attending: EMERGENCY MEDICINE
Payer: MEDICAID

## 2017-11-16 VITALS
TEMPERATURE: 98 F | SYSTOLIC BLOOD PRESSURE: 111 MMHG | OXYGEN SATURATION: 98 % | WEIGHT: 145 LBS | DIASTOLIC BLOOD PRESSURE: 88 MMHG | RESPIRATION RATE: 26 BRPM | BODY MASS INDEX: 21.48 KG/M2 | HEIGHT: 69 IN | HEART RATE: 96 BPM

## 2017-11-16 DIAGNOSIS — R07.89 OTHER CHEST PAIN: Primary | ICD-10-CM

## 2017-11-16 LAB
ANION GAP SERPL CALC-SCNC: 8 MMOL/L (ref 3–18)
ATRIAL RATE: 92 BPM
BASOPHILS # BLD: 0.1 K/UL (ref 0–0.06)
BASOPHILS NFR BLD: 1 % (ref 0–2)
BUN SERPL-MCNC: 13 MG/DL (ref 7–18)
BUN/CREAT SERPL: 3 (ref 12–20)
CALCIUM SERPL-MCNC: 9.1 MG/DL (ref 8.5–10.1)
CALCULATED P AXIS, ECG09: 63 DEGREES
CALCULATED R AXIS, ECG10: -41 DEGREES
CALCULATED T AXIS, ECG11: 63 DEGREES
CHLORIDE SERPL-SCNC: 105 MMOL/L (ref 100–108)
CK MB CFR SERPL CALC: NORMAL % (ref 0–4)
CK MB CFR SERPL CALC: NORMAL % (ref 0–4)
CK MB SERPL-MCNC: <1 NG/ML (ref 5–25)
CK MB SERPL-MCNC: <1 NG/ML (ref 5–25)
CK SERPL-CCNC: 53 U/L (ref 26–192)
CK SERPL-CCNC: 69 U/L (ref 26–192)
CO2 SERPL-SCNC: 27 MMOL/L (ref 21–32)
CREAT SERPL-MCNC: 4.44 MG/DL (ref 0.6–1.3)
DIAGNOSIS, 93000: NORMAL
DIFFERENTIAL METHOD BLD: ABNORMAL
EOSINOPHIL # BLD: 0.5 K/UL (ref 0–0.4)
EOSINOPHIL NFR BLD: 4 % (ref 0–5)
ERYTHROCYTE [DISTWIDTH] IN BLOOD BY AUTOMATED COUNT: 14.8 % (ref 11.6–14.5)
GLUCOSE SERPL-MCNC: 201 MG/DL (ref 74–99)
HCT VFR BLD AUTO: 39.2 % (ref 35–45)
HGB BLD-MCNC: 12.1 G/DL (ref 12–16)
LYMPHOCYTES # BLD: 0.8 K/UL (ref 0.9–3.6)
LYMPHOCYTES NFR BLD: 6 % (ref 21–52)
MCH RBC QN AUTO: 29.6 PG (ref 24–34)
MCHC RBC AUTO-ENTMCNC: 30.9 G/DL (ref 31–37)
MCV RBC AUTO: 95.8 FL (ref 74–97)
MONOCYTES # BLD: 0.4 K/UL (ref 0.05–1.2)
MONOCYTES NFR BLD: 3 % (ref 3–10)
NEUTS SEG # BLD: 11.8 K/UL (ref 1.8–8)
NEUTS SEG NFR BLD: 86 % (ref 40–73)
P-R INTERVAL, ECG05: 130 MS
PLATELET # BLD AUTO: 231 K/UL (ref 135–420)
PMV BLD AUTO: 10.4 FL (ref 9.2–11.8)
POTASSIUM SERPL-SCNC: 3.7 MMOL/L (ref 3.5–5.5)
Q-T INTERVAL, ECG07: 378 MS
QRS DURATION, ECG06: 88 MS
QTC CALCULATION (BEZET), ECG08: 467 MS
RBC # BLD AUTO: 4.09 M/UL (ref 4.2–5.3)
SODIUM SERPL-SCNC: 140 MMOL/L (ref 136–145)
TROPONIN I SERPL-MCNC: <0.02 NG/ML (ref 0–0.04)
TROPONIN I SERPL-MCNC: <0.02 NG/ML (ref 0–0.04)
VENTRICULAR RATE, ECG03: 92 BPM
WBC # BLD AUTO: 13.5 K/UL (ref 4.6–13.2)

## 2017-11-16 PROCEDURE — 99285 EMERGENCY DEPT VISIT HI MDM: CPT

## 2017-11-16 PROCEDURE — 96374 THER/PROPH/DIAG INJ IV PUSH: CPT

## 2017-11-16 PROCEDURE — 74011250636 HC RX REV CODE- 250/636: Performed by: EMERGENCY MEDICINE

## 2017-11-16 PROCEDURE — 80048 BASIC METABOLIC PNL TOTAL CA: CPT | Performed by: EMERGENCY MEDICINE

## 2017-11-16 PROCEDURE — 82550 ASSAY OF CK (CPK): CPT | Performed by: EMERGENCY MEDICINE

## 2017-11-16 PROCEDURE — 85025 COMPLETE CBC W/AUTO DIFF WBC: CPT | Performed by: EMERGENCY MEDICINE

## 2017-11-16 PROCEDURE — 99284 EMERGENCY DEPT VISIT MOD MDM: CPT

## 2017-11-16 PROCEDURE — 93005 ELECTROCARDIOGRAM TRACING: CPT

## 2017-11-16 PROCEDURE — 71020 XR CHEST PA LAT: CPT

## 2017-11-16 RX ORDER — FENTANYL CITRATE 50 UG/ML
50 INJECTION, SOLUTION INTRAMUSCULAR; INTRAVENOUS
Status: COMPLETED | OUTPATIENT
Start: 2017-11-16 | End: 2017-11-16

## 2017-11-16 RX ADMIN — FENTANYL CITRATE 50 MCG: 50 INJECTION INTRAMUSCULAR; INTRAVENOUS at 15:34

## 2017-11-16 NOTE — ED TRIAGE NOTES
The patient presents with complaint of right side chest pain that began last night. She was at dialysis today and complained of the pain. The dialysis RN stopped her treatment one hour earlier due to the pain.

## 2017-11-16 NOTE — ED PROVIDER NOTES
HPI Comments: 12:56 PM Suzie Albert is a 50 y.o. female with h/o lupus, HTN, stroke, DM, ESRD, and CAD who presents to ED via EMS complaining of right sided CP, arm pain, and back pain for the past 2 days. The pt says her pain is exacerbated with movement, nothing makes her pain feel better. The pt says she has treated her pain with any OTC medications. The pt reports she is on dialysis. The pt denies usage of blood thinners, hx blood clots, SOB, weakness, numbness, HA, and fever. The pt had no other complaints or concerns while presenting to the ED. PCP: Britany Hinson MD      The history is provided by the patient. No  was used. Past Medical History:   Diagnosis Date    CAD (coronary artery disease)     Diabetes [facility]     ESRD (end stage renal disease) on dialysis ([facility] [address]     MONDAY, WEDNESDAY, FRIDAY    Hypertension     Lupus ([facility] [facility]     Stroke ([facility] [facility]        Past Surgical History:   Procedure Laterality Date    HX  SECTION      HX VASCULAR ACCESS           No family history on file. Social History     Social History    Marital status:      Spouse name: N/A    Number of children: N/A    Years of education: N/A     Occupational History    Not on file. Social History Main Topics    Smoking status: Never Smoker    Smokeless tobacco: Never Used    Alcohol use No    Drug use: No    Sexual activity: Not on file     Other Topics Concern    Not on file     Social History Narrative         ALLERGIES: Sulfa (sulfonamide antibiotics)    Review of Systems   All other systems reviewed and are negative. There were no vitals filed for this visit. Physical Exam   Constitutional: She is oriented to person, place, and time. She appears well-developed. HENT:   Head: Normocephalic and atraumatic. Eyes: EOM are normal. Pupils are equal, round, and reactive to light. Neck: Normal range of motion. Neck supple.    Cardiovascular: Normal rate, regular rhythm and normal heart sounds. Exam reveals no friction rub. No murmur heard. Pulmonary/Chest: Effort normal and breath sounds normal. No respiratory distress. She has no wheezes. Abdominal: Soft. She exhibits no distension. There is no tenderness. There is no rebound and no guarding. Musculoskeletal: Normal range of motion. Neurological: She is alert and oriented to person, place, and time. Skin: Skin is warm and dry. Psychiatric: She has a normal mood and affect. Her behavior is normal. Thought content normal.        MDM  Number of Diagnoses or Management Options  Diagnosis management comments:   No EKGs show sinus  At 92 with left axis normal intervals no ST elevation or depression or hypertrophy 49-year-old female presents to days of right-sided chest pain that is worse with movement better with rest neurological vomiting or diaphoresis. She is on dialysis due to lupus. No history of blood clots. EKG #2 no change. cxr napd . Trop neg x2;   Pt feels better; consider PE but sx completely resolved. Will refer back to pcp; hours of pain and trop neg. ED Course       Procedures     4:29 PM PT reports that she is feeling better while in the ED. Naomy [address] acting as a scribe for and in the presence of Elaine Ledezma MD      November 16, 2017 at 12:56 PM       Provider Attestation:      I personally performed the services described in the documentation, reviewed the documentation, as recorded by the scribe in my presence, and it accurately and completely records my words and actions.  November 16, 2017 at 12:56 PM - Elaine Ledezma MD

## 2017-11-16 NOTE — ED NOTES
I have reviewed discharge instructions with the patient. The patient verbalized understanding. Awaiting for her home health aide to come and take her home.

## 2017-11-16 NOTE — DISCHARGE INSTRUCTIONS
Chest Pain: Care Instructions  Your Care Instructions    There are many things that can cause chest pain. Some are not serious and will get better on their own in a few days. But some kinds of chest pain need more testing and treatment. Your doctor may have recommended a follow-up visit in the next 8 to 12 hours. If you are not getting better, you may need more tests or treatment. Even though your doctor has released you, you still need to watch for any problems. The doctor carefully checked you, but sometimes problems can develop later. If you have new symptoms or if your symptoms do not get better, get medical care right away. If you have worse or different chest pain or pressure that lasts more than 5 minutes or you passed out (lost consciousness), call 911 or seek other emergency help right away. A medical visit is only one step in your treatment. Even if you feel better, you still need to do what your doctor recommends, such as going to all suggested follow-up appointments and taking medicines exactly as directed. This will help you recover and help prevent future problems. How can you care for yourself at home? · Rest until you feel better. · Take your medicine exactly as prescribed. Call your doctor if you think you are having a problem with your medicine. · Do not drive after taking a prescription pain medicine. When should you call for help? Call 911 if:  ? · You passed out (lost consciousness). ? · You have severe difficulty breathing. ? · You have symptoms of a heart attack. These may include:  ¨ Chest pain or pressure, or a strange feeling in your chest.  ¨ Sweating. ¨ Shortness of breath. ¨ Nausea or vomiting. ¨ Pain, pressure, or a strange feeling in your back, neck, jaw, or upper belly or in one or both shoulders or arms. ¨ Lightheadedness or sudden weakness. ¨ A fast or irregular heartbeat.   After you call 911, the  may tell you to chew 1 adult-strength or 2 to 4 low-dose aspirin. Wait for an ambulance. Do not try to drive yourself. ?Call your doctor today if:  ? · You have any trouble breathing. ? · Your chest pain gets worse. ? · You are dizzy or lightheaded, or you feel like you may faint. ? · You are not getting better as expected. ? · You are having new or different chest pain. Where can you learn more? Go to http://tessa-marcelle.info/. Enter A120 in the search box to learn more about \"Chest Pain: Care Instructions. \"  Current as of: March 20, 2017  Content Version: 11.4  © 3695-8719 [employer]. Care instructions adapted under license by [employer] (which disclaims liability or warranty for this information). If you have questions about a medical condition or this instruction, always ask your healthcare professional. [address] any warranty or liability for your use of this information.

## 2017-11-17 LAB
ATRIAL RATE: 88 BPM
CALCULATED P AXIS, ECG09: 34 DEGREES
CALCULATED R AXIS, ECG10: -41 DEGREES
CALCULATED T AXIS, ECG11: 48 DEGREES
DIAGNOSIS, 93000: NORMAL
P-R INTERVAL, ECG05: 134 MS
Q-T INTERVAL, ECG07: 398 MS
QRS DURATION, ECG06: 90 MS
QTC CALCULATION (BEZET), ECG08: 481 MS
VENTRICULAR RATE, ECG03: 88 BPM

## 2018-03-24 PROBLEM — M48.50XA SPINAL COMPRESSION FRACTURE (HCC): Status: ACTIVE | Noted: 2018-03-24

## 2018-04-11 PROBLEM — R09.02 HYPOXIA: Status: ACTIVE | Noted: 2018-04-11

## 2018-04-11 PROBLEM — Z99.2 END STAGE RENAL DISEASE ON DIALYSIS (HCC): Status: ACTIVE | Noted: 2018-04-11

## 2018-04-11 PROBLEM — E21.0 HYPERPARATHYROID BONE DISEASE (HCC): Chronic | Status: ACTIVE | Noted: 2018-04-11

## 2018-04-11 PROBLEM — N18.6 END STAGE RENAL DISEASE ON DIALYSIS (HCC): Status: ACTIVE | Noted: 2018-04-11

## 2018-04-11 PROBLEM — J06.9 ACUTE UPPER RESPIRATORY INFECTION: Status: ACTIVE | Noted: 2018-04-11

## 2018-04-11 PROBLEM — I50.31 CHF (CONGESTIVE HEART FAILURE), NYHA CLASS IV, ACUTE, DIASTOLIC (HCC): Status: ACTIVE | Noted: 2018-04-11

## 2018-04-11 PROBLEM — E21.0: Status: ACTIVE | Noted: 2018-03-27

## 2018-05-12 PROBLEM — R07.9 CHEST PAIN: Status: ACTIVE | Noted: 2018-05-12

## 2018-07-20 ENCOUNTER — HOSPITAL ENCOUNTER (OUTPATIENT)
Dept: MAMMOGRAPHY | Age: 48
Discharge: HOME | End: 2018-07-20
Attending: INTERNAL MEDICINE
Payer: MEDICAID

## 2018-07-20 DIAGNOSIS — Z12.31 VISIT FOR SCREENING MAMMOGRAM: ICD-10-CM

## 2018-07-20 PROCEDURE — 77067 SCR MAMMO BI INCL CAD: CPT

## 2018-08-01 PROBLEM — R07.2 PRECORDIAL CHEST PAIN: Status: ACTIVE | Noted: 2018-08-01

## 2018-09-22 ENCOUNTER — APPOINTMENT (OUTPATIENT)
Dept: CT IMAGING | Age: 48
End: 2018-09-22
Attending: EMERGENCY MEDICINE
Payer: MEDICAID

## 2018-09-22 ENCOUNTER — HOSPITAL ENCOUNTER (EMERGENCY)
Age: 48
Discharge: HOME | End: 2018-09-22
Attending: EMERGENCY MEDICINE
Payer: MEDICAID

## 2018-09-22 ENCOUNTER — APPOINTMENT (OUTPATIENT)
Dept: GENERAL RADIOLOGY | Age: 48
End: 2018-09-22
Attending: EMERGENCY MEDICINE
Payer: MEDICAID

## 2018-09-22 VITALS
TEMPERATURE: 97.9 F | WEIGHT: 146 LBS | BODY MASS INDEX: 21.62 KG/M2 | SYSTOLIC BLOOD PRESSURE: 116 MMHG | DIASTOLIC BLOOD PRESSURE: 56 MMHG | HEART RATE: 84 BPM | HEIGHT: 69 IN | RESPIRATION RATE: 18 BRPM | OXYGEN SATURATION: 98 %

## 2018-09-22 DIAGNOSIS — M54.9 BACK PAIN, UNSPECIFIED BACK LOCATION, UNSPECIFIED BACK PAIN LATERALITY, UNSPECIFIED CHRONICITY: Primary | ICD-10-CM

## 2018-09-22 LAB
ATRIAL RATE: 78 BPM
CALCULATED P AXIS, ECG09: 57 DEGREES
CALCULATED R AXIS, ECG10: -24 DEGREES
CALCULATED T AXIS, ECG11: 90 DEGREES
DIAGNOSIS, 93000: NORMAL
P-R INTERVAL, ECG05: 132 MS
Q-T INTERVAL, ECG07: 384 MS
QRS DURATION, ECG06: 90 MS
QTC CALCULATION (BEZET), ECG08: 437 MS
VENTRICULAR RATE, ECG03: 78 BPM

## 2018-09-22 PROCEDURE — 71045 X-RAY EXAM CHEST 1 VIEW: CPT

## 2018-09-22 PROCEDURE — 74011000250 HC RX REV CODE- 250: Performed by: EMERGENCY MEDICINE

## 2018-09-22 PROCEDURE — 99285 EMERGENCY DEPT VISIT HI MDM: CPT

## 2018-09-22 PROCEDURE — 93005 ELECTROCARDIOGRAM TRACING: CPT

## 2018-09-22 PROCEDURE — 72128 CT CHEST SPINE W/O DYE: CPT

## 2018-09-22 PROCEDURE — 72125 CT NECK SPINE W/O DYE: CPT

## 2018-09-22 PROCEDURE — 74011250637 HC RX REV CODE- 250/637: Performed by: EMERGENCY MEDICINE

## 2018-09-22 RX ORDER — LIDOCAINE 4 G/100G
1 PATCH TOPICAL
Status: DISCONTINUED | OUTPATIENT
Start: 2018-09-22 | End: 2018-09-22 | Stop reason: HOSPADM

## 2018-09-22 RX ORDER — OXYCODONE AND ACETAMINOPHEN 5; 325 MG/1; MG/1
1 TABLET ORAL
Qty: 12 TAB | Refills: 0 | Status: SHIPPED | OUTPATIENT
Start: 2018-09-22 | End: 2018-10-06

## 2018-09-22 RX ORDER — OXYCODONE AND ACETAMINOPHEN 5; 325 MG/1; MG/1
1 TABLET ORAL ONCE
Status: COMPLETED | OUTPATIENT
Start: 2018-09-22 | End: 2018-09-22

## 2018-09-22 RX ORDER — LIDOCAINE 50 MG/G
PATCH TOPICAL
Qty: 15 EACH | Refills: 0 | Status: SHIPPED | OUTPATIENT
Start: 2018-09-22 | End: 2020-01-17

## 2018-09-22 RX ORDER — CLONIDINE HYDROCHLORIDE 0.1 MG/1
0.2 TABLET ORAL
Status: COMPLETED | OUTPATIENT
Start: 2018-09-22 | End: 2018-09-22

## 2018-09-22 RX ADMIN — OXYCODONE HYDROCHLORIDE AND ACETAMINOPHEN 1 TABLET: 5; 325 TABLET ORAL at 12:30

## 2018-09-22 RX ADMIN — CLONIDINE HYDROCHLORIDE 0.2 MG: 0.1 TABLET ORAL at 12:30

## 2018-09-22 NOTE — ED NOTES
I have reviewed discharge instructions with the patient and caregiver. The patient and caregiver verbalized understanding. Patient armband removed and shredded, scripts x 2 given

## 2018-09-22 NOTE — ED TRIAGE NOTES
Pt complaining of worsening upper back and neck pain x 3 days. Pt was at dialysis and only able to complete 1 hour because of pain.

## 2018-09-22 NOTE — Clinical Note
Your evaluation today showed neck pain and back pain. Please follow up with a doctor as discussed. Please follow-up with Dr. Matt Russo for early dialysis on Monday, if possible. The evaluation and treatment done today requires that you follow up with a phys ician for re-evaluation. Medical problems can change over time and symptoms can get worse or new symptoms can develop over time, therefore, it is important that you follow up as we discussed. Please immediately return to the ER if you have any concerns . Call the ER if you have any questions about what we discussed. At the [facility] Emergency Department we are genuinely concerned about your health and comfort. You may be selected to participate in a patient satisfaction survey mail ed to your home. We are excited about the opportunity to learn from your experience so we may continue to improve. In striving for the very best we believe good is not good enough, but if you rate us as EXCELLENT in all boxes, we have succeeded. We David sifuentes to provide EXCELLENT care to you and your family. We appreciate the opportunity to take care of you, and hope you do well.

## 2018-09-22 NOTE — ED PROVIDER NOTES
EMERGENCY DEPARTMENT HISTORY AND PHYSICAL EXAM 
 
12:09 PM 
 
Date: 9/22/2018 Patient Name: Larry Clemons History of Presenting Illness Chief Complaint Patient presents with  Back Pain Chief Complaint: Back pain History Provided By: Patient Additional History (Context): Larry Clemons is a 46 y.o. female with PMHx of lupus, stroke, HTN, diabetes, ESRD, and CAD who presents with neck/back pain. 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 Pt states there is no change to the nature of the pain but that it suddenly worsened this AM. There is no CP or SOB assoc with it. She was able to get through an hour of dialysis before she had to come here for eval. She has no pain medicine for it currently. [facility]CP: Lynn Szymanski MD 
 
Current Facility-Administered Medications Medication Dose Route Frequency Provider Last Rate Last Dose  lidocaine 4 % patch 1 Patch  1 Patch TransDERmal NOW Kylee Marie MD   1 Patch at 09/22/18 2761 Current Outpatient Prescriptions Medication Sig Dispense Refill  oxyCODONE-acetaminophen (PERCOCET) 5-325 mg per tablet Take 1 Tab by mouth every four (4) hours as needed for Pain. Max Daily Amount: 6 Tabs. 12 Tab 0  
 lidocaine (LIDODERM) 5 % Apply patch to the affected area for 12 hours a day and remove for 12 hours a day. 15 Each 0  
 methocarbamol (ROBAXIN) 500 mg tablet Take 1 Tab by mouth four (4) times daily. 8 Tab 0  
 cinacalcet (SENSIPAR) 30 mg tablet Take 30 mg by mouth daily.  insulin glargine (LANTUS) 100 unit/mL injection 8 units hs  Indications: type 2 diabetes mellitus 1 Vial 0  
 ergocalciferol (ERGOCALCIFEROL) 50,000 unit capsule Take 1 Cap by mouth every seven (7) days. 4 Cap 0  
 diphenhydrAMINE (BENADRYL) 25 mg capsule Take 25 mg by mouth two (2) times a day.     
 calcium acetate (PHOSLO) 667 mg cap Take 1 Cap by mouth three (3) times daily (with meals).  b complex-vitamin c-folic acid 0.8 mg (NEPHRO-MELODY) 0.8 mg tab tablet Take 1 Tab by mouth daily.  amLODIPine (NORVASC) 5 mg tablet Take 5 mg by mouth daily.  acetaminophen (TYLENOL) 325 mg tablet Take 2 Tabs by mouth every four (4) hours as needed. 100 Tab 0  
 aspirin delayed-release 81 mg tablet Take 1 Tab by mouth daily. 100 Tab 0  pravastatin (PRAVACHOL) 20 mg tablet Take 1 Tab by mouth nightly. 100 Tab 0  predniSONE (DELTASONE) 5 mg tablet Take 5 mg by mouth daily. Duration: 3 Days Timing:  Constant and Worsening Location: upper back and neck Quality: Aching Severity: Moderate Modifying Factors: None Associated Symptoms: None Past History Past Medical History: 
Past Medical History:  
Diagnosis Date  Anemia due to chronic kidney disease Mary Frame tumor due to hyperparathyroidism ([facility] [address] 2018  
 s/p bone biopsy, multiple lytic lesions  CAD (coronary artery disease)  Diabetes ([address]  ESRD (end stage renal disease) on dialysis ([address] 2012  
 due to SLE, HD TTS at [facility] with Dr. Ant Morrow  Hyperparathyroidism due to renal insufficiency ([address]  Hypertension  Lupus  Stroke ([facility] [address] Past Surgical History: 
Past Surgical History:  
Procedure Laterality Date  HX  SECTION    
 HX VASCULAR ACCESS Family History: 
History reviewed. No pertinent family history. Social History: 
Social History Substance Use Topics  Smoking status: Never Smoker  Smokeless tobacco: Never Used  Alcohol use No  
 
 
Allergies: Allergies Allergen Reactions  Sulfa (Sulfonamide Antibiotics) Hives Review of Systems Review of Systems Respiratory: Negative for shortness of breath. Cardiovascular: Negative for chest pain. Musculoskeletal: Positive for back pain and neck pain. All other systems reviewed and are negative.  
 
 
 
Physical Exam  
 
 Patient Vitals for the past 12 hrs: 
 Temp Pulse Resp BP SpO2  
09/22/18 1215 - - - 196/72 100 % 09/22/18 1136 97.9 °F (36.6 °C) 84 18 (!) 141/106 98 % Physical Exam  
Constitutional: She is oriented to person, place, and time. She appears well-developed. HENT:  
Head: Normocephalic and atraumatic. Eyes: Conjunctivae and EOM are normal.  
Neck: Normal range of motion. Mild diffuse tenderness throughout entire upper back and lower neck. No focal areas of tenderness, step offs, warmth/erythema, or crepitance. Cardiovascular: Normal heart sounds. Exam reveals no gallop and no friction rub. No murmur heard. Pulmonary/Chest: Effort normal and breath sounds normal. No stridor. No respiratory distress. Abdominal: Soft. There is no tenderness. Musculoskeletal: Normal range of motion. She exhibits no tenderness. Neurological: She is alert and oriented to person, place, and time. Skin: Skin is warm and dry. She is not diaphoretic. Psychiatric: She has a normal mood and affect. Her behavior is normal.  
Nursing note and vitals reviewed. Diagnostic Study Results Labs - Recent Results (from the past 12 hour(s)) EKG, 12 LEAD, INITIAL Collection Time: 09/22/18 12:24 PM  
Result Value Ref Range Ventricular Rate 78 BPM  
 Atrial Rate 78 BPM  
 P-R Interval 132 ms QRS Duration 90 ms Q-T Interval 384 ms QTC Calculation (Bezet) 437 ms Calculated P Axis 57 degrees Calculated R Axis -24 degrees Calculated T Axis 90 degrees Diagnosis Normal sinus rhythm Possible Left atrial enlargement Nonspecific T wave abnormality Abnormal ECG When compared with ECG of 16-NOV-2017 16:52, Nonspecific T wave abnormality, worse in Lateral leads Confirmed by Concepcion Irene ([id number]) on 9/22/2018 2:51:06 PM 
  
 
 
Radiologic Studies -  
CT SPINE [facility] WO CONT Final Result CT SPINE CERV WO CONT Final Result XR CHEST PORT    (Results Pending) Medical Decision Making ED Course: Progress Notes, ED consults- 
 
3:55 PM, 9/22/2018 Consult:  Discussed care with Dr. Anshul Bauman (Nephrologist). Standard discussion; including history of patients chief complaint, available diagnostic results, and treatment course. I updated him on the patient. He agrees to see the patient on Monday for earlier dialysis session if possible. 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 There is no fever, numbness/tingling. There are no high risk signs or symptoms, no history of intravenous drug abuse, no history of cancer, no new weakness, and no history of bowel or bladder incontinence. Vital signs show normal heart rate, BP, and oxygen saturation on room air and  on exam, the patient had no midline tenderness. No step-off. No rash. 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 CT was done which shows the above results. After analgesia, the pt is now back to her baseline motor/sensory function and is completely pain free now. She also does not appear fluid overloaded and in need of addutional dialysis after today's session, but we have circled back to Dr. Anshul Bauman to make sure she gets it urgently on Monday if possible. Overall, the patient is in excellent condition. No distress. Nontoxic-appearing. Appears comfortable. They will be discharged with instructions to return if increasing pain, weakness, fevers, or new symptoms. I encouraged follow-up with their primary care physician for repeat exam in 24-48 hours to monitor clinical course, and have referred her to Dr. Jori Verdugo for spine follow up.  We checked with the patient if there were any questions or concerns about the care and discharge instructions. Will give her sling, lido patch for conservative pain control and break through percocets PRN. The patient and her family seems satisfied with the care and appears to understand the discharge instructions. Vital Signs-Reviewed the patient's vital signs. Pulse Oximetry Analysis -  100% on room air (Interpretation) normal 
 
EKG: Interpreted by the EP. Time Interpreted: 0718 Rate: 78 Rhythm: Normal Sinus Rhythm Interpretation: nonspec STT, QTc 437 Comparison:  
 
Records Reviewed: Nursing Notes (Time of Review: 12:09 PM) 
-I am the first provider for this patient. 
-I reviewed the vital signs, available nursing notes, past medical history, past surgical history, family history and social history. Diagnosis Clinical Impression: 1. Back pain, unspecified back location, unspecified back pain laterality, unspecified chronicity Disposition: Discharged Follow-up Information Follow up With Details Comments Contact Info Lalo Jones MD Call in 1 day For follow-up for diaysis treatment on Monday [address] [facility] [address] [phone] Carol Sandhu MD Schedule an appointment as soon as possible for a visit in 2 days For follow-up for spine lesions [address] [facility] [address] 
[phone] [facility] EMERGENCY DEPT Go to As needed, If symptoms worsen [address] [place] [address] [address] Patient's Medications Start Taking LIDOCAINE (LIDODERM) 5 %    Apply patch to the affected area for 12 hours a day and remove for 12 hours a day. OXYCODONE-ACETAMINOPHEN (PERCOCET) 5-325 MG PER TABLET    Take 1 Tab by mouth every four (4) hours as needed for Pain. Max Daily Amount: 6 Tabs. Continue Taking ACETAMINOPHEN (TYLENOL) 325 MG TABLET    Take 2 Tabs by mouth every four (4) hours as needed. AMLODIPINE (NORVASC) 5 MG TABLET    Take 5 mg by mouth daily. ASPIRIN DELAYED-RELEASE 81 MG TABLET    Take 1 Tab by mouth daily. B COMPLEX-VITAMIN C-FOLIC ACID 0.8 MG (NEPHRO-MELODY) 0.8 MG TAB TABLET    Take 1 Tab by mouth daily. CALCIUM ACETATE (PHOSLO) 667 MG CAP    Take 1 Cap by mouth three (3) times daily (with meals). CINACALCET (SENSIPAR) 30 MG TABLET    Take 30 mg by mouth daily. DIPHENHYDRAMINE (BENADRYL) 25 MG CAPSULE    Take 25 mg by mouth two (2) times a day. ERGOCALCIFEROL (ERGOCALCIFEROL) 50,000 UNIT CAPSULE    Take 1 Cap by mouth every seven (7) days. INSULIN GLARGINE (LANTUS) 100 UNIT/ML INJECTION    8 units hs  Indications: type 2 diabetes mellitus METHOCARBAMOL (ROBAXIN) 500 MG TABLET    Take 1 Tab by mouth four (4) times daily. PRAVASTATIN (PRAVACHOL) 20 MG TABLET    Take 1 Tab by mouth nightly. PREDNISONE (DELTASONE) 5 MG TABLET    Take 5 mg by mouth daily. These Medications have changed No medications on file Stop Taking No medications on file  
 
_______________________________ Attestations: 
Scribe Attestation Blayne Dalton acting as a scribe for and in the presence of Luis Stevens MD     
September 22, 2018 at 12:09 PM 
    
Provider Attestation:     
I personally performed the services described in the documentation, reviewed the documentation, as recorded by the scribe in my presence, and it accurately and completely records my words and actions. September 22, 2018 at 12:09 PM - Luis Stevens MD   
_______________________________

## 2018-10-06 PROBLEM — I24.8 DEMAND ISCHEMIA OF MYOCARDIUM (HCC): Status: ACTIVE | Noted: 2018-10-06

## 2018-10-06 PROBLEM — N18.6 END STAGE RENAL DISEASE (HCC): Status: ACTIVE | Noted: 2018-10-06

## 2018-10-06 PROBLEM — I50.31 CHF (CONGESTIVE HEART FAILURE), NYHA CLASS I, ACUTE, DIASTOLIC (HCC): Status: ACTIVE | Noted: 2018-10-06

## 2018-10-06 PROBLEM — R06.02 SHORTNESS OF BREATH: Status: ACTIVE | Noted: 2018-10-06

## 2018-10-09 PROBLEM — I24.8 DEMAND ISCHEMIA OF MYOCARDIUM (HCC): Status: RESOLVED | Noted: 2018-10-06 | Resolved: 2018-10-09

## 2018-10-09 PROBLEM — N39.0 UTI (URINARY TRACT INFECTION): Status: RESOLVED | Noted: 2017-05-02 | Resolved: 2018-10-09

## 2018-10-09 PROBLEM — R73.9 HYPERGLYCEMIA: Status: RESOLVED | Noted: 2017-05-02 | Resolved: 2018-10-09

## 2018-10-09 PROBLEM — I50.31 CHF (CONGESTIVE HEART FAILURE), NYHA CLASS I, ACUTE, DIASTOLIC (HCC): Status: RESOLVED | Noted: 2018-10-06 | Resolved: 2018-10-09

## 2018-10-09 PROBLEM — R06.02 SHORTNESS OF BREATH: Status: RESOLVED | Noted: 2018-10-06 | Resolved: 2018-10-09

## 2018-10-09 PROBLEM — R50.9 ACUTE FEBRILE ILLNESS: Status: RESOLVED | Noted: 2017-05-02 | Resolved: 2018-10-09

## 2018-10-09 PROBLEM — A41.9 SEPSIS (HCC): Status: RESOLVED | Noted: 2017-05-02 | Resolved: 2018-10-09

## 2018-12-04 PROBLEM — D64.9 ANEMIA: Status: ACTIVE | Noted: 2018-12-04

## 2018-12-04 PROBLEM — N17.9 RENAL FAILURE (ARF), ACUTE ON CHRONIC (HCC): Status: ACTIVE | Noted: 2018-12-04

## 2018-12-04 PROBLEM — R10.9 ABDOMINAL PAIN: Status: ACTIVE | Noted: 2018-12-04

## 2018-12-04 PROBLEM — K57.92 ACUTE DIVERTICULITIS: Status: ACTIVE | Noted: 2018-12-04

## 2018-12-04 PROBLEM — N18.9 RENAL FAILURE (ARF), ACUTE ON CHRONIC (HCC): Status: ACTIVE | Noted: 2018-12-04

## 2018-12-04 PROBLEM — K52.9 COLITIS, ACUTE: Status: ACTIVE | Noted: 2018-12-04

## 2018-12-10 PROBLEM — K57.92 ACUTE DIVERTICULITIS: Status: RESOLVED | Noted: 2018-12-04 | Resolved: 2018-12-10

## 2018-12-10 PROBLEM — K52.9 COLITIS, ACUTE: Status: RESOLVED | Noted: 2018-12-04 | Resolved: 2018-12-10

## 2018-12-20 ENCOUNTER — APPOINTMENT (OUTPATIENT)
Dept: CT IMAGING | Age: 48
End: 2018-12-20
Attending: NURSE PRACTITIONER
Payer: MEDICAID

## 2018-12-20 ENCOUNTER — HOSPITAL ENCOUNTER (EMERGENCY)
Age: 48
Discharge: HOME | End: 2018-12-20
Attending: EMERGENCY MEDICINE
Payer: MEDICAID

## 2018-12-20 VITALS
HEART RATE: 80 BPM | TEMPERATURE: 97.8 F | RESPIRATION RATE: 20 BRPM | DIASTOLIC BLOOD PRESSURE: 73 MMHG | OXYGEN SATURATION: 99 % | SYSTOLIC BLOOD PRESSURE: 154 MMHG

## 2018-12-20 DIAGNOSIS — S00.83XA TRAUMATIC HEMATOMA OF FOREHEAD, INITIAL ENCOUNTER: ICD-10-CM
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
## 2018-12-20 PROCEDURE — 99284 EMERGENCY DEPT VISIT MOD MDM: CPT

## 2018-12-20 PROCEDURE — 74011250637 HC RX REV CODE- 250/637: Performed by: PHYSICIAN ASSISTANT

## 2018-12-20 PROCEDURE — 70450 CT HEAD/BRAIN W/O DYE: CPT

## 2018-12-20 PROCEDURE — 93005 ELECTROCARDIOGRAM TRACING: CPT

## 2018-12-20 RX ORDER — ACETAMINOPHEN 500 MG
1000 TABLET ORAL
Status: COMPLETED | OUTPATIENT
Start: 2018-12-20 | End: 2018-12-20

## 2018-12-20 RX ADMIN — ACETAMINOPHEN 1000 MG: 500 TABLET ORAL at 18:19

## 2018-12-20 NOTE — DISCHARGE INSTRUCTIONS
Preventing Falls: Care Instructions  Your Care Instructions    Getting around your home safely can be a challenge if you have injuries or health problems that make it easy for you to fall. Loose rugs and furniture in walkways are among the dangers for many older people who have problems walking or who have poor eyesight. People who have conditions such as arthritis, osteoporosis, or dementia also have to be careful not to fall. You can make your home safer with a few simple measures. Follow-up care is a key part of your treatment and safety. Be sure to make and go to all appointments, and call your doctor if you are having problems. It's also a good idea to know your test results and keep a list of the medicines you take. How can you care for yourself at home? Taking care of yourself  · You may get dizzy if you do not drink enough water. To prevent dehydration, drink plenty of fluids, enough so that your urine is light yellow or clear like water. Choose water and other caffeine-free clear liquids. If you have kidney, heart, or liver disease and have to limit fluids, talk with your doctor before you increase the amount of fluids you drink. · Exercise regularly to improve your strength, muscle tone, and balance. Walk if you can. Swimming may be a good choice if you cannot walk easily. · Have your vision and hearing checked each year or any time you notice a change. If you have trouble seeing and hearing, you might not be able to avoid objects and could lose your balance. · Know the side effects of the medicines you take. Ask your doctor or pharmacist whether the medicines you take can affect your balance. Sleeping pills or sedatives can affect your balance. · Limit the amount of alcohol you drink. Alcohol can impair your balance and other senses. · Ask your doctor whether calluses or corns on your feet need to be removed.  If you wear loose-fitting shoes because of calluses or corns, you can lose your balance and fall. · Talk to your doctor if you have numbness in your feet. Preventing falls at home  · Remove raised doorway thresholds, throw rugs, and clutter. Repair loose carpet or raised areas in the floor. · Move furniture and electrical cords to keep them out of walking paths. · Use nonskid floor wax, and wipe up spills right away, especially on ceramic tile floors. · If you use a walker or cane, put rubber tips on it. If you use crutches, clean the bottoms of them regularly with an abrasive pad, such as steel wool. · Keep your house well lit, especially Lawson Pershing, and outside walkways. Use night-lights in areas such as hallways and bathrooms. Add extra light switches or use remote switches (such as switches that go on or off when you clap your hands) to make it easier to turn lights on if you have to get up during the night. · Install sturdy handrails on stairways. · Move items in your cabinets so that the things you use a lot are on the lower shelves (about waist level). · Keep a cordless phone and a flashlight with new batteries by your bed. If possible, put a phone in each of the main rooms of your house, or carry a cell phone in case you fall and cannot reach a phone. Or, you can wear a device around your neck or wrist. You push a button that sends a signal for help. · Wear low-heeled shoes that fit well and give your feet good support. Use footwear with nonskid soles. Check the heels and soles of your shoes for wear. Repair or replace worn heels or soles. · Do not wear socks without shoes on wood floors. · Walk on the grass when the sidewalks are slippery. If you live in an area that gets snow and ice in the winter, sprinkle salt on slippery steps and sidewalks. Preventing falls in the bath  · Install grab bars and nonskid mats inside and outside your shower or tub and near the toilet and sinks. · Use shower chairs and bath benches.   · Use a hand-held shower head that will allow you to sit while showering. · Get into a tub or shower by putting the weaker leg in first. Get out of a tub or shower with your strong side first.  · Repair loose toilet seats and consider installing a raised toilet seat to make getting on and off the toilet easier. · Keep your bathroom door unlocked while you are in the shower. Where can you learn more? Go to http://tessa-marcelle.info/. Enter 0476 [id number] 69 [id number] in the search box to learn more about \"Preventing Falls: Care Instructions. \"  Current as of: March 16, 2018  Content Version: 11.8  © 3007-2979 [employer]. Care instructions adapted under license by [employer] (which disclaims liability or warranty for this information). If you have questions about a medical condition or this instruction, always ask your healthcare professional. [place] [address] any warranty or liability for your use of this information. Head Injury: Care Instructions  Your Care Instructions    Most injuries to the head are minor. Bumps, cuts, and scrapes on the head and face usually heal well and can be treated the same as injuries to other parts of the body. Although it's rare, once in a while a more serious problem shows up after you are home. So it's good to be on the lookout for symptoms for a day or two. Follow-up care is a key part of your treatment and safety. Be sure to make and go to all appointments, and call your doctor if you are having problems. It's also a good idea to know your test results and keep a list of the medicines you take. How can you care for yourself at home? · Follow your doctor's instructions. He or she will tell you if you need someone to watch you closely for the next 24 hours or longer. · Take it easy for the next few days or more if you are not feeling well. · Ask your doctor when it's okay for you to go back to activities like driving a car, riding a bike, or operating machinery.   When should you call for help? Call 911 anytime you think you may need emergency care. For example, call if:    · You have a seizure.     · You passed out (lost consciousness).     · You are confused or can't stay awake.    Call your doctor now or seek immediate medical care if:    · You have new or worse vomiting.     · You feel less alert.     · You have new weakness or numbness in any part of your body.    Watch closely for changes in your health, and be sure to contact your doctor if:    · You do not get better as expected.     · You have new symptoms, such as headaches, trouble concentrating, or changes in mood. Where can you learn more? Go to http://tessa-marcelle.info/. Enter G096 in the search box to learn more about \"Head Injury: Care Instructions. \"  Current as of: June 4, 2018  Content Version: 11.8  © 8530-0169 [employer]. Care instructions adapted under license by [employer] (which disclaims liability or warranty for this information). If you have questions about a medical condition or this instruction, always ask your healthcare professional. [address] any warranty or liability for your use of this information.

## 2018-12-20 NOTE — ED PROVIDER NOTES
The history is provided by the patient. Fall   Incident onset: 2 hours PTA. Fall occurred: Sitting in wheelchair, leaned forward and fell out, hitting right forehead and bilateral knees. She fell from a height of ground level. She landed on hard floor. There was no blood loss. The point of impact was the head. The pain is present in the head. The pain is at a severity of 8/10. She was ambulatory at the scene. There was no entrapment after the fall. There was no drug use involved in the accident. There was no alcohol use involved in the accident. Associated symptoms include headaches (Over hematoma) and hearing loss. Pertinent negatives include no visual change, no fever, no numbness, no abdominal pain, no bowel incontinence, no nausea, no vomiting, no hematuria, no extremity weakness, no loss of consciousness, no tingling and no laceration. The risk factors include being elderly and cardiac disease. The symptoms are aggravated by pressure on injury. She has tried ice for the symptoms. The treatment provided mild relief. Denies any other injuries. 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 Floyd Live Hyperparathyroidism due to renal insufficiency ([address]     Hypertension     Lupus     Stroke [facility])        Past Surgical History:   Procedure Laterality Date    HX  SECTION      HX VASCULAR ACCESS           No family history on file.     Social History     Socioeconomic History    Marital status:      Spouse name: Not on file    Number of children: Not on file    Years of education: Not on file    Highest education level: Not on file   Social Needs    Financial resource strain: Not on file    Food insecurity - worry: Not on file    Food insecurity - inability: Not on file   nextsocial needs - medical: Not on file   [employer] needs - non-medical: Not on file   Occupational History    Not on file   Tobacco Use    Smoking status: Never Smoker    Smokeless tobacco: Never Used   Substance and Sexual Activity    Alcohol use: No    Drug use: No    Sexual activity: Not on file   Other Topics Concern    Not on file   Social History Narrative    Tobacco use: None    Alcohol use: None    Drug use: None        Currently lives in [place] with her mother    Patient is , has 1 adult son        Currently on disability, used to be a CNA         ALLERGIES: Sulfa (sulfonamide antibiotics)    Review of Systems   Constitutional: Negative for chills and fever. HENT: Negative for ear pain, rhinorrhea and sore throat. Eyes: Negative for pain and redness. Respiratory: Negative for cough and shortness of breath. Cardiovascular: Negative for chest pain. Gastrointestinal: Negative for abdominal pain, bowel incontinence, constipation, diarrhea, nausea and vomiting. Genitourinary: Negative for dysuria and hematuria. Musculoskeletal: Negative for arthralgias, extremity weakness, neck pain and neck stiffness. Skin: Negative. Negative for wound. Neurological: Positive for headaches (Over hematoma). Negative for dizziness, tingling, tremors, seizures, loss of consciousness, syncope, facial asymmetry, speech difficulty, weakness, light-headedness and numbness. Psychiatric/Behavioral: Negative. All other systems reviewed and are negative. Vitals:    12/20/18 1444 12/20/18 1826 12/20/18 1826   BP: 154/73     Pulse: 80     Resp:  20    Temp: 97.8 °F (36.6 °C)     SpO2:  99% 99%            Physical Exam   Constitutional: She is oriented to person, place, and time. She appears well-developed and well-nourished. She is cooperative. Non-toxic appearance. No distress. HENT:   Head: Normocephalic and atraumatic. Head is without raccoon's eyes, without Gaspar's sign, without abrasion, without right periorbital erythema and without left periorbital erythema. Right Ear: Tympanic membrane, external ear and ear canal normal.   Left Ear: Tympanic membrane, external ear and ear canal normal.   Nose: Nose normal.   Mouth/Throat: Oropharynx is clear and moist and mucous membranes are normal.   Eyes: Conjunctivae and EOM are normal. Pupils are equal, round, and reactive to light. Neck: Normal range of motion. Neck supple. Cardiovascular: Normal rate, regular rhythm, normal heart sounds and intact distal pulses. Palpable thrill, left arm fisutla. Pulmonary/Chest: Effort normal and breath sounds normal.   Abdominal: Soft. Bowel sounds are normal. There is no tenderness. Musculoskeletal: Normal range of motion. Neurological: She is alert and oriented to person, place, and time. She is not disoriented. She displays no atrophy, no tremor and normal reflexes. No cranial nerve deficit or sensory deficit. She exhibits normal muscle tone. She displays no seizure activity. Coordination and gait normal. GCS eye subscore is 4. GCS verbal subscore is 5. GCS motor subscore is 6. Sensation intact, 5/5 strength in all extremities, 2+ reflexes in all extremities, stable gait, normal bilateral finger to nose exam.    Skin: Skin is warm and dry. Capillary refill takes less than 2 seconds. No laceration noted. Psychiatric: She has a normal mood and affect. Her behavior is normal. Judgment and thought content normal.   Nursing note and vitals reviewed. MDM  Number of Diagnoses or Management Options  Fall, initial encounter: new and requires workup  Traumatic hematoma of forehead, initial encounter: new and requires workup  Diagnosis management comments: DDX: contusion, hematoma, concussion, intracranial hemorrhage    CT head   IMPRESSION:   1. Right frontal subgaleal hematoma without skull fracture. 2. No evidence of acute fracture. Stable lacunar infarcts as noted above. D/w ED attending Dr. Werner Beckman, recommends consult with neuro. Consult:    6:38 PM   Discussed care with Dr. Abhijit Villavicencio, neuro. Standard discussion; including history of patients chief complaint, available diagnostic results, and treatment course. PLAN: Galeal hematoma is c/w scalp hematoma, no further workup needed, dc to home with ice and tylenol. PCP f/u. Strict ED return precautions. Pt results have been reviewed with the patient and any family present. They have been counseled regarding diagnosis, treatment, and plan. They verbally convey understanding and agreement of the signs, symptoms, diagnosis, treatment and prognosis and additionally agrees to follow up as discussed. They also agree with the care-plan and convey that all of their questions have been answered. I have also provided discharge instructions for them that include: educational information regarding their diagnosis and treatment, and list of reasons why they would want to return to the ED prior to their follow-up appointment, should their condition change. Gagan Damon PA-C             Amount and/or Complexity of Data Reviewed  Tests in the radiology section of CPT®: ordered and reviewed  Review and summarize past medical records: yes  Discuss the patient with other providers: yes    Risk of Complications, Morbidity, and/or Mortality  Presenting problems: low  Diagnostic procedures: low  Management options: low    Patient Progress  Patient progress: stable         Procedures    Diagnosis:   1. Fall, initial encounter    2. Traumatic hematoma of forehead, initial encounter          Disposition: Discharge to home.      Follow-up Information     Follow up With Specialties Details Why [address] [facility] EMERGENCY DEPT Emergency Medicine  As needed, If symptoms worsen Katja Downey [address] [facility]    Nathen Garrido MD Internal Medicine Go in 2 days  [address] [place] [address] Breonna Ji  [phone]               Medication List      ASK your doctor about these medications    acetaminophen 325 mg tablet  Commonly known as:  TYLENOL  Take 2 Tabs by mouth every four (4) hours as needed. amLODIPine 5 mg tablet  Commonly known as:  NORVASC     aspirin delayed-release 81 mg tablet  Take 1 Tab by mouth daily. calcium acetate 667 mg Cap  Commonly known as:  PHOSLO     diphenhydrAMINE 25 mg capsule  Commonly known as:  BENADRYL  Take 1 Cap by mouth every six (6) hours as needed for Itching for up to 10 days. ergocalciferol 50,000 unit capsule  Commonly known as:  ERGOCALCIFEROL  Take 1 Cap by mouth every seven (7) days. insulin glargine 100 unit/mL injection  Commonly known as:  LANTUS  5 units hs     * lidocaine 5 %  Commonly known as:  LIDODERM  Apply patch to the affected area for 12 hours a day and remove for 12 hours a day. * lidocaine 5 %  Commonly known as:  LIDODERM  Apply patch to the affected area DAILY X 15 DAYS     naproxen 250 mg tablet  Commonly known as:  NAPROSYN  Take 1 Tab by mouth two (2) times a day. Indications: OSTEOARTHRITIS     predniSONE 5 mg tablet  Commonly known as:  DELTASONE     psyllium husk-aspartame 3.4 gram Pwpk packet  Commonly known as:  METAMUCIL FIBER  Take 1 Packet by mouth daily. SENSIPAR 30 mg tablet  Generic drug:  cinacalcet         * This list has 2 medication(s) that are the same as other medications prescribed for you. Read the directions carefully, and ask your doctor or other care provider to review them with you.

## 2018-12-20 NOTE — ED TRIAGE NOTES
Pt. Saundra Guerrero in by medics, per medics pt. Rosalina Oscar and hit her head while in dialysis. No LOC, no N/V or dizziness. Observed large hematoma to forehead. Pt. A/o x 4.

## 2018-12-21 LAB
ATRIAL RATE: 79 BPM
CALCULATED P AXIS, ECG09: 57 DEGREES
CALCULATED R AXIS, ECG10: -36 DEGREES
CALCULATED T AXIS, ECG11: 72 DEGREES
DIAGNOSIS, 93000: NORMAL
P-R INTERVAL, ECG05: 130 MS
Q-T INTERVAL, ECG07: 394 MS
QRS DURATION, ECG06: 86 MS
QTC CALCULATION (BEZET), ECG08: 451 MS
VENTRICULAR RATE, ECG03: 79 BPM

## 2018-12-24 PROBLEM — I50.9 CHF (CONGESTIVE HEART FAILURE) (HCC): Status: ACTIVE | Noted: 2018-12-24

## 2018-12-24 PROBLEM — E87.70 VOLUME OVERLOAD: Status: ACTIVE | Noted: 2018-12-24

## 2018-12-24 PROBLEM — R06.03 RESPIRATORY DISTRESS: Status: ACTIVE | Noted: 2018-12-24

## 2018-12-24 PROBLEM — D72.829 LEUKOCYTOSIS: Status: ACTIVE | Noted: 2018-12-24

## 2018-12-27 PROBLEM — E87.70 VOLUME OVERLOAD: Status: RESOLVED | Noted: 2018-12-24 | Resolved: 2018-12-27

## 2018-12-27 PROBLEM — S09.90XA HEAD INJURY DUE TO TRAUMA: Status: RESOLVED | Noted: 2018-12-27 | Resolved: 2018-12-27

## 2018-12-27 PROBLEM — R06.03 RESPIRATORY DISTRESS: Status: RESOLVED | Noted: 2018-12-24 | Resolved: 2018-12-27

## 2018-12-27 PROBLEM — R09.02 HYPOXIA: Status: RESOLVED | Noted: 2018-04-11 | Resolved: 2018-12-27

## 2018-12-27 PROBLEM — S00.83XA CONTUSION OF FOREHEAD: Status: ACTIVE | Noted: 2018-12-27

## 2018-12-27 PROBLEM — S09.90XA HEAD INJURY DUE TO TRAUMA: Status: ACTIVE | Noted: 2018-12-27

## 2018-12-27 PROBLEM — H05.239 PERIORBITAL HEMATOMA: Status: RESOLVED | Noted: 2018-12-27 | Resolved: 2018-12-27

## 2018-12-27 PROBLEM — I50.9 CHF (CONGESTIVE HEART FAILURE) (HCC): Status: RESOLVED | Noted: 2018-12-24 | Resolved: 2018-12-27

## 2018-12-27 PROBLEM — D64.9 ANEMIA: Status: RESOLVED | Noted: 2018-12-04 | Resolved: 2018-12-27

## 2018-12-27 PROBLEM — H05.239 PERIORBITAL HEMATOMA: Status: ACTIVE | Noted: 2018-12-27

## 2019-01-21 PROBLEM — I50.9 CHF (CONGESTIVE HEART FAILURE) (HCC): Status: ACTIVE | Noted: 2019-01-21

## 2019-01-21 PROBLEM — A41.9 SEPSIS (HCC): Status: ACTIVE | Noted: 2019-01-21

## 2019-01-21 PROBLEM — J96.01 ACUTE HYPOXEMIC RESPIRATORY FAILURE (HCC): Status: ACTIVE | Noted: 2019-01-21

## 2019-01-21 PROBLEM — Z87.448 HISTORY OF END STAGE RENAL DISEASE: Status: ACTIVE | Noted: 2019-01-21

## 2019-01-21 PROBLEM — E87.70 VOLUME OVERLOAD: Status: ACTIVE | Noted: 2019-01-21

## 2019-01-22 PROBLEM — E87.6 HYPOKALEMIA: Status: ACTIVE | Noted: 2019-01-22

## 2019-01-24 PROBLEM — J96.01 ACUTE HYPOXEMIC RESPIRATORY FAILURE (HCC): Status: RESOLVED | Noted: 2019-01-21 | Resolved: 2019-01-24

## 2019-01-24 PROBLEM — E87.70 VOLUME OVERLOAD: Status: RESOLVED | Noted: 2019-01-21 | Resolved: 2019-01-24

## 2019-01-24 PROBLEM — I50.9 CHF (CONGESTIVE HEART FAILURE) (HCC): Status: RESOLVED | Noted: 2019-01-21 | Resolved: 2019-01-24

## 2019-01-24 PROBLEM — D72.829 LEUKOCYTOSIS: Status: RESOLVED | Noted: 2018-12-24 | Resolved: 2019-01-24

## 2019-05-21 PROBLEM — I50.9 CHF (CONGESTIVE HEART FAILURE) (HCC): Status: ACTIVE | Noted: 2019-05-21

## 2019-05-21 PROBLEM — R06.02 SHORTNESS OF BREATH: Status: ACTIVE | Noted: 2019-05-21

## 2019-05-21 PROBLEM — R07.9 ACUTE CHEST PAIN: Status: ACTIVE | Noted: 2019-05-21

## 2019-08-20 PROBLEM — I96 GANGRENOUS TOE (HCC): Status: ACTIVE | Noted: 2019-08-20

## 2019-08-20 PROBLEM — R73.9 HYPERGLYCEMIA: Status: ACTIVE | Noted: 2019-08-20

## 2019-08-20 PROBLEM — E11.622 DIABETIC ULCER OF ANKLE (HCC): Status: ACTIVE | Noted: 2019-08-20

## 2019-08-20 PROBLEM — I96 DRY GANGRENE (HCC): Status: ACTIVE | Noted: 2019-08-20

## 2019-08-20 PROBLEM — L97.309 DIABETIC ULCER OF ANKLE (HCC): Status: ACTIVE | Noted: 2019-08-20

## 2019-08-22 PROBLEM — E43 SEVERE PROTEIN-CALORIE MALNUTRITION (GOMEZ: LESS THAN 60% OF STANDARD WEIGHT) (HCC): Chronic | Status: ACTIVE | Noted: 2019-08-22

## 2019-09-02 PROBLEM — R73.9 HYPERGLYCEMIA: Status: RESOLVED | Noted: 2019-08-20 | Resolved: 2019-09-02

## 2019-09-02 PROBLEM — I96 DRY GANGRENE (HCC): Status: RESOLVED | Noted: 2019-08-20 | Resolved: 2019-09-02

## 2019-09-02 PROBLEM — I96 GANGRENOUS TOE (HCC): Status: RESOLVED | Noted: 2019-08-20 | Resolved: 2019-09-02

## 2019-09-13 PROBLEM — J81.1 PULMONARY EDEMA: Status: ACTIVE | Noted: 2019-09-13

## 2019-09-13 PROBLEM — D64.9 ANEMIA: Status: ACTIVE | Noted: 2019-09-13

## 2020-01-17 PROBLEM — N18.6 ESRD (END STAGE RENAL DISEASE) (HCC): Status: ACTIVE | Noted: 2020-01-17

## 2020-01-17 PROBLEM — N18.9 RENAL FAILURE (ARF), ACUTE ON CHRONIC (HCC): Status: RESOLVED | Noted: 2018-12-04 | Resolved: 2020-01-17

## 2020-01-17 PROBLEM — R06.00 DYSPNEA: Status: ACTIVE | Noted: 2020-01-17

## 2020-01-17 PROBLEM — N17.9 RENAL FAILURE (ARF), ACUTE ON CHRONIC (HCC): Status: RESOLVED | Noted: 2018-12-04 | Resolved: 2020-01-17

## 2020-01-17 PROBLEM — H35.053 RETINAL NEOVASCULARIZATION, UNSPECIFIED, BILATERAL: Status: ACTIVE | Noted: 2020-01-17

## 2020-01-17 PROBLEM — H25.813 COMBINED FORMS OF AGE-RELATED CATARACT, BILATERAL: Status: ACTIVE | Noted: 2020-01-17

## 2020-01-17 PROBLEM — L93.0 LUPUS ERYTHEMATOSUS: Status: ACTIVE | Noted: 2017-05-02

## 2020-01-17 PROBLEM — I63.9 CEREBRAL INFARCTION (HCC): Status: ACTIVE | Noted: 2020-01-17

## 2020-01-17 PROBLEM — E11.3291 TYPE 2 DIABETES MELLITUS WITH MILD NONPROLIFERATIVE DIABETIC RETINOPATHY WITHOUT MACULAR EDEMA, RIGHT EYE (HCC): Status: ACTIVE | Noted: 2020-01-17

## 2020-01-17 PROBLEM — H33.23 SEROUS RETINAL DETACHMENT, BILATERAL: Status: RESOLVED | Noted: 2020-01-17 | Resolved: 2020-01-17

## 2020-01-17 PROBLEM — J18.9 CAP (COMMUNITY ACQUIRED PNEUMONIA): Status: ACTIVE | Noted: 2020-01-17

## 2020-01-17 PROBLEM — H33.23 SEROUS RETINAL DETACHMENT, BILATERAL: Status: ACTIVE | Noted: 2020-01-17

## 2020-01-17 PROBLEM — H35.053 RETINAL NEOVASCULARIZATION, UNSPECIFIED, BILATERAL: Status: RESOLVED | Noted: 2020-01-17 | Resolved: 2020-01-17

## 2020-01-27 PROBLEM — J18.9 HCAP (HEALTHCARE-ASSOCIATED PNEUMONIA): Status: ACTIVE | Noted: 2020-01-27

## 2020-02-03 PROBLEM — J98.11 ATELECTASIS: Status: RESOLVED | Noted: 2020-02-03 | Resolved: 2020-02-03

## 2020-02-03 PROBLEM — J18.9 POSTOBSTRUCTIVE PNEUMONIA: Status: ACTIVE | Noted: 2020-02-03

## 2020-02-03 PROBLEM — T17.500A MUCUS PLUGGING OF BRONCHI: Status: ACTIVE | Noted: 2020-02-03

## 2020-02-03 PROBLEM — J98.11 ATELECTASIS: Status: ACTIVE | Noted: 2020-02-03

## 2020-02-03 PROBLEM — J18.9 HCAP (HEALTHCARE-ASSOCIATED PNEUMONIA): Status: RESOLVED | Noted: 2020-01-27 | Resolved: 2020-02-03

## 2020-02-03 PROBLEM — J18.9 CAP (COMMUNITY ACQUIRED PNEUMONIA): Status: RESOLVED | Noted: 2020-01-17 | Resolved: 2020-02-03

## 2020-02-03 PROBLEM — T17.500A MUCUS PLUGGING OF BRONCHI: Status: RESOLVED | Noted: 2020-02-03 | Resolved: 2020-02-03

## 2021-03-15 PROBLEM — D72.829 LEUKOCYTOSIS: Status: ACTIVE | Noted: 2021-03-15

## 2021-03-15 PROBLEM — R77.8 ELEVATED TROPONIN: Status: ACTIVE | Noted: 2021-03-15

## 2021-03-15 PROBLEM — M32.9 LUPUS (HCC): Status: ACTIVE | Noted: 2021-03-15

## 2021-03-20 PROBLEM — I25.119 ATHEROSCLEROSIS OF NATIVE CORONARY ARTERY OF NATIVE HEART WITH ANGINA PECTORIS (HCC): Status: ACTIVE | Noted: 2021-03-15

## 2021-03-20 PROBLEM — I73.9 PVD (PERIPHERAL VASCULAR DISEASE) (HCC): Status: ACTIVE | Noted: 2021-03-20

## 2021-03-20 PROBLEM — N18.6 ESRD ON HEMODIALYSIS (HCC): Chronic | Status: ACTIVE | Noted: 2017-05-02

## 2021-03-20 PROBLEM — Z99.2 ESRD ON HEMODIALYSIS (HCC): Chronic | Status: ACTIVE | Noted: 2017-05-02

## 2021-03-26 PROBLEM — R07.9 CHEST PAIN: Status: RESOLVED | Noted: 2018-05-12 | Resolved: 2021-03-26

## 2021-03-30 PROBLEM — K59.1 FUNCTIONAL DIARRHEA: Status: ACTIVE | Noted: 2021-03-30

## 2021-07-10 PROBLEM — D64.9 SYMPTOMATIC ANEMIA: Status: ACTIVE | Noted: 2021-07-10

## 2021-07-10 PROBLEM — R07.9 ACUTE CHEST PAIN: Status: ACTIVE | Noted: 2021-07-10

## 2021-07-10 PROBLEM — N18.6 END STAGE RENAL DISEASE ON DIALYSIS (HCC): Status: ACTIVE | Noted: 2021-07-10

## 2021-07-10 PROBLEM — Z99.2 END STAGE RENAL DISEASE ON DIALYSIS (HCC): Status: ACTIVE | Noted: 2021-07-10

## 2021-07-10 PROBLEM — R94.31 PROLONGED Q-T INTERVAL ON ECG: Status: ACTIVE | Noted: 2021-07-10

## 2021-07-10 PROBLEM — R77.8 ELEVATED TROPONIN: Status: ACTIVE | Noted: 2021-07-10

## 2021-07-11 PROBLEM — I25.119 CHEST PAIN DUE TO CORONARY ARTERY DISEASE (HCC): Status: ACTIVE | Noted: 2021-07-11

## 2021-07-11 PROBLEM — D62 ANEMIA ASSOCIATED WITH ACUTE BLOOD LOSS: Status: ACTIVE | Noted: 2021-07-11

## 2021-07-14 PROBLEM — R07.9 ACUTE CHEST PAIN: Status: RESOLVED | Noted: 2021-07-10 | Resolved: 2021-07-14

## 2021-07-14 PROBLEM — R94.31 PROLONGED Q-T INTERVAL ON ECG: Status: RESOLVED | Noted: 2021-07-10 | Resolved: 2021-07-14

## 2021-07-14 PROBLEM — I25.119 CHEST PAIN DUE TO CORONARY ARTERY DISEASE (HCC): Status: RESOLVED | Noted: 2021-07-11 | Resolved: 2021-07-14

## 2021-07-14 PROBLEM — D62 ANEMIA ASSOCIATED WITH ACUTE BLOOD LOSS: Status: RESOLVED | Noted: 2021-07-11 | Resolved: 2021-07-14

## 2021-07-14 PROBLEM — D64.9 SYMPTOMATIC ANEMIA: Status: RESOLVED | Noted: 2021-07-10 | Resolved: 2021-07-14

## 2022-05-09 PROBLEM — T68.XXXA HYPOTHERMIA: Status: ACTIVE | Noted: 2022-05-09
